# Patient Record
Sex: FEMALE | Race: BLACK OR AFRICAN AMERICAN | NOT HISPANIC OR LATINO
[De-identification: names, ages, dates, MRNs, and addresses within clinical notes are randomized per-mention and may not be internally consistent; named-entity substitution may affect disease eponyms.]

---

## 2017-01-19 ENCOUNTER — NON-APPOINTMENT (OUTPATIENT)
Age: 82
End: 2017-01-19

## 2017-01-19 ENCOUNTER — APPOINTMENT (OUTPATIENT)
Dept: CARDIOLOGY | Facility: CLINIC | Age: 82
End: 2017-01-19

## 2017-01-19 VITALS
OXYGEN SATURATION: 96 % | DIASTOLIC BLOOD PRESSURE: 68 MMHG | WEIGHT: 138 LBS | BODY MASS INDEX: 21.66 KG/M2 | HEIGHT: 67 IN | HEART RATE: 83 BPM | SYSTOLIC BLOOD PRESSURE: 117 MMHG

## 2017-04-19 ENCOUNTER — APPOINTMENT (OUTPATIENT)
Dept: UROGYNECOLOGY | Facility: CLINIC | Age: 82
End: 2017-04-19

## 2017-04-19 DIAGNOSIS — Z83.49 FAMILY HISTORY OF OTHER ENDOCRINE, NUTRITIONAL AND METABOLIC DISEASES: ICD-10-CM

## 2017-04-19 DIAGNOSIS — Z83.1 FAMILY HISTORY OF OTHER INFECTIOUS AND PARASITIC DISEASES: ICD-10-CM

## 2017-04-19 DIAGNOSIS — N39.41 URGE INCONTINENCE: ICD-10-CM

## 2017-04-19 LAB
BILIRUB UR QL STRIP: NORMAL
CLARITY UR: CLEAR
COLLECTION METHOD: NORMAL
GLUCOSE UR-MCNC: 500
HCG UR QL: 0.2 EU/DL
HGB UR QL STRIP.AUTO: NORMAL
KETONES UR-MCNC: NORMAL
LEUKOCYTE ESTERASE UR QL STRIP: NORMAL
NITRITE UR QL STRIP: NORMAL
PH UR STRIP: 5
PROT UR STRIP-MCNC: NORMAL
SP GR UR STRIP: 1.01

## 2017-05-09 ENCOUNTER — APPOINTMENT (OUTPATIENT)
Dept: UROGYNECOLOGY | Facility: CLINIC | Age: 82
End: 2017-05-09

## 2017-05-09 ENCOUNTER — OUTPATIENT (OUTPATIENT)
Dept: OUTPATIENT SERVICES | Facility: HOSPITAL | Age: 82
LOS: 1 days | End: 2017-05-09
Payer: COMMERCIAL

## 2017-05-09 DIAGNOSIS — Z01.818 ENCOUNTER FOR OTHER PREPROCEDURAL EXAMINATION: ICD-10-CM

## 2017-05-09 PROCEDURE — 51784 ANAL/URINARY MUSCLE STUDY: CPT

## 2017-05-09 PROCEDURE — 51797 INTRAABDOMINAL PRESSURE TEST: CPT

## 2017-05-09 PROCEDURE — 51729 CYSTOMETROGRAM W/VP&UP: CPT

## 2017-05-10 DIAGNOSIS — N39.0 URINARY TRACT INFECTION, SITE NOT SPECIFIED: ICD-10-CM

## 2017-05-10 DIAGNOSIS — R39.198 OTHER DIFFICULTIES WITH MICTURITION: ICD-10-CM

## 2017-05-10 DIAGNOSIS — N39.3 STRESS INCONTINENCE (FEMALE) (MALE): ICD-10-CM

## 2017-05-11 ENCOUNTER — NON-APPOINTMENT (OUTPATIENT)
Age: 82
End: 2017-05-11

## 2017-05-11 ENCOUNTER — APPOINTMENT (OUTPATIENT)
Dept: CARDIOLOGY | Facility: CLINIC | Age: 82
End: 2017-05-11

## 2017-05-11 VITALS
WEIGHT: 138 LBS | SYSTOLIC BLOOD PRESSURE: 147 MMHG | HEIGHT: 67 IN | OXYGEN SATURATION: 97 % | HEART RATE: 83 BPM | DIASTOLIC BLOOD PRESSURE: 66 MMHG | BODY MASS INDEX: 21.66 KG/M2

## 2017-05-16 ENCOUNTER — APPOINTMENT (OUTPATIENT)
Dept: UROGYNECOLOGY | Facility: CLINIC | Age: 82
End: 2017-05-16

## 2017-05-16 DIAGNOSIS — N39.3 STRESS INCONTINENCE (FEMALE) (MALE): ICD-10-CM

## 2017-06-15 ENCOUNTER — MEDICATION RENEWAL (OUTPATIENT)
Age: 82
End: 2017-06-15

## 2017-08-23 ENCOUNTER — RESULT REVIEW (OUTPATIENT)
Age: 82
End: 2017-08-23

## 2017-08-24 ENCOUNTER — APPOINTMENT (OUTPATIENT)
Dept: OBGYN | Facility: CLINIC | Age: 82
End: 2017-08-24
Payer: MEDICARE

## 2017-08-24 PROCEDURE — 99203 OFFICE O/P NEW LOW 30 MIN: CPT

## 2017-08-29 ENCOUNTER — APPOINTMENT (OUTPATIENT)
Dept: UROGYNECOLOGY | Facility: CLINIC | Age: 82
End: 2017-08-29
Payer: MEDICARE

## 2017-08-29 VITALS
SYSTOLIC BLOOD PRESSURE: 130 MMHG | WEIGHT: 143 LBS | DIASTOLIC BLOOD PRESSURE: 76 MMHG | HEIGHT: 65 IN | BODY MASS INDEX: 23.82 KG/M2

## 2017-08-29 PROCEDURE — 99213 OFFICE O/P EST LOW 20 MIN: CPT

## 2017-09-07 ENCOUNTER — APPOINTMENT (OUTPATIENT)
Dept: CARDIOLOGY | Facility: CLINIC | Age: 82
End: 2017-09-07
Payer: MEDICARE

## 2017-09-07 ENCOUNTER — NON-APPOINTMENT (OUTPATIENT)
Age: 82
End: 2017-09-07

## 2017-09-07 VITALS
SYSTOLIC BLOOD PRESSURE: 144 MMHG | BODY MASS INDEX: 22.99 KG/M2 | OXYGEN SATURATION: 96 % | WEIGHT: 138 LBS | HEIGHT: 65 IN | HEART RATE: 72 BPM | DIASTOLIC BLOOD PRESSURE: 72 MMHG

## 2017-09-07 PROCEDURE — 93000 ELECTROCARDIOGRAM COMPLETE: CPT

## 2017-09-07 PROCEDURE — 99214 OFFICE O/P EST MOD 30 MIN: CPT

## 2017-11-02 ENCOUNTER — APPOINTMENT (OUTPATIENT)
Dept: UROGYNECOLOGY | Facility: CLINIC | Age: 82
End: 2017-11-02
Payer: MEDICARE

## 2017-11-02 PROCEDURE — 51701 INSERT BLADDER CATHETER: CPT

## 2017-11-02 PROCEDURE — 99213 OFFICE O/P EST LOW 20 MIN: CPT | Mod: 25

## 2017-11-02 PROCEDURE — 81003 URINALYSIS AUTO W/O SCOPE: CPT | Mod: QW

## 2017-11-06 ENCOUNTER — RESULT REVIEW (OUTPATIENT)
Age: 82
End: 2017-11-06

## 2017-11-06 LAB — BACTERIA UR CULT: NORMAL

## 2017-11-27 ENCOUNTER — RESULT REVIEW (OUTPATIENT)
Age: 82
End: 2017-11-27

## 2017-11-27 LAB
APPEARANCE: CLEAR
BACTERIA: NEGATIVE
BILIRUBIN URINE: NEGATIVE
BLOOD URINE: NEGATIVE
COLOR: YELLOW
GLUCOSE QUALITATIVE U: 500 MG/DL
KETONES URINE: NEGATIVE
LEUKOCYTE ESTERASE URINE: NEGATIVE
MICROSCOPIC-UA: NORMAL
NITRITE URINE: NEGATIVE
PH URINE: 5.5
PROTEIN URINE: ABNORMAL MG/DL
RED BLOOD CELLS URINE: 3 /HPF
SPECIFIC GRAVITY URINE: 1.02
SQUAMOUS EPITHELIAL CELLS: 0 /HPF
UROBILINOGEN URINE: NEGATIVE MG/DL
WHITE BLOOD CELLS URINE: 1 /HPF

## 2017-12-07 ENCOUNTER — RESULT REVIEW (OUTPATIENT)
Age: 82
End: 2017-12-07

## 2017-12-07 ENCOUNTER — APPOINTMENT (OUTPATIENT)
Dept: UROGYNECOLOGY | Facility: CLINIC | Age: 82
End: 2017-12-07
Payer: MEDICARE

## 2017-12-07 LAB
BILIRUB UR QL STRIP: NORMAL
BILIRUB UR QL STRIP: NORMAL
CLARITY UR: CLEAR
CLARITY UR: CLEAR
COLLECTION METHOD: NORMAL
COLLECTION METHOD: NORMAL
GLUCOSE UR-MCNC: 500
GLUCOSE UR-MCNC: 500
HCG UR QL: 0.2 EU/DL
HCG UR QL: 0.2 EU/DL
HGB UR QL STRIP.AUTO: NORMAL
HGB UR QL STRIP.AUTO: NORMAL
KETONES UR-MCNC: NORMAL
KETONES UR-MCNC: NORMAL
LEUKOCYTE ESTERASE UR QL STRIP: NORMAL
LEUKOCYTE ESTERASE UR QL STRIP: NORMAL
NITRITE UR QL STRIP: NORMAL
NITRITE UR QL STRIP: NORMAL
PH UR STRIP: 5.5
PH UR STRIP: 5.5
PROT UR STRIP-MCNC: 30
PROT UR STRIP-MCNC: 30
SP GR UR STRIP: 1.01
SP GR UR STRIP: 1.01

## 2017-12-07 PROCEDURE — 81003 URINALYSIS AUTO W/O SCOPE: CPT | Mod: QW

## 2017-12-07 PROCEDURE — 99213 OFFICE O/P EST LOW 20 MIN: CPT | Mod: 25

## 2017-12-21 ENCOUNTER — APPOINTMENT (OUTPATIENT)
Dept: CT IMAGING | Facility: CLINIC | Age: 82
End: 2017-12-21
Payer: MEDICARE

## 2017-12-21 ENCOUNTER — OUTPATIENT (OUTPATIENT)
Dept: OUTPATIENT SERVICES | Facility: HOSPITAL | Age: 82
LOS: 1 days | End: 2017-12-21
Payer: MEDICARE

## 2017-12-21 DIAGNOSIS — D44.7 NEOPLASM OF UNCERTAIN BEHAVIOR OF AORTIC BODY AND OTHER PARAGANGLIA: ICD-10-CM

## 2017-12-21 PROCEDURE — 70480 CT ORBIT/EAR/FOSSA W/O DYE: CPT | Mod: 26

## 2017-12-21 PROCEDURE — 70480 CT ORBIT/EAR/FOSSA W/O DYE: CPT

## 2017-12-28 ENCOUNTER — RX RENEWAL (OUTPATIENT)
Age: 82
End: 2017-12-28

## 2018-01-08 ENCOUNTER — MESSAGE (OUTPATIENT)
Age: 83
End: 2018-01-08

## 2018-02-01 ENCOUNTER — APPOINTMENT (OUTPATIENT)
Dept: UROGYNECOLOGY | Facility: CLINIC | Age: 83
End: 2018-02-01

## 2018-03-15 ENCOUNTER — RX RENEWAL (OUTPATIENT)
Age: 83
End: 2018-03-15

## 2018-04-03 ENCOUNTER — NON-APPOINTMENT (OUTPATIENT)
Age: 83
End: 2018-04-03

## 2018-04-03 ENCOUNTER — APPOINTMENT (OUTPATIENT)
Dept: CARDIOLOGY | Facility: CLINIC | Age: 83
End: 2018-04-03
Payer: MEDICARE

## 2018-04-03 VITALS
OXYGEN SATURATION: 95 % | DIASTOLIC BLOOD PRESSURE: 71 MMHG | HEART RATE: 75 BPM | BODY MASS INDEX: 22.33 KG/M2 | HEIGHT: 65 IN | SYSTOLIC BLOOD PRESSURE: 135 MMHG | WEIGHT: 134 LBS

## 2018-04-03 PROCEDURE — 99214 OFFICE O/P EST MOD 30 MIN: CPT

## 2018-04-03 PROCEDURE — 93000 ELECTROCARDIOGRAM COMPLETE: CPT

## 2018-04-19 ENCOUNTER — OTHER (OUTPATIENT)
Age: 83
End: 2018-04-19

## 2018-05-25 ENCOUNTER — RX RENEWAL (OUTPATIENT)
Age: 83
End: 2018-05-25

## 2018-06-28 ENCOUNTER — APPOINTMENT (OUTPATIENT)
Dept: UROGYNECOLOGY | Facility: CLINIC | Age: 83
End: 2018-06-28
Payer: MEDICARE

## 2018-06-28 PROCEDURE — 99214 OFFICE O/P EST MOD 30 MIN: CPT

## 2018-06-28 RX ORDER — CLOTRIMAZOLE 10 MG/G
1 CREAM TOPICAL
Qty: 90 | Refills: 0 | Status: DISCONTINUED | COMMUNITY
Start: 2018-02-12

## 2018-06-28 RX ORDER — METFORMIN ER 500 MG 500 MG/1
500 TABLET ORAL
Qty: 180 | Refills: 0 | Status: DISCONTINUED | COMMUNITY
Start: 2018-04-23

## 2018-06-28 RX ORDER — REPAGLINIDE 0.5 MG/1
0.5 TABLET ORAL
Qty: 270 | Refills: 0 | Status: DISCONTINUED | COMMUNITY
Start: 2018-05-12

## 2018-07-05 ENCOUNTER — OUTPATIENT (OUTPATIENT)
Dept: OUTPATIENT SERVICES | Facility: HOSPITAL | Age: 83
LOS: 1 days | End: 2018-07-05
Payer: MEDICARE

## 2018-07-05 ENCOUNTER — APPOINTMENT (OUTPATIENT)
Dept: CT IMAGING | Facility: CLINIC | Age: 83
End: 2018-07-05
Payer: MEDICARE

## 2018-07-05 DIAGNOSIS — Z00.8 ENCOUNTER FOR OTHER GENERAL EXAMINATION: ICD-10-CM

## 2018-07-05 PROCEDURE — 70480 CT ORBIT/EAR/FOSSA W/O DYE: CPT

## 2018-07-05 PROCEDURE — 70480 CT ORBIT/EAR/FOSSA W/O DYE: CPT | Mod: 26

## 2018-07-10 ENCOUNTER — APPOINTMENT (OUTPATIENT)
Dept: CARDIOLOGY | Facility: CLINIC | Age: 83
End: 2018-07-10
Payer: MEDICARE

## 2018-07-10 ENCOUNTER — NON-APPOINTMENT (OUTPATIENT)
Age: 83
End: 2018-07-10

## 2018-07-10 VITALS
DIASTOLIC BLOOD PRESSURE: 70 MMHG | SYSTOLIC BLOOD PRESSURE: 120 MMHG | BODY MASS INDEX: 22.16 KG/M2 | OXYGEN SATURATION: 96 % | HEART RATE: 70 BPM | HEIGHT: 65 IN | WEIGHT: 133 LBS

## 2018-07-10 PROCEDURE — 99214 OFFICE O/P EST MOD 30 MIN: CPT

## 2018-07-10 PROCEDURE — 93000 ELECTROCARDIOGRAM COMPLETE: CPT

## 2018-07-11 ENCOUNTER — RESULT REVIEW (OUTPATIENT)
Age: 83
End: 2018-07-11

## 2018-07-11 LAB
ALBUMIN SERPL ELPH-MCNC: 4.3 G/DL
ALP BLD-CCNC: 59 U/L
ALT SERPL-CCNC: 12 U/L
ANION GAP SERPL CALC-SCNC: 14 MMOL/L
AST SERPL-CCNC: 20 U/L
BASOPHILS # BLD AUTO: 0.03 K/UL
BASOPHILS NFR BLD AUTO: 0.4 %
BILIRUB SERPL-MCNC: <0.2 MG/DL
BUN SERPL-MCNC: 18 MG/DL
CALCIUM SERPL-MCNC: 9.9 MG/DL
CHLORIDE SERPL-SCNC: 100 MMOL/L
CHOLEST SERPL-MCNC: 167 MG/DL
CHOLEST/HDLC SERPL: 2.2 RATIO
CO2 SERPL-SCNC: 27 MMOL/L
CREAT SERPL-MCNC: 1.1 MG/DL
EOSINOPHIL # BLD AUTO: 0.25 K/UL
EOSINOPHIL NFR BLD AUTO: 3.1 %
ESTIMATED AVERAGE GLUCOSE: 148 MG/DL
GLUCOSE SERPL-MCNC: 80 MG/DL
HBA1C MFR BLD HPLC: 6.8 %
HCT VFR BLD CALC: 34.3 %
HDLC SERPL-MCNC: 76 MG/DL
HGB BLD-MCNC: 10.9 G/DL
IMM GRANULOCYTES NFR BLD AUTO: 0.2 %
LDLC SERPL CALC-MCNC: 61 MG/DL
LYMPHOCYTES # BLD AUTO: 2.26 K/UL
LYMPHOCYTES NFR BLD AUTO: 28.1 %
MAN DIFF?: NORMAL
MCHC RBC-ENTMCNC: 29.5 PG
MCHC RBC-ENTMCNC: 31.8 GM/DL
MCV RBC AUTO: 93 FL
MONOCYTES # BLD AUTO: 0.54 K/UL
MONOCYTES NFR BLD AUTO: 6.7 %
NEUTROPHILS # BLD AUTO: 4.94 K/UL
NEUTROPHILS NFR BLD AUTO: 61.5 %
PLATELET # BLD AUTO: 281 K/UL
POTASSIUM SERPL-SCNC: 4.8 MMOL/L
PROT SERPL-MCNC: 7 G/DL
RBC # BLD: 3.69 M/UL
RBC # FLD: 14.1 %
SODIUM SERPL-SCNC: 141 MMOL/L
T4 SERPL-MCNC: 7.7 UG/DL
TRIGL SERPL-MCNC: 151 MG/DL
TSH SERPL-ACNC: 1.04 UIU/ML
WBC # FLD AUTO: 8.04 K/UL

## 2018-09-04 ENCOUNTER — RESULT REVIEW (OUTPATIENT)
Age: 83
End: 2018-09-04

## 2018-09-05 ENCOUNTER — APPOINTMENT (OUTPATIENT)
Dept: OBGYN | Facility: CLINIC | Age: 83
End: 2018-09-05
Payer: MEDICARE

## 2018-09-05 PROCEDURE — 99213 OFFICE O/P EST LOW 20 MIN: CPT

## 2018-10-02 ENCOUNTER — INPATIENT (INPATIENT)
Facility: HOSPITAL | Age: 83
LOS: 2 days | Discharge: HOME | DRG: 310 | End: 2018-10-05
Attending: INTERNAL MEDICINE | Admitting: INTERNAL MEDICINE
Payer: MEDICARE

## 2018-10-02 VITALS
SYSTOLIC BLOOD PRESSURE: 98 MMHG | WEIGHT: 138.89 LBS | RESPIRATION RATE: 17 BRPM | OXYGEN SATURATION: 96 % | HEART RATE: 66 BPM | HEIGHT: 67 IN | TEMPERATURE: 98 F | DIASTOLIC BLOOD PRESSURE: 63 MMHG

## 2018-10-02 DIAGNOSIS — R07.9 CHEST PAIN, UNSPECIFIED: ICD-10-CM

## 2018-10-02 LAB
ALBUMIN SERPL ELPH-MCNC: 4.3 G/DL — SIGNIFICANT CHANGE UP (ref 3.3–5)
ALP SERPL-CCNC: 52 U/L — SIGNIFICANT CHANGE UP (ref 40–120)
ALT FLD-CCNC: 11 U/L — SIGNIFICANT CHANGE UP (ref 10–45)
ANION GAP SERPL CALC-SCNC: 13 MMOL/L — SIGNIFICANT CHANGE UP (ref 5–17)
APTT BLD: 29.1 SEC — SIGNIFICANT CHANGE UP (ref 27.5–37.4)
AST SERPL-CCNC: 25 U/L — SIGNIFICANT CHANGE UP (ref 10–40)
BASOPHILS # BLD AUTO: 0.1 K/UL — SIGNIFICANT CHANGE UP (ref 0–0.2)
BASOPHILS NFR BLD AUTO: 0.9 % — SIGNIFICANT CHANGE UP (ref 0–2)
BILIRUB SERPL-MCNC: 0.2 MG/DL — SIGNIFICANT CHANGE UP (ref 0.2–1.2)
BUN SERPL-MCNC: 18 MG/DL — SIGNIFICANT CHANGE UP (ref 7–23)
CALCIUM SERPL-MCNC: 9.8 MG/DL — SIGNIFICANT CHANGE UP (ref 8.4–10.5)
CHLORIDE SERPL-SCNC: 104 MMOL/L — SIGNIFICANT CHANGE UP (ref 96–108)
CO2 SERPL-SCNC: 25 MMOL/L — SIGNIFICANT CHANGE UP (ref 22–31)
CREAT SERPL-MCNC: 1.14 MG/DL — SIGNIFICANT CHANGE UP (ref 0.5–1.3)
EOSINOPHIL # BLD AUTO: 0.1 K/UL — SIGNIFICANT CHANGE UP (ref 0–0.5)
EOSINOPHIL NFR BLD AUTO: 1.4 % — SIGNIFICANT CHANGE UP (ref 0–6)
GLUCOSE BLDC GLUCOMTR-MCNC: 93 MG/DL — SIGNIFICANT CHANGE UP (ref 70–99)
GLUCOSE SERPL-MCNC: 198 MG/DL — HIGH (ref 70–99)
HCT VFR BLD CALC: 35.7 % — SIGNIFICANT CHANGE UP (ref 34.5–45)
HGB BLD-MCNC: 12.1 G/DL — SIGNIFICANT CHANGE UP (ref 11.5–15.5)
INR BLD: 1.03 RATIO — SIGNIFICANT CHANGE UP (ref 0.88–1.16)
LYMPHOCYTES # BLD AUTO: 1.6 K/UL — SIGNIFICANT CHANGE UP (ref 1–3.3)
LYMPHOCYTES # BLD AUTO: 21.4 % — SIGNIFICANT CHANGE UP (ref 13–44)
MCHC RBC-ENTMCNC: 30.4 PG — SIGNIFICANT CHANGE UP (ref 27–34)
MCHC RBC-ENTMCNC: 33.8 GM/DL — SIGNIFICANT CHANGE UP (ref 32–36)
MCV RBC AUTO: 90.1 FL — SIGNIFICANT CHANGE UP (ref 80–100)
MONOCYTES # BLD AUTO: 0.6 K/UL — SIGNIFICANT CHANGE UP (ref 0–0.9)
MONOCYTES NFR BLD AUTO: 8 % — SIGNIFICANT CHANGE UP (ref 2–14)
NEUTROPHILS # BLD AUTO: 4.9 K/UL — SIGNIFICANT CHANGE UP (ref 1.8–7.4)
NEUTROPHILS NFR BLD AUTO: 68.3 % — SIGNIFICANT CHANGE UP (ref 43–77)
PLATELET # BLD AUTO: 296 K/UL — SIGNIFICANT CHANGE UP (ref 150–400)
POTASSIUM SERPL-MCNC: 4.6 MMOL/L — SIGNIFICANT CHANGE UP (ref 3.5–5.3)
POTASSIUM SERPL-SCNC: 4.6 MMOL/L — SIGNIFICANT CHANGE UP (ref 3.5–5.3)
PROT SERPL-MCNC: 7.3 G/DL — SIGNIFICANT CHANGE UP (ref 6–8.3)
PROTHROM AB SERPL-ACNC: 11.2 SEC — SIGNIFICANT CHANGE UP (ref 9.8–12.7)
RBC # BLD: 3.96 M/UL — SIGNIFICANT CHANGE UP (ref 3.8–5.2)
RBC # FLD: 12.4 % — SIGNIFICANT CHANGE UP (ref 10.3–14.5)
SODIUM SERPL-SCNC: 142 MMOL/L — SIGNIFICANT CHANGE UP (ref 135–145)
TROPONIN T, HIGH SENSITIVITY RESULT: 17 NG/L — SIGNIFICANT CHANGE UP (ref 0–51)
TROPONIN T, HIGH SENSITIVITY RESULT: 18 NG/L — SIGNIFICANT CHANGE UP (ref 0–51)
WBC # BLD: 7.2 K/UL — SIGNIFICANT CHANGE UP (ref 3.8–10.5)
WBC # FLD AUTO: 7.2 K/UL — SIGNIFICANT CHANGE UP (ref 3.8–10.5)

## 2018-10-02 PROCEDURE — 71046 X-RAY EXAM CHEST 2 VIEWS: CPT | Mod: 26

## 2018-10-02 PROCEDURE — 99223 1ST HOSP IP/OBS HIGH 75: CPT

## 2018-10-02 PROCEDURE — 99285 EMERGENCY DEPT VISIT HI MDM: CPT | Mod: 25

## 2018-10-02 PROCEDURE — 93010 ELECTROCARDIOGRAM REPORT: CPT

## 2018-10-02 RX ORDER — GLUCAGON INJECTION, SOLUTION 0.5 MG/.1ML
1 INJECTION, SOLUTION SUBCUTANEOUS ONCE
Qty: 0 | Refills: 0 | Status: DISCONTINUED | OUTPATIENT
Start: 2018-10-02 | End: 2018-10-05

## 2018-10-02 RX ORDER — ASPIRIN/CALCIUM CARB/MAGNESIUM 324 MG
324 TABLET ORAL ONCE
Qty: 0 | Refills: 0 | Status: COMPLETED | OUTPATIENT
Start: 2018-10-02 | End: 2018-10-02

## 2018-10-02 RX ORDER — DEXTROSE 50 % IN WATER 50 %
15 SYRINGE (ML) INTRAVENOUS ONCE
Qty: 0 | Refills: 0 | Status: DISCONTINUED | OUTPATIENT
Start: 2018-10-02 | End: 2018-10-05

## 2018-10-02 RX ORDER — METOPROLOL TARTRATE 50 MG
25 TABLET ORAL DAILY
Qty: 0 | Refills: 0 | Status: DISCONTINUED | OUTPATIENT
Start: 2018-10-02 | End: 2018-10-03

## 2018-10-02 RX ORDER — FAMOTIDINE 10 MG/ML
20 INJECTION INTRAVENOUS DAILY
Qty: 0 | Refills: 0 | Status: DISCONTINUED | OUTPATIENT
Start: 2018-10-02 | End: 2018-10-05

## 2018-10-02 RX ORDER — HEPARIN SODIUM 5000 [USP'U]/ML
5000 INJECTION INTRAVENOUS; SUBCUTANEOUS EVERY 8 HOURS
Qty: 0 | Refills: 0 | Status: DISCONTINUED | OUTPATIENT
Start: 2018-10-02 | End: 2018-10-05

## 2018-10-02 RX ORDER — REPAGLINIDE 1 MG/1
1 TABLET ORAL
Qty: 0 | Refills: 0 | COMMUNITY

## 2018-10-02 RX ORDER — INFLUENZA VIRUS VACCINE 15; 15; 15; 15 UG/.5ML; UG/.5ML; UG/.5ML; UG/.5ML
0.5 SUSPENSION INTRAMUSCULAR ONCE
Qty: 0 | Refills: 0 | Status: COMPLETED | OUTPATIENT
Start: 2018-10-02 | End: 2018-10-05

## 2018-10-02 RX ORDER — DONEPEZIL HYDROCHLORIDE 10 MG/1
1 TABLET, FILM COATED ORAL
Qty: 0 | Refills: 0 | COMMUNITY

## 2018-10-02 RX ORDER — METFORMIN HYDROCHLORIDE 850 MG/1
1000 TABLET ORAL
Qty: 0 | Refills: 0 | Status: DISCONTINUED | OUTPATIENT
Start: 2018-10-02 | End: 2018-10-03

## 2018-10-02 RX ORDER — SODIUM CHLORIDE 9 MG/ML
1000 INJECTION, SOLUTION INTRAVENOUS
Qty: 0 | Refills: 0 | Status: DISCONTINUED | OUTPATIENT
Start: 2018-10-02 | End: 2018-10-05

## 2018-10-02 RX ORDER — METFORMIN HYDROCHLORIDE 850 MG/1
1 TABLET ORAL
Qty: 0 | Refills: 0 | COMMUNITY

## 2018-10-02 RX ORDER — ROSUVASTATIN CALCIUM 5 MG/1
20 TABLET ORAL AT BEDTIME
Qty: 0 | Refills: 0 | Status: DISCONTINUED | OUTPATIENT
Start: 2018-10-02 | End: 2018-10-05

## 2018-10-02 RX ORDER — LOSARTAN POTASSIUM 100 MG/1
50 TABLET, FILM COATED ORAL DAILY
Qty: 0 | Refills: 0 | Status: DISCONTINUED | OUTPATIENT
Start: 2018-10-02 | End: 2018-10-05

## 2018-10-02 RX ORDER — ASPIRIN/CALCIUM CARB/MAGNESIUM 324 MG
81 TABLET ORAL DAILY
Qty: 0 | Refills: 0 | Status: DISCONTINUED | OUTPATIENT
Start: 2018-10-02 | End: 2018-10-05

## 2018-10-02 RX ORDER — DEXTROSE 50 % IN WATER 50 %
12.5 SYRINGE (ML) INTRAVENOUS ONCE
Qty: 0 | Refills: 0 | Status: DISCONTINUED | OUTPATIENT
Start: 2018-10-02 | End: 2018-10-05

## 2018-10-02 RX ORDER — DEXTROSE 50 % IN WATER 50 %
25 SYRINGE (ML) INTRAVENOUS ONCE
Qty: 0 | Refills: 0 | Status: DISCONTINUED | OUTPATIENT
Start: 2018-10-02 | End: 2018-10-05

## 2018-10-02 RX ORDER — DONEPEZIL HYDROCHLORIDE 10 MG/1
5 TABLET, FILM COATED ORAL AT BEDTIME
Qty: 0 | Refills: 0 | Status: DISCONTINUED | OUTPATIENT
Start: 2018-10-02 | End: 2018-10-05

## 2018-10-02 RX ADMIN — Medication 324 MILLIGRAM(S): at 13:54

## 2018-10-02 NOTE — H&P ADULT - NSHPPHYSICALEXAM_GEN_ALL_CORE
Physical exam with an elderly, chronically ill appearing F.  Afebrile.  HR  60  RR 14.  BP  132/76  98% on RA.  HEENT< PERRL< EOMI, neck supple, chest clear, cor s1 s2, refused breast exam.  Abdomen soft normal bowel sounds nontender, no rebound.  Ext with no edema (refused to remove socks).   Neurologic exam AXOx3.  Speech fluent.  Cognition intact.  Memory intact.  UE/LE 5/5.

## 2018-10-02 NOTE — H&P ADULT - PROBLEM SELECTOR PLAN 3
Presumed GERD, but concerning history of weight loss and on iron supplements>>would contact patient's GI in the AM on recent outpatient GI workup (i.e. EGD).

## 2018-10-02 NOTE — H&P ADULT - PROBLEM SELECTOR PLAN 2
See above.   Patient initially adamant against S/S but now agrees to FS and sliding scale>>will continue with metformin but would review with patient's endocrinologist in the AM patient's repaglinide.

## 2018-10-02 NOTE — H&P ADULT - HISTORY OF PRESENT ILLNESS
NIGHT HOSPITALIST:   Patient UNKNOWN to me previously, assigned to me at this point via the ER and by Dr. Appiah to admit this 82 y/o F--patient followed by her office physicians above--patient with a history of reported GERD and IBS, type 2 DM on metformin and repaglinide (followed by Dr. Eladio Velazquez), hyperlipidemia last admission to [place] in 2012, with the patient self referring to [facility] following an episode of epigastric pain with subjective dyspnea following an episode with a stressful event with a family meeting (patient refused to elaborate).  Patient with a similar episode one month ago.  Patient denies abdominal pain at present.  No dyspnea on exertion.  No chest pain/pressure.   No cough.  No fever, no chills, no rigors.   Patient with intermittently on oral iron (followed by GI Dr. Walsh above) with reported GERD, IBS but admits to a 4 kg weight loss for months that patient attributes to voluntary intake.  No dysuria, no hematuria.   Remaining review of systems not contributory.

## 2018-10-02 NOTE — ED PROVIDER NOTE - PMH
Diabetes mellitus type 2, controlled    HLD (hyperlipidemia)    HTN - Hypertension    IBS (irritable bowel syndrome)

## 2018-10-02 NOTE — H&P ADULT - ASSESSMENT
NIGHT HOSPITALIST:   Self referral of patient for symptoms of epigastric pain with subjective dyspnea this AM with a similar episode one month ago in this patient with type 2 DM with fluctuating random plasma glucose with reported GERD, IBS but with involuntary weight loss on oral iron supplementation (presumably for iron deficiency anemia)>>admitted to telemetry with nondiagnostic initial HS troponin.   Will still  assume cardiac equivalent and will obtain echo--would consider contacting Dr. Stephenson of patient's admission.    Patient is quite adamant and was upset about the possibility of routine FS with sliding scale coverage in the hospital>>patient does not check her own FS at home and unclear if symptoms are related to labile FS.  Patient apparently spoke with her RN and now agrees to the S/S regimen.  Will cautiously continue with metformin for now but would contact Dr. Eladio Velazquez on patient's admission and would review patient's repaglinide in the AM.  Will not provide sliding scale but will check FS to monitor for hypoglycemia.    Unclear if the patient's report of 4 kg weight loss is due to patient's glycemic control or whether this is from a GI source>>would contact patient's GI, Dr. Walsh, on a recent outpatient EGD workup.    Patient did NOT wish to discuss the circumstances with a family meeting that prompted her symptoms.  Emotional support provided to patient.

## 2018-10-02 NOTE — H&P ADULT - NSHPOUTPATIENTPROVIDERS_GEN_ALL_CORE
Yobani Stephenson MD  [phone]  Eladio Velazquez MD  (endocrinology)  [phone]  Jarod Walsh MD (GI) [phone]

## 2018-10-02 NOTE — H&P ADULT - NSHPSOURCEINFOTX_GEN_ALL_CORE
Reviewed medication list provided by patient. Reviewed medication list provided by patient.  Patient refused to have family contacted at present.

## 2018-10-02 NOTE — H&P ADULT - NSHPLABSRESULTS_GEN_ALL_CORE
WBC 7.2.  Hgb 12.1.  Platelets of 296K.  INR 1.0.  K+ 4.6.  Random glucose of 198>>repeat 93 without intervention.  Cr 1.1.  Alb 3.2.  chest radiograph reviewed with no infiltrate or effusion.  HS troponin nondiagnostic x 2.  EKG tracing reviewed with NSR at 77 with poor anterior R wave progression.

## 2018-10-02 NOTE — H&P ADULT - PROBLEM SELECTOR PLAN 1
See above.   Echo.   Would contact Dr. RUY Stephenson and Dr. Walsh (GI) of patient's admission.  Pepcid 20 mg daily for now.

## 2018-10-02 NOTE — ED PROVIDER NOTE - OBJECTIVE STATEMENT
83 F w HTN, DM, HLD, IBS reports experiencing dyspnea which began after a stressful event last night. She does not know how to describe the sensation in the chest, she reports that it is a feeling that makes her feel that she needs to lean forward and breath shallow which is intermittent since last night. She denies that the sensation is painful. She denies a cough, but is coughing during interview.  PCP/Cardiologist Dr Yobani Stephenson [phone]

## 2018-10-02 NOTE — ED ADULT NURSE NOTE - NSIMPLEMENTINTERV_GEN_ALL_ED
Implemented All Universal Safety Interventions:  [place] to call system. Call bell, personal items and telephone within reach. Instruct patient to call for assistance. Room bathroom lighting operational. Non-slip footwear when patient is off stretcher. Physically safe environment: no spills, clutter or unnecessary equipment. Stretcher in lowest position, wheels locked, appropriate side rails in place.

## 2018-10-02 NOTE — ED ADULT NURSE NOTE - OBJECTIVE STATEMENT
84 y/o with PMH HTN HDL DM presenting to ED for intermittent dull epigastric chest pain & SOB x 1 day. Pt states "I had a verbal altercation with my grandchildren last night and was upset. When I sat down I noticed that I was feeling short of breath and that there was this dull pain in my chest that feels like indigestion. I felt dizzy while it happened but then it went away." Upon exam, pt A&Ox3 gross neuro intact, lungs cta bilaterally, no difficulty speaking in complete sentences, s1s2 heart sounds heard, EKG done & given to MD, placed on cardiac monitor NSR 80s. Pt denies chest pain, sob, at this time, ha, n/v/d, abdominal pain, f/c, urinary symptoms, hematuria.

## 2018-10-02 NOTE — H&P ADULT - ATTENDING COMMENTS
NIGHT HOSPITALIST:  Patient aware of course and agrees with plan/care as above.  Care reviewed with covering NP.   Care assumed by Dr. Appiah.    Fracisco Gardner MD  [phone]

## 2018-10-02 NOTE — ED PROVIDER NOTE - MEDICAL DECISION MAKING DETAILS
Nemes - 84yo F w HTN, DM, HLD, IBS p/w chest pain and dyspnea after a stressful event last night. Repeated stx today, associated w dizziness. Asymptomatic now. Poor historian. VS, exam wnl, lungs/cardiac clear, no LE edema/tenderness. Low suspicion for dissection/PE, will get cardiac w/u, consider CDU vs admission

## 2018-10-03 DIAGNOSIS — R10.13 EPIGASTRIC PAIN: ICD-10-CM

## 2018-10-03 DIAGNOSIS — E11.65 TYPE 2 DIABETES MELLITUS WITH HYPERGLYCEMIA: ICD-10-CM

## 2018-10-03 DIAGNOSIS — K21.9 GASTRO-ESOPHAGEAL REFLUX DISEASE WITHOUT ESOPHAGITIS: ICD-10-CM

## 2018-10-03 LAB
ANION GAP SERPL CALC-SCNC: 11 MMOL/L — SIGNIFICANT CHANGE UP (ref 5–17)
APPEARANCE UR: ABNORMAL
BACTERIA # UR AUTO: ABNORMAL
BASOPHILS # BLD AUTO: 0.01 K/UL — SIGNIFICANT CHANGE UP (ref 0–0.2)
BASOPHILS NFR BLD AUTO: 0.2 % — SIGNIFICANT CHANGE UP (ref 0–2)
BILIRUB UR-MCNC: NEGATIVE — SIGNIFICANT CHANGE UP
BUN SERPL-MCNC: 18 MG/DL — SIGNIFICANT CHANGE UP (ref 7–23)
CALCIUM SERPL-MCNC: 9.7 MG/DL — SIGNIFICANT CHANGE UP (ref 8.4–10.5)
CHLORIDE SERPL-SCNC: 106 MMOL/L — SIGNIFICANT CHANGE UP (ref 96–108)
CO2 SERPL-SCNC: 27 MMOL/L — SIGNIFICANT CHANGE UP (ref 22–31)
COLOR SPEC: YELLOW — SIGNIFICANT CHANGE UP
CREAT ?TM UR-MCNC: 98 MG/DL — SIGNIFICANT CHANGE UP
CREAT SERPL-MCNC: 1.1 MG/DL — SIGNIFICANT CHANGE UP (ref 0.5–1.3)
DIFF PNL FLD: NEGATIVE — SIGNIFICANT CHANGE UP
EOSINOPHIL # BLD AUTO: 0.22 K/UL — SIGNIFICANT CHANGE UP (ref 0–0.5)
EOSINOPHIL NFR BLD AUTO: 3.4 % — SIGNIFICANT CHANGE UP (ref 0–6)
EPI CELLS # UR: 7 /HPF — HIGH
GLUCOSE BLDC GLUCOMTR-MCNC: 121 MG/DL — HIGH (ref 70–99)
GLUCOSE BLDC GLUCOMTR-MCNC: 123 MG/DL — HIGH (ref 70–99)
GLUCOSE BLDC GLUCOMTR-MCNC: 158 MG/DL — HIGH (ref 70–99)
GLUCOSE BLDC GLUCOMTR-MCNC: 171 MG/DL — HIGH (ref 70–99)
GLUCOSE SERPL-MCNC: 118 MG/DL — HIGH (ref 70–99)
GLUCOSE UR QL: NEGATIVE — SIGNIFICANT CHANGE UP
HBA1C BLD-MCNC: 7.3 % — HIGH (ref 4–5.6)
HCT VFR BLD CALC: 33.8 % — LOW (ref 34.5–45)
HGB BLD-MCNC: 11.2 G/DL — LOW (ref 11.5–15.5)
IMM GRANULOCYTES NFR BLD AUTO: 0 % — SIGNIFICANT CHANGE UP (ref 0–1.5)
KETONES UR-MCNC: NEGATIVE — SIGNIFICANT CHANGE UP
LEUKOCYTE ESTERASE UR-ACNC: NEGATIVE — SIGNIFICANT CHANGE UP
LYMPHOCYTES # BLD AUTO: 2.42 K/UL — SIGNIFICANT CHANGE UP (ref 1–3.3)
LYMPHOCYTES # BLD AUTO: 37.8 % — SIGNIFICANT CHANGE UP (ref 13–44)
MCHC RBC-ENTMCNC: 30.1 PG — SIGNIFICANT CHANGE UP (ref 27–34)
MCHC RBC-ENTMCNC: 33.1 GM/DL — SIGNIFICANT CHANGE UP (ref 32–36)
MCV RBC AUTO: 90.9 FL — SIGNIFICANT CHANGE UP (ref 80–100)
MICROALBUMIN UR-MCNC: 6 MG/DL — SIGNIFICANT CHANGE UP
MICROALBUMIN/CREAT UR-RTO: 61 MG/G — HIGH (ref 0–30)
MONOCYTES # BLD AUTO: 0.54 K/UL — SIGNIFICANT CHANGE UP (ref 0–0.9)
MONOCYTES NFR BLD AUTO: 8.4 % — SIGNIFICANT CHANGE UP (ref 2–14)
NEUTROPHILS # BLD AUTO: 3.22 K/UL — SIGNIFICANT CHANGE UP (ref 1.8–7.4)
NEUTROPHILS NFR BLD AUTO: 50.2 % — SIGNIFICANT CHANGE UP (ref 43–77)
NITRITE UR-MCNC: NEGATIVE — SIGNIFICANT CHANGE UP
PH UR: 6 — SIGNIFICANT CHANGE UP (ref 5–8)
PLATELET # BLD AUTO: 276 K/UL — SIGNIFICANT CHANGE UP (ref 150–400)
POTASSIUM SERPL-MCNC: 4.3 MMOL/L — SIGNIFICANT CHANGE UP (ref 3.5–5.3)
POTASSIUM SERPL-SCNC: 4.3 MMOL/L — SIGNIFICANT CHANGE UP (ref 3.5–5.3)
PROT UR-MCNC: ABNORMAL
RBC # BLD: 3.72 M/UL — LOW (ref 3.8–5.2)
RBC # FLD: 13.9 % — SIGNIFICANT CHANGE UP (ref 10.3–14.5)
RBC CASTS # UR COMP ASSIST: 4 /HPF — SIGNIFICANT CHANGE UP (ref 0–4)
SODIUM SERPL-SCNC: 144 MMOL/L — SIGNIFICANT CHANGE UP (ref 135–145)
SP GR SPEC: 1.02 — SIGNIFICANT CHANGE UP (ref 1.01–1.02)
UROBILINOGEN FLD QL: NEGATIVE — SIGNIFICANT CHANGE UP
WBC # BLD: 6.41 K/UL — SIGNIFICANT CHANGE UP (ref 3.8–10.5)
WBC # FLD AUTO: 6.41 K/UL — SIGNIFICANT CHANGE UP (ref 3.8–10.5)
WBC UR QL: 7 /HPF — HIGH (ref 0–5)

## 2018-10-03 PROCEDURE — 99222 1ST HOSP IP/OBS MODERATE 55: CPT

## 2018-10-03 PROCEDURE — 93306 TTE W/DOPPLER COMPLETE: CPT | Mod: 26

## 2018-10-03 PROCEDURE — 99223 1ST HOSP IP/OBS HIGH 75: CPT

## 2018-10-03 RX ORDER — METOPROLOL TARTRATE 50 MG
50 TABLET ORAL DAILY
Qty: 0 | Refills: 0 | Status: DISCONTINUED | OUTPATIENT
Start: 2018-10-03 | End: 2018-10-04

## 2018-10-03 RX ORDER — AMIODARONE HYDROCHLORIDE 400 MG/1
400 TABLET ORAL
Qty: 0 | Refills: 0 | Status: DISCONTINUED | OUTPATIENT
Start: 2018-10-03 | End: 2018-10-05

## 2018-10-03 RX ORDER — INSULIN LISPRO 100/ML
VIAL (ML) SUBCUTANEOUS AT BEDTIME
Qty: 0 | Refills: 0 | Status: DISCONTINUED | OUTPATIENT
Start: 2018-10-03 | End: 2018-10-05

## 2018-10-03 RX ORDER — INSULIN LISPRO 100/ML
VIAL (ML) SUBCUTANEOUS
Qty: 0 | Refills: 0 | Status: DISCONTINUED | OUTPATIENT
Start: 2018-10-03 | End: 2018-10-05

## 2018-10-03 RX ADMIN — Medication 1: at 12:36

## 2018-10-03 RX ADMIN — ROSUVASTATIN CALCIUM 20 MILLIGRAM(S): 5 TABLET ORAL at 22:35

## 2018-10-03 RX ADMIN — DONEPEZIL HYDROCHLORIDE 5 MILLIGRAM(S): 10 TABLET, FILM COATED ORAL at 22:29

## 2018-10-03 RX ADMIN — HEPARIN SODIUM 5000 UNIT(S): 5000 INJECTION INTRAVENOUS; SUBCUTANEOUS at 05:08

## 2018-10-03 RX ADMIN — Medication 1 TABLET(S): at 12:36

## 2018-10-03 RX ADMIN — AMIODARONE HYDROCHLORIDE 400 MILLIGRAM(S): 400 TABLET ORAL at 18:49

## 2018-10-03 RX ADMIN — Medication 25 MILLIGRAM(S): at 05:08

## 2018-10-03 RX ADMIN — Medication 50 MILLIGRAM(S): at 22:35

## 2018-10-03 RX ADMIN — FAMOTIDINE 20 MILLIGRAM(S): 10 INJECTION INTRAVENOUS at 12:36

## 2018-10-03 RX ADMIN — LOSARTAN POTASSIUM 50 MILLIGRAM(S): 100 TABLET, FILM COATED ORAL at 05:08

## 2018-10-03 RX ADMIN — HEPARIN SODIUM 5000 UNIT(S): 5000 INJECTION INTRAVENOUS; SUBCUTANEOUS at 12:36

## 2018-10-03 RX ADMIN — HEPARIN SODIUM 5000 UNIT(S): 5000 INJECTION INTRAVENOUS; SUBCUTANEOUS at 22:29

## 2018-10-03 RX ADMIN — Medication 81 MILLIGRAM(S): at 12:36

## 2018-10-03 NOTE — CONSULT NOTE ADULT - ASSESSMENT
#SVT:  83F hx HTN, HLD, DMII, GERD, IBS referred to [facility] ED from office for an episode of epigastric pain with subjective dyspnea following an episode with a stressful event with a family meeting (patient refused to elaborate).  Patient with a similar episode one month ago.  Patient denies abdominal pain at present.  No dyspnea on exertion.  No chest pain/pressure.   No cough.  No fever, no chills, no rigors.      #Assessment:  SVT (appears more likely ATach vs AV(N)RT) - difficult to find P-waves on telemetry    #Plan:  -Increase metoprolol as tolerated. #SVT:  83F hx HTN, HLD, DMII, GERD, IBS referred to [facility] ED from office for an episode of epigastric pain with subjective dyspnea following an episode with a stressful event with a family meeting (patient refused to elaborate).  Patient with a similar episode one month ago. Endorsing "indigestion" when tachycardic rhythm is sustained which may be concerning for anginal equivalent. Patient short of breath as well after episodes. Lastly, patient is also symptomatic from just APCs as well.    #Assessment:  High APC burden  Atrial tachycardia.    #Plan:  -Amiodarone 400 mg BID x 7 days, Amiodarone 200 mg BID x 7 days, Amiodarone 200 mg daily  -Check Thyroid and LFT panels

## 2018-10-03 NOTE — CONSULT NOTE ADULT - SUBJECTIVE AND OBJECTIVE BOX
Patient seen and evaluated at bedside    Chief Complaint:    HPI:  83F hx HTN, HLD, DMII, GERD, IBS referred to [facility] ED from office for an episode of epigastric pain with subjective dyspnea following an episode with a stressful event with a family meeting (patient refused to elaborate).  Patient with a similar episode one month ago.  Patient denies abdominal pain at present.  No dyspnea on exertion.  No chest pain/pressure.   No cough.  No fever, no chills, no rigors.      Consult called for runs of SVT into 150s    Patient endorsing that she has had palpitations for many years, usually a quick thump or skipped beats in her chest. Rarely, she does also get episodes where she can feel her heart beat very fast for an extended period of time (seconds). These episodes are associated with indigestion-like discomfort and are followed by shortness of breath at the offset. Also endorsed lightheadedness and dizziness, but denied LOC. Takes metoprolol succinate 25 mg daily. States that Dr. Stephenson came by and ordered them to increase her dose to 50 mg (which has been ordered)    Telemetry review: Patient primarily in sinus rhythm, with frequent PACs, notably has extended periods of a regular narrow complex tachycardia with sudden onset/offset. No EKGs in chart at time of events. Unclear if ATach vs AVNRT/AVRT      PMHx:   HLD (hyperlipidemia)  IBS (irritable bowel syndrome)  Diabetes mellitus type 2, controlled  HTN - Hypertension      PSHx:   H/O: hysterectomy      Allergies:  No Known Allergies      Home Meds:    Current Medications:   aspirin enteric coated 81 milliGRAM(s) Oral daily  dextrose 40% Gel 15 Gram(s) Oral once PRN  dextrose 5%. 1000 milliLiter(s) IV Continuous <Continuous>  dextrose 50% Injectable 12.5 Gram(s) IV Push once  dextrose 50% Injectable 25 Gram(s) IV Push once  dextrose 50% Injectable 25 Gram(s) IV Push once  donepezil 5 milliGRAM(s) Oral at bedtime  famotidine    Tablet 20 milliGRAM(s) Oral daily  glucagon  Injectable 1 milliGRAM(s) IntraMuscular once PRN  heparin  Injectable 5000 Unit(s) SubCutaneous every 8 hours  influenza   Vaccine 0.5 milliLiter(s) IntraMuscular once  insulin lispro (HumaLOG) corrective regimen sliding scale   SubCutaneous three times a day before meals  insulin lispro (HumaLOG) corrective regimen sliding scale   SubCutaneous at bedtime  losartan 50 milliGRAM(s) Oral daily  metoprolol succinate ER 50 milliGRAM(s) Oral daily  multivitamin 1 Tablet(s) Oral daily  rosuvastatin 20 milliGRAM(s) Oral at bedtime      FAMILY HISTORY:  No pertinent family history in first degree relatives      Social History:  Smoking History: Former 1/2ppd, quit  Alcohol Use: Denied  Drug Use: Denied    REVIEW OF SYSTEMS:  CONSTITUTIONAL: No weakness, fevers or chills  EYES/ENT: No visual changes;  No dysphagia  NECK: No pain or stiffness  RESPIRATORY: No cough, wheezing, hemoptysis; No shortness of breath  CARDIOVASCULAR: No chest pain; No lower extremity edema. Endorsed palpitations  GASTROINTESTINAL: No abdominal or epigastric pain. No nausea, vomiting, or hematemesis; No diarrhea or constipation. No melena or hematochezia.  BACK: No back pain  GENITOURINARY: No dysuria, frequency or hematuria  NEUROLOGICAL: No numbness or weakness  SKIN: No itching, burning, rashes, or lesions   All other review of systems is negative unless indicated above.    Physical Exam:  T(F): 98.8 (10-03), Max: 98.8 (10-03)  HR: 76 (10-03) (58 - 76)  BP: 101/59 (10-03) (101/59 - 156/73)  RR: 18 (10-03)  SpO2: 96% (10-03)  GENERAL: No acute distress, well-developed  HEAD:  Atraumatic, Normocephalic  ENT: EOMI, PERRLA, conjunctiva and sclera clear, Neck supple, No JVD, moist mucosa  CHEST/LUNG: Clear to auscultation bilaterally; No wheeze, equal breath sounds bilaterally   BACK: No spinal tenderness  HEART: Regular rate and rhythm; No murmurs, rubs, or gallops  ABDOMEN: Soft, Nontender, Nondistended; Bowel sounds present  EXTREMITIES:  No clubbing, cyanosis, or edema  PSYCH: Nl behavior, nl affect  NEUROLOGY: AAOx3, non-focal, cranial nerves intact  SKIN: Normal color, No rashes or lesions  LINES:    Cardiovascular Diagnostic Testing:    ECG: Personally reviewed:  Sinus rhythm with PACs    Echo: Personally reviewed:  Ordered    Labs: Personally reviewed                        11.2   6.41  )-----------( 276      ( 03 Oct 2018 07:43 )             33.8     10-03    144  |  106  |  18  ----------------------------<  118<H>  4.3   |  27  |  1.10    Ca    9.7      03 Oct 2018 06:31    TPro  7.3  /  Alb  4.3  /  TBili  0.2  /  DBili  x   /  AST  25  /  ALT  11  /  AlkPhos  52  10-02    PT/INR - ( 02 Oct 2018 14:12 )   PT: 11.2 sec;   INR: 1.03 ratio         PTT - ( 02 Oct 2018 14:12 )  PTT:29.1 sec    CARDIAC MARKERS ( 02 Oct 2018 15:30 )  17 ng/L / x     / x     / x     / x     / x      CARDIAC MARKERS ( 02 Oct 2018 14:12 )  18 ng/L / x     / x     / x     / x     / x          Hemoglobin A1C, Whole Blood: 7.3 % (10-03 @ 07:43)

## 2018-10-03 NOTE — CONSULT NOTE ADULT - ASSESSMENT
83 year-old with symptomatic PAT.  Appreciate EP consult - agree with amiodarone for 83 year-old with PAT despite beta-blocker.    Will monitor pulmonary function, thyroid function, and liver function tests as outpatient.    When arrhythmia seen to be better controlled, discharge planning per primary team.

## 2018-10-03 NOTE — CONSULT NOTE ADULT - SUBJECTIVE AND OBJECTIVE BOX
Patient seen and evaluated @ 9am on   Chief Complaint: Episodic dyspnea    HPI:  83 year-old woman with GERD and IBS, type 2 DM on metformin and repaglinide (followed by Dr. Eladio Velazquez), hyperlipidemia last admission to [place] in , with the patient self referring to [place] following an episode of epigastric pain with subjective dyspnea following an episode with a stressful event with a family meeting (patient refused to elaborate). Patient with a similar episode one month ago. Patient denies abdominal pain at present. No dyspnea on exertion. No chest pain/pressure. No cough. No fever, no chills, no rigors. Patient seen to have PAT on tele associated with symptoms.    PMH:   HLD (hyperlipidemia)  IBS (irritable bowel syndrome)  Diabetes mellitus type 2, controlled  HTN - Hypertension    PSH:   H/O: hysterectomy    Medications:   amiodarone    Tablet 400 milliGRAM(s) Oral two times a day  aspirin enteric coated 81 milliGRAM(s) Oral daily  dextrose 40% Gel 15 Gram(s) Oral once PRN  dextrose 5%. 1000 milliLiter(s) IV Continuous <Continuous>  dextrose 50% Injectable 12.5 Gram(s) IV Push once  dextrose 50% Injectable 25 Gram(s) IV Push once  dextrose 50% Injectable 25 Gram(s) IV Push once  donepezil 5 milliGRAM(s) Oral at bedtime  famotidine    Tablet 20 milliGRAM(s) Oral daily  glucagon  Injectable 1 milliGRAM(s) IntraMuscular once PRN  heparin  Injectable 5000 Unit(s) SubCutaneous every 8 hours  influenza   Vaccine 0.5 milliLiter(s) IntraMuscular once  insulin lispro (HumaLOG) corrective regimen sliding scale   SubCutaneous three times a day before meals  insulin lispro (HumaLOG) corrective regimen sliding scale   SubCutaneous at bedtime  losartan 50 milliGRAM(s) Oral daily  metoprolol succinate ER 50 milliGRAM(s) Oral daily  multivitamin 1 Tablet(s) Oral daily  rosuvastatin 20 milliGRAM(s) Oral at bedtime    Allergies:  No Known Allergies    FAMILY HISTORY:  No pertinent family history in first degree relatives    Social History: , lives alone  Smoking: former  Alcohol: no EtOH abuse    Review of Systems:  dyspnea with PAT on tele, otherwise  Constitutional: No fever, chills, fatigue, or changes in weight  HEENT: No blurry vision, eye pain, headache, runny nose, or sore throat  Respiratory: No shortness of breath, cough, or wheezing  Cardiovascular: No chest pain or palpitations  Gastrointestinal: No nausea, vomiting, diarrhea, or abdominal pain  Genitourinary: No dysuria or incontinence  Extremities: No lower extremity swelling  Neurologic: No focal findings  Lymphatic: No lymphadenopathy   Skin: No rash  Psychiatry: No anxiety or depression  10 point review of systems is otherwise negative except as mentioned above            Physical Exam:  T(C): 37.1 (10-03-18 @ 11:39), Max: 37.1 (10-03-18 @ 11:39)  HR: 76 (10-03-18 @ 11:39) (58 - 76)  BP: 101/59 (10-03-18 @ 11:39) (101/59 - 145/68)  RR: 18 (10-03-18 @ 11:39) (18 - 18)  SpO2: 96% (10-03-18 @ 11:39) (94% - 98%)  Wt(kg): --    10-02 @ 07:  -  10-03 @ 07:00  --------------------------------------------------------  IN: 480 mL / OUT: 300 mL / NET: 180 mL    10-03 @ 07:01  -  10-03 @ 19:02  --------------------------------------------------------  IN: 480 mL / OUT: 300 mL / NET: 180 mL      Daily     Daily Weight in k.9 (03 Oct 2018 08:55)    Appearance: Normal, well groomed, NAD  Eyes: PERRLA, EOMI, pink conjunctiva, no scleral icterus   HENT: Normal oral mucosa  Cardiovascular: RRR, S1, S2, no murmur, rub, or gallop; no edema; no JVD  Procedural Access Site: Clean, dry, intact, without hematoma  Respiratory: Clear to auscultation bilaterally  Gastrointestinal: Soft, non-tender, non-distended, BS+  Musculoskeletal: No clubbing or joint deformity   Neurologic: No focal weakness  Lymphatic: No lymphadenopathy  Psychiatry: AAOx3 with appropriate mood and affect  Skin: No rashes, ecchymoses, or cyanosis    Cardiovascular Diagnostic Testing:    ECG: sinus with PAC    Echo: < from: Transthoracic Echocardiogram (10.03.18 @ 14:11) >  ------------------------------------------------------------------------  Observations:  Mitral Valve: Thickened mitral valve. Mild mitral  regurgitation.  Aortic Valve/Aorta: Normal trileaflet aortic valve.  Aortic Root: 2.6 cm.  Left Atrium: Normal left atrium.  LA volume index = 22  cc/m2.  Left Ventricle: Endocardium not well visualized; grossly  normal left ventricular systolic function. Normal diastolic  function  Right Heart: Normal right atrium. The right ventricle is  not well visualized; grossly normal right ventricular  systolic function. Normal tricuspid valve. Minimal  tricuspid regurgitation. Normal pulmonic valve. No pulmonic  regurgitation.  Pericardium/Pleura: Normal pericardium with no pericardial  effusion.  Hemodynamic: Estimated right atrial pressure is 8 mm Hg.  ------------------------------------------------------------------------  Conclusions:  1. Endocardium not well visualized; grossly normal left  ventricular systolic function.  ------------------------------------------------------------------------  Confirmed on  10/3/2018 - 16:38:35 by Keven Moya M.D.  ------------------------------------------------------------------------    < end of copied text >    Interpretation of Telemetry: Sinus with PAT    Labs:                        11.2   6.41  )-----------( 276      ( 03 Oct 2018 07:43 )             33.8     10-03    144  |  106  |  18  ----------------------------<  118<H>  4.3   |  27  |  1.10    Ca    9.7      03 Oct 2018 06:31    TPro  7.3  /  Alb  4.3  /  TBili  0.2  /  DBili  x   /  AST  25  /  ALT  11  /  AlkPhos  52  10    PT/INR - ( 02 Oct 2018 14:12 )   PT: 11.2 sec;   INR: 1.03 ratio         PTT - ( 02 Oct 2018 14:12 )  PTT:29.1 sec          Hemoglobin A1C, Whole Blood: 7.3 % (10-03 @ 07:43)

## 2018-10-03 NOTE — PROGRESS NOTE ADULT - ASSESSMENT
84 yo female h/o htn, chol, dm, gerd, a/w atypical chest pain    noted to have PAT on tele  EP eval noted  echo pending  cont tele monitor  increase beta blocker as tolerated  check tsh  cards f/u    chol  cont statin    DM  on metformin  f/u a1c  iss    htn  cont current meds  bp acceptable    dvp ppx

## 2018-10-03 NOTE — PROGRESS NOTE ADULT - SUBJECTIVE AND OBJECTIVE BOX
CARMENCITA JENKINS  83y Female  MRN:[id number]    Patient is a 83y old  Female who presents with a chief complaint of Episode of epigastric discomfort with dyspnea (03 Oct 2018 13:56)    HPI:  NIGHT HOSPITALIST:   Patient UNKNOWN to me previously, assigned to me at this point via the ER and by Dr. Appiah to admit this 84 y/o F--patient followed by her office physicians above--patient with a history of reported GERD and IBS, type 2 DM on metformin and repaglinide (followed by Dr. Eladio Velazquez), hyperlipidemia last admission to [place] in 2012, with the patient self referring to [place] following an episode of epigastric pain with subjective dyspnea following an episode with a stressful event with a family meeting (patient refused to elaborate).  Patient with a similar episode one month ago.  Patient denies abdominal pain at present.  No dyspnea on exertion.  No chest pain/pressure.   No cough.  No fever, no chills, no rigors.   Patient with intermittently on oral iron (followed by GI Dr. Walsh above) with reported GERD, IBS but admits to a 4 kg weight loss for months that patient attributes to voluntary intake.  No dysuria, no hematuria.   Remaining review of systems not contributory. (02 Oct 2018 23:35)      Patient seen and evaluated at bedside. No acute events overnight except as noted.    Interval HPI: PAT on tele     PAST MEDICAL & SURGICAL HISTORY:  HLD (hyperlipidemia)  IBS (irritable bowel syndrome)  Diabetes mellitus type 2, controlled  HTN - Hypertension  H/O: hysterectomy: for cervical dysplasia      REVIEW OF SYSTEMS:  Constitutional: No fever, chills, fatigue or weight loss.  Skin: No rash.  Eyes: No recent vision problems or eye pain.  ENT: No congestion, ear pain, or sore throat.  Endocrine: No thyroid problems.  Cardiovascular: as per hpi  Respiratory: No cough, shortness of breath, congestion, or wheezing.  Gastrointestinal: No abdominal pain, nausea, vomiting, or diarrhea.  Genitourinary: No dysuria.  Musculoskeletal: No joint swelling.  Neurologic: No headache.    VITALS:  Vital Signs Last 24 Hrs  T(C): 37.1 (03 Oct 2018 11:39), Max: 37.1 (03 Oct 2018 11:39)  T(F): 98.8 (03 Oct 2018 11:39), Max: 98.8 (03 Oct 2018 11:39)  HR: 76 (03 Oct 2018 11:39) (58 - 76)  BP: 101/59 (03 Oct 2018 11:39) (101/59 - 156/73)  BP(mean): --  RR: 18 (03 Oct 2018 11:39) (18 - 18)  SpO2: 96% (03 Oct 2018 11:39) (94% - 98%)  CAPILLARY BLOOD GLUCOSE  123 (03 Oct 2018 07:46)      POCT Blood Glucose.: 171 mg/dL (03 Oct 2018 12:06)  POCT Blood Glucose.: 123 mg/dL (03 Oct 2018 07:46)  POCT Blood Glucose.: 93 mg/dL (02 Oct 2018 19:38)    I&O's Summary    02 Oct 2018 07:01  -  03 Oct 2018 07:00  --------------------------------------------------------  IN: 480 mL / OUT: 300 mL / NET: 180 mL    03 Oct 2018 07:01  -  03 Oct 2018 14:38  --------------------------------------------------------  IN: 480 mL / OUT: 300 mL / NET: 180 mL        PHYSICAL EXAM:  GENERAL: NAD, well-developed  HEAD:  Atraumatic, Normocephalic  EYES: EOMI, PERRLA, conjunctiva and sclera clear  NECK: Supple, No JVD  CHEST/LUNG: Clear to auscultation bilaterally; No wheeze  HEART: S1, S2; No murmurs, rubs, or gallops  ABDOMEN: Soft, Nontender, Nondistended; Bowel sounds present  EXTREMITIES:  2+ Peripheral Pulses, No clubbing, cyanosis, or edema  PSYCH: Normal affect  NEUROLOGY: AAOX3; non-focal  SKIN: No rashes or lesions    Consultant(s) Notes Reviewed:  [x ] YES  [ ] NO  Care Discussed with Consultants/Other Providers [ x] YES  [ ] NO    MEDS:  MEDICATIONS  (STANDING):  aspirin enteric coated 81 milliGRAM(s) Oral daily  dextrose 5%. 1000 milliLiter(s) (50 mL/Hr) IV Continuous <Continuous>  dextrose 50% Injectable 12.5 Gram(s) IV Push once  dextrose 50% Injectable 25 Gram(s) IV Push once  dextrose 50% Injectable 25 Gram(s) IV Push once  donepezil 5 milliGRAM(s) Oral at bedtime  famotidine    Tablet 20 milliGRAM(s) Oral daily  heparin  Injectable 5000 Unit(s) SubCutaneous every 8 hours  influenza   Vaccine 0.5 milliLiter(s) IntraMuscular once  insulin lispro (HumaLOG) corrective regimen sliding scale   SubCutaneous three times a day before meals  insulin lispro (HumaLOG) corrective regimen sliding scale   SubCutaneous at bedtime  losartan 50 milliGRAM(s) Oral daily  metoprolol succinate ER 50 milliGRAM(s) Oral daily  multivitamin 1 Tablet(s) Oral daily  rosuvastatin 20 milliGRAM(s) Oral at bedtime    MEDICATIONS  (PRN):  dextrose 40% Gel 15 Gram(s) Oral once PRN Blood Glucose LESS THAN 70 milliGRAM(s)/deciliter  glucagon  Injectable 1 milliGRAM(s) IntraMuscular once PRN Glucose LESS THAN 70 milligrams/deciliter    ALLERGIES:  No Known Allergies      LABS:                        11.2   6.41  )-----------( 276      ( 03 Oct 2018 07:43 )             33.8     10-03    144  |  106  |  18  ----------------------------<  118<H>  4.3   |  27  |  1.10    Ca    9.7      03 Oct 2018 06:31    TPro  7.3  /  Alb  4.3  /  TBili  0.2  /  DBili  x   /  AST  25  /  ALT  11  /  AlkPhos  52  10-02    PT/INR - ( 02 Oct 2018 14:12 )   PT: 11.2 sec;   INR: 1.03 ratio         PTT - ( 02 Oct 2018 14:12 )  PTT:29.1 sec      LIVER FUNCTIONS - ( 02 Oct 2018 14:12 )  Alb: 4.3 g/dL / Pro: 7.3 g/dL / ALK PHOS: 52 U/L / ALT: 11 U/L / AST: 25 U/L / GGT: x           Urinalysis Basic - ( 03 Oct 2018 02:00 )    Color: x / Appearance: x / SG: x / pH: x  Gluc: x / Ketone: x  / Bili: x / Urobili: x   Blood: x / Protein: x / Nitrite: x   Leuk Esterase: x / RBC: 4 /hpf / WBC 7 /hpf   Sq Epi: x / Non Sq Epi: 7 /hpf / Bacteria: Many      TSH:   A1c:Hemoglobin A1C, Whole Blood: 7.3 % (10-03 @ 07:43)    BNP:  Lipid panel:   cultures:     RADIOLOGY & ADDITIONAL TESTS:    Imaging Personally Reviewed:  [ ] YES  [ ] NO

## 2018-10-03 NOTE — PROVIDER CONTACT NOTE (OTHER) - ACTION/TREATMENT ORDERED:
Provider aware, no new orders at this time. Will continue to monitor.
provider made aware. no further action at this time. will continue to monitor. pt safety maintained.

## 2018-10-04 LAB
GLUCOSE BLDC GLUCOMTR-MCNC: 141 MG/DL — HIGH (ref 70–99)
GLUCOSE BLDC GLUCOMTR-MCNC: 174 MG/DL — HIGH (ref 70–99)
GLUCOSE BLDC GLUCOMTR-MCNC: 204 MG/DL — HIGH (ref 70–99)
GLUCOSE BLDC GLUCOMTR-MCNC: 217 MG/DL — HIGH (ref 70–99)
TSH SERPL-MCNC: 1.6 UIU/ML — SIGNIFICANT CHANGE UP (ref 0.27–4.2)

## 2018-10-04 PROCEDURE — 99232 SBSQ HOSP IP/OBS MODERATE 35: CPT

## 2018-10-04 PROCEDURE — 99233 SBSQ HOSP IP/OBS HIGH 50: CPT

## 2018-10-04 RX ORDER — METOPROLOL TARTRATE 50 MG
25 TABLET ORAL DAILY
Qty: 0 | Refills: 0 | Status: DISCONTINUED | OUTPATIENT
Start: 2018-10-04 | End: 2018-10-05

## 2018-10-04 RX ADMIN — HEPARIN SODIUM 5000 UNIT(S): 5000 INJECTION INTRAVENOUS; SUBCUTANEOUS at 13:24

## 2018-10-04 RX ADMIN — HEPARIN SODIUM 5000 UNIT(S): 5000 INJECTION INTRAVENOUS; SUBCUTANEOUS at 06:26

## 2018-10-04 RX ADMIN — AMIODARONE HYDROCHLORIDE 400 MILLIGRAM(S): 400 TABLET ORAL at 18:57

## 2018-10-04 RX ADMIN — LOSARTAN POTASSIUM 50 MILLIGRAM(S): 100 TABLET, FILM COATED ORAL at 06:26

## 2018-10-04 RX ADMIN — Medication 1 TABLET(S): at 12:17

## 2018-10-04 RX ADMIN — DONEPEZIL HYDROCHLORIDE 5 MILLIGRAM(S): 10 TABLET, FILM COATED ORAL at 21:31

## 2018-10-04 RX ADMIN — FAMOTIDINE 20 MILLIGRAM(S): 10 INJECTION INTRAVENOUS at 12:17

## 2018-10-04 RX ADMIN — ROSUVASTATIN CALCIUM 20 MILLIGRAM(S): 5 TABLET ORAL at 21:31

## 2018-10-04 RX ADMIN — Medication 1: at 08:19

## 2018-10-04 RX ADMIN — Medication 25 MILLIGRAM(S): at 12:17

## 2018-10-04 RX ADMIN — HEPARIN SODIUM 5000 UNIT(S): 5000 INJECTION INTRAVENOUS; SUBCUTANEOUS at 21:31

## 2018-10-04 RX ADMIN — AMIODARONE HYDROCHLORIDE 400 MILLIGRAM(S): 400 TABLET ORAL at 06:29

## 2018-10-04 RX ADMIN — Medication 81 MILLIGRAM(S): at 12:17

## 2018-10-04 RX ADMIN — Medication 2: at 12:17

## 2018-10-04 NOTE — PROGRESS NOTE ADULT - ASSESSMENT
#SVT:  83F hx HTN, HLD, DMII, GERD, IBS referred to [facility] ED from office for an episode of epigastric pain with subjective dyspnea following an episode with a stressful event with a family meeting (patient refused to elaborate).  Patient with a similar episode one month ago. Endorsing "indigestion" when tachycardic rhythm is sustained which may be concerning for anginal equivalent. Patient short of breath as well after episodes. Lastly, patient is also symptomatic from just APCs as well.    -Rates and PAC burden much improved.    #Assessment:  High APC burden  Atrial tachycardia.    #Plan:  -Amiodarone 400 mg BID x 7 days, Amiodarone 200 mg BID x 7 days, Amiodarone 200 mg daily  -Check Thyroid and LFT panels  -EP to sign off

## 2018-10-04 NOTE — PROGRESS NOTE ADULT - SUBJECTIVE AND OBJECTIVE BOX
HPI:  No further PAT since starting amiodarone.  Sinus bradycardia overnight noted.    Review Of Systems:           Respiratory: No shortness of breath, cough, or wheezing  Cardiovascular: No chest pain or palpitations  10 point review of systems is otherwise negative except as mentioned above        Medications:  amiodarone    Tablet 400 milliGRAM(s) Oral two times a day  aspirin enteric coated 81 milliGRAM(s) Oral daily  dextrose 40% Gel 15 Gram(s) Oral once PRN  dextrose 5%. 1000 milliLiter(s) IV Continuous <Continuous>  dextrose 50% Injectable 12.5 Gram(s) IV Push once  dextrose 50% Injectable 25 Gram(s) IV Push once  dextrose 50% Injectable 25 Gram(s) IV Push once  donepezil 5 milliGRAM(s) Oral at bedtime  famotidine    Tablet 20 milliGRAM(s) Oral daily  glucagon  Injectable 1 milliGRAM(s) IntraMuscular once PRN  heparin  Injectable 5000 Unit(s) SubCutaneous every 8 hours  influenza   Vaccine 0.5 milliLiter(s) IntraMuscular once  insulin lispro (HumaLOG) corrective regimen sliding scale   SubCutaneous three times a day before meals  insulin lispro (HumaLOG) corrective regimen sliding scale   SubCutaneous at bedtime  losartan 50 milliGRAM(s) Oral daily  metoprolol succinate ER 50 milliGRAM(s) Oral daily  multivitamin 1 Tablet(s) Oral daily  rosuvastatin 20 milliGRAM(s) Oral at bedtime    PAST MEDICAL & SURGICAL HISTORY:  HLD (hyperlipidemia)  IBS (irritable bowel syndrome)  Diabetes mellitus type 2, controlled  HTN - Hypertension  H/O: hysterectomy: for cervical dysplasia    Vitals:  T(C): 36.6 (10-04-18 @ 04:33), Max: 37.2 (10-03-18 @ 20:07)  HR: 55 (10-04-18 @ 06:25) (55 - 76)  BP: 142/61 (10-04-18 @ 06:25) (101/59 - 142/61)  BP(mean): --  RR: 18 (10-04-18 @ 04:33) (18 - 18)  SpO2: 97% (10-04-18 @ 04:33) (96% - 97%)  Wt(kg): --  Daily     Daily Weight in k (04 Oct 2018 08:38)  I&O's Summary    03 Oct 2018 07:01  -  04 Oct 2018 07:00  --------------------------------------------------------  IN: 720 mL / OUT: 300 mL / NET: 420 mL    04 Oct 2018 07:01  -  04 Oct 2018 09:45  --------------------------------------------------------  IN: 400 mL / OUT: 0 mL / NET: 400 mL        Physical Exam:  Appearance: No acute distress; well appearing  Eyes: PERRL, EOMI, pink conjunctiva  HENT: Normal oral mucosa  Cardiovascular: RRR, S1, S2, no murmurs, rubs, or gallops; no edema; no JVD  Respiratory: Clear to auscultation bilaterally  Gastrointestinal: soft, non-tender, non-distended with normal bowel sounds  Musculoskeletal: No clubbing; no joint deformity   Neurologic: Non-focal  Lymphatic: No lymphadenopathy  Psychiatry: AAOx3, mood & affect appropriate  Skin: No rashes, ecchymoses, or cyanosis                          11.2   6.41  )-----------( 276      ( 03 Oct 2018 07:43 )             33.8     10-03    144  |  106  |  18  ----------------------------<  118<H>  4.3   |  27  |  1.10    Ca    9.7      03 Oct 2018 06:31    TPro  7.3  /  Alb  4.3  /  TBili  0.2  /  DBili  x   /  AST  25  /  ALT  11  /  AlkPhos  52  10-02    PT/INR - ( 02 Oct 2018 14:12 )   PT: 11.2 sec;   INR: 1.03 ratio         PTT - ( 02 Oct 2018 14:12 )  PTT:29.1 sec          ECG: sinus with PAC    Echo: < from: Transthoracic Echocardiogram (10.03.18 @ 14:11) >  ------------------------------------------------------------------------  Observations:  Mitral Valve: Thickened mitral valve. Mild mitral  regurgitation.  Aortic Valve/Aorta: Normal trileaflet aortic valve.  Aortic Root: 2.6 cm.  Left Atrium: Normal left atrium.  LA volume index = 22  cc/m2.  Left Ventricle: Endocardium not well visualized; grossly  normal left ventricular systolic function. Normal diastolic  function  Right Heart: Normal right atrium. The right ventricle is  not well visualized; grossly normal right ventricular  systolic function. Normal tricuspid valve. Minimal  tricuspid regurgitation. Normal pulmonic valve. No pulmonic  regurgitation.  Pericardium/Pleura: Normal pericardium with no pericardial  effusion.  Hemodynamic: Estimated right atrial pressure is 8 mm Hg.  ------------------------------------------------------------------------  Conclusions:  1. Endocardium not well visualized; grossly normal left  ventricular systolic function.  ------------------------------------------------------------------------  Confirmed on  10/3/2018 - 16:38:35 by Keven Moya M.D.  ------------------------------------------------------------------------    < end of copied text >    Interpretation of Telemetry: Sinus without further PAT

## 2018-10-04 NOTE — PROGRESS NOTE ADULT - SUBJECTIVE AND OBJECTIVE BOX
CARMENCITA JENKINS  83y Female  MRN:[id number]    Patient is a 83y old  Female who presents with a chief complaint of Episode of epigastric discomfort with dyspnea (03 Oct 2018 13:56)    HPI:  NIGHT HOSPITALIST:   Patient UNKNOWN to me previously, assigned to me at this point via the ER and by Dr. Appiah to admit this 82 y/o F--patient followed by her office physicians above--patient with a history of reported GERD and IBS, type 2 DM on metformin and repaglinide (followed by Dr. Eladio Velazquez), hyperlipidemia last admission to [place] in 2012, with the patient self referring to [place] following an episode of epigastric pain with subjective dyspnea following an episode with a stressful event with a family meeting (patient refused to elaborate).  Patient with a similar episode one month ago.  Patient denies abdominal pain at present.  No dyspnea on exertion.  No chest pain/pressure.   No cough.  No fever, no chills, no rigors.   Patient with intermittently on oral iron (followed by GI Dr. Walsh above) with reported GERD, IBS but admits to a 4 kg weight loss for months that patient attributes to voluntary intake.  No dysuria, no hematuria.   Remaining review of systems not contributory. (02 Oct 2018 23:35)      Patient seen and evaluated at bedside. No acute events overnight except as noted.    Interval HPI: sinus on tele    PAST MEDICAL & SURGICAL HISTORY:  HLD (hyperlipidemia)  IBS (irritable bowel syndrome)  Diabetes mellitus type 2, controlled  HTN - Hypertension  H/O: hysterectomy: for cervical dysplasia         VITALS:  Vital Signs Last 24 Hrs  T(C): 36.9 (04 Oct 2018 11:42), Max: 37.2 (03 Oct 2018 20:07)  T(F): 98.5 (04 Oct 2018 11:42), Max: 99 (03 Oct 2018 20:07)  HR: 60 (04 Oct 2018 11:42) (55 - 65)  BP: 103/56 (04 Oct 2018 11:42) (103/56 - 142/61)  BP(mean): --  RR: 18 (04 Oct 2018 11:42) (18 - 18)  SpO2: 97% (04 Oct 2018 11:42) (96% - 97%)      PHYSICAL EXAM:  GENERAL: NAD, well-developed  HEAD:  Atraumatic, Normocephalic  EYES: EOMI, PERRLA, conjunctiva and sclera clear  NECK: Supple, No JVD  CHEST/LUNG: Clear to auscultation bilaterally; No wheeze  HEART: S1, S2; No murmurs, rubs, or gallops  ABDOMEN: Soft, Nontender, Nondistended; Bowel sounds present  EXTREMITIES:  2+ Peripheral Pulses, No clubbing, cyanosis, or edema  PSYCH: Normal affect  NEUROLOGY: AAOX3; non-focal  SKIN: No rashes or lesions    Consultant(s) Notes Reviewed:  [x ] YES  [ ] NO  Care Discussed with Consultants/Other Providers [ x] YES  [ ] NO    MEDS:  MEDICATIONS  (STANDING):  amiodarone    Tablet 400 milliGRAM(s) Oral two times a day  aspirin enteric coated 81 milliGRAM(s) Oral daily  dextrose 5%. 1000 milliLiter(s) (50 mL/Hr) IV Continuous <Continuous>  dextrose 50% Injectable 12.5 Gram(s) IV Push once  dextrose 50% Injectable 25 Gram(s) IV Push once  dextrose 50% Injectable 25 Gram(s) IV Push once  donepezil 5 milliGRAM(s) Oral at bedtime  famotidine    Tablet 20 milliGRAM(s) Oral daily  heparin  Injectable 5000 Unit(s) SubCutaneous every 8 hours  influenza   Vaccine 0.5 milliLiter(s) IntraMuscular once  insulin lispro (HumaLOG) corrective regimen sliding scale   SubCutaneous three times a day before meals  insulin lispro (HumaLOG) corrective regimen sliding scale   SubCutaneous at bedtime  losartan 50 milliGRAM(s) Oral daily  metoprolol succinate ER 25 milliGRAM(s) Oral daily  multivitamin 1 Tablet(s) Oral daily  rosuvastatin 20 milliGRAM(s) Oral at bedtime    MEDICATIONS  (PRN):  dextrose 40% Gel 15 Gram(s) Oral once PRN Blood Glucose LESS THAN 70 milliGRAM(s)/deciliter  glucagon  Injectable 1 milliGRAM(s) IntraMuscular once PRN Glucose LESS THAN 70 milligrams/deciliter      ALLERGIES:  No Known Allergies      LABS:                                       11.2   6.41  )-----------( 276      ( 03 Oct 2018 07:43 )             33.8   10-03    144  |  106  |  18  ----------------------------<  118<H>  4.3   |  27  |  1.10    Ca    9.7      03 Oct 2018 06:31    < from: Transthoracic Echocardiogram (10.03.18 @ 14:11) >  ----------------------------------------  Conclusions:  1. Endocardium not well visualized; grossly normal left  ventricular systolic function.  ----------------------------------    < end of copied text >

## 2018-10-04 NOTE — PROGRESS NOTE ADULT - SUBJECTIVE AND OBJECTIVE BOX
Patient seen and examined at bedside.    Overnight Events:   Telemetry with minimal PACs, no sustained AT. Rates as low as 45 during sleep    Review Of Systems: No chest pain, shortness of breath, or palpitations            Current Meds:  amiodarone    Tablet 400 milliGRAM(s) Oral two times a day  aspirin enteric coated 81 milliGRAM(s) Oral daily  dextrose 40% Gel 15 Gram(s) Oral once PRN  dextrose 5%. 1000 milliLiter(s) IV Continuous <Continuous>  dextrose 50% Injectable 12.5 Gram(s) IV Push once  dextrose 50% Injectable 25 Gram(s) IV Push once  dextrose 50% Injectable 25 Gram(s) IV Push once  donepezil 5 milliGRAM(s) Oral at bedtime  famotidine    Tablet 20 milliGRAM(s) Oral daily  glucagon  Injectable 1 milliGRAM(s) IntraMuscular once PRN  heparin  Injectable 5000 Unit(s) SubCutaneous every 8 hours  influenza   Vaccine 0.5 milliLiter(s) IntraMuscular once  insulin lispro (HumaLOG) corrective regimen sliding scale   SubCutaneous three times a day before meals  insulin lispro (HumaLOG) corrective regimen sliding scale   SubCutaneous at bedtime  losartan 50 milliGRAM(s) Oral daily  metoprolol succinate ER 25 milliGRAM(s) Oral daily  multivitamin 1 Tablet(s) Oral daily  rosuvastatin 20 milliGRAM(s) Oral at bedtime      Vitals:  T(F): 98.5 (10-04), Max: 99 (10-03)  HR: 60 (10-04) (55 - 65)  BP: 103/56 (10-04) (103/56 - 142/61)  RR: 18 (10-04)  SpO2: 97% (10-04)  I&O's Summary    03 Oct 2018 07:01  -  04 Oct 2018 07:00  --------------------------------------------------------  IN: 720 mL / OUT: 300 mL / NET: 420 mL    04 Oct 2018 07:01  -  04 Oct 2018 18:06  --------------------------------------------------------  IN: 880 mL / OUT: 0 mL / NET: 880 mL        Physical Exam:  Appearance: No acute distress; well appearing  Eyes: PERRL, EOMI, pink conjunctiva  HENT: Normal oral mucosa  Cardiovascular: RRR, S1, S2, no murmurs, rubs, or gallops; no edema; no JVD  Respiratory: Clear to auscultation bilaterally  Gastrointestinal: soft, non-tender, non-distended with normal bowel sounds  Musculoskeletal: No clubbing; no joint deformity   Neurologic: Non-focal  Lymphatic: No lymphadenopathy  Psychiatry: AAOx3, mood & affect appropriate  Skin: No rashes, ecchymoses, or cyanosis                          11.2   6.41  )-----------( 276      ( 03 Oct 2018 07:43 )             33.8     10-03    144  |  106  |  18  ----------------------------<  118<H>  4.3   |  27  |  1.10    Ca    9.7      03 Oct 2018 06:31        CARDIAC MARKERS ( 02 Oct 2018 15:30 )  17 ng/L / x     / x     / x     / x     / x      CARDIAC MARKERS ( 02 Oct 2018 14:12 )  18 ng/L / x     / x     / x     / x     / x

## 2018-10-04 NOTE — PROGRESS NOTE ADULT - ASSESSMENT
83 year-old with symptomatic PAT, now controlled with initiation of amiodarone load.  Appreciate EP consult - agree with amiodarone for 83 year-old with PAT despite beta-blocker.    Will monitor pulmonary function, thyroid function, and liver function tests as outpatient.    Would reduce beta-blocker to home dose while also on amiodarone.  When arrhythmia seen to be better controlled, discharge planning per primary team.

## 2018-10-04 NOTE — PROGRESS NOTE ADULT - ASSESSMENT
82 yo female h/o htn, chol, dm, gerd, a/w atypical chest pain    noted to have PAT on tele  EP eval noted  echo noted  amio started as per EP  cont tele monitor  decrease beta blocker back to home dose 25 daily  cards f/u    chol  cont statin    DM  on metformin  iss    htn  cont current meds  bp acceptable    dvp ppx    dc planning

## 2018-10-04 NOTE — ED ADULT TRIAGE NOTE - NS ED TRIAGE AVPU SCALE

## 2018-10-05 ENCOUNTER — TRANSCRIPTION ENCOUNTER (OUTPATIENT)
Age: 83
End: 2018-10-05

## 2018-10-05 VITALS
TEMPERATURE: 99 F | HEART RATE: 68 BPM | DIASTOLIC BLOOD PRESSURE: 61 MMHG | RESPIRATION RATE: 18 BRPM | OXYGEN SATURATION: 96 % | SYSTOLIC BLOOD PRESSURE: 108 MMHG

## 2018-10-05 LAB
ANION GAP SERPL CALC-SCNC: 10 MMOL/L — SIGNIFICANT CHANGE UP (ref 5–17)
BUN SERPL-MCNC: 28 MG/DL — HIGH (ref 7–23)
CALCIUM SERPL-MCNC: 9.9 MG/DL — SIGNIFICANT CHANGE UP (ref 8.4–10.5)
CHLORIDE SERPL-SCNC: 103 MMOL/L — SIGNIFICANT CHANGE UP (ref 96–108)
CO2 SERPL-SCNC: 26 MMOL/L — SIGNIFICANT CHANGE UP (ref 22–31)
CREAT SERPL-MCNC: 1.21 MG/DL — SIGNIFICANT CHANGE UP (ref 0.5–1.3)
GLUCOSE BLDC GLUCOMTR-MCNC: 168 MG/DL — HIGH (ref 70–99)
GLUCOSE BLDC GLUCOMTR-MCNC: 196 MG/DL — HIGH (ref 70–99)
GLUCOSE SERPL-MCNC: 182 MG/DL — HIGH (ref 70–99)
HCT VFR BLD CALC: 32.9 % — LOW (ref 34.5–45)
HGB BLD-MCNC: 11.2 G/DL — LOW (ref 11.5–15.5)
MCHC RBC-ENTMCNC: 30 PG — SIGNIFICANT CHANGE UP (ref 27–34)
MCHC RBC-ENTMCNC: 34 GM/DL — SIGNIFICANT CHANGE UP (ref 32–36)
MCV RBC AUTO: 88.2 FL — SIGNIFICANT CHANGE UP (ref 80–100)
PLATELET # BLD AUTO: 255 K/UL — SIGNIFICANT CHANGE UP (ref 150–400)
POTASSIUM SERPL-MCNC: 4.4 MMOL/L — SIGNIFICANT CHANGE UP (ref 3.5–5.3)
POTASSIUM SERPL-SCNC: 4.4 MMOL/L — SIGNIFICANT CHANGE UP (ref 3.5–5.3)
RBC # BLD: 3.73 M/UL — LOW (ref 3.8–5.2)
RBC # FLD: 13.7 % — SIGNIFICANT CHANGE UP (ref 10.3–14.5)
SODIUM SERPL-SCNC: 139 MMOL/L — SIGNIFICANT CHANGE UP (ref 135–145)
WBC # BLD: 6.24 K/UL — SIGNIFICANT CHANGE UP (ref 3.8–10.5)
WBC # FLD AUTO: 6.24 K/UL — SIGNIFICANT CHANGE UP (ref 3.8–10.5)

## 2018-10-05 PROCEDURE — 99233 SBSQ HOSP IP/OBS HIGH 50: CPT

## 2018-10-05 RX ORDER — DONEPEZIL HYDROCHLORIDE 10 MG/1
1 TABLET, FILM COATED ORAL
Qty: 0 | Refills: 0 | DISCHARGE
Start: 2018-10-05

## 2018-10-05 RX ORDER — METOPROLOL TARTRATE 50 MG
1 TABLET ORAL
Qty: 0 | Refills: 0 | DISCHARGE
Start: 2018-10-05

## 2018-10-05 RX ORDER — ASPIRIN/CALCIUM CARB/MAGNESIUM 324 MG
1 TABLET ORAL
Qty: 0 | Refills: 0 | COMMUNITY

## 2018-10-05 RX ORDER — ROSUVASTATIN CALCIUM 5 MG/1
1 TABLET ORAL
Qty: 0 | Refills: 0 | COMMUNITY

## 2018-10-05 RX ORDER — DONEPEZIL HYDROCHLORIDE 10 MG/1
1 TABLET, FILM COATED ORAL
Qty: 0 | Refills: 0 | COMMUNITY

## 2018-10-05 RX ORDER — ROSUVASTATIN CALCIUM 5 MG/1
1 TABLET ORAL
Qty: 0 | Refills: 0 | DISCHARGE
Start: 2018-10-05

## 2018-10-05 RX ORDER — AMIODARONE HYDROCHLORIDE 400 MG/1
2 TABLET ORAL
Qty: 20 | Refills: 0
Start: 2018-10-05 | End: 2018-10-09

## 2018-10-05 RX ORDER — METOPROLOL TARTRATE 50 MG
1 TABLET ORAL
Qty: 0 | Refills: 0 | COMMUNITY

## 2018-10-05 RX ORDER — FAMOTIDINE 10 MG/ML
1 INJECTION INTRAVENOUS
Qty: 30 | Refills: 0
Start: 2018-10-05 | End: 2018-11-03

## 2018-10-05 RX ORDER — ASPIRIN/CALCIUM CARB/MAGNESIUM 324 MG
1 TABLET ORAL
Qty: 0 | Refills: 0 | DISCHARGE
Start: 2018-10-05

## 2018-10-05 RX ADMIN — AMIODARONE HYDROCHLORIDE 400 MILLIGRAM(S): 400 TABLET ORAL at 06:08

## 2018-10-05 RX ADMIN — INFLUENZA VIRUS VACCINE 0.5 MILLILITER(S): 15; 15; 15; 15 SUSPENSION INTRAMUSCULAR at 15:43

## 2018-10-05 RX ADMIN — HEPARIN SODIUM 5000 UNIT(S): 5000 INJECTION INTRAVENOUS; SUBCUTANEOUS at 06:09

## 2018-10-05 RX ADMIN — HEPARIN SODIUM 5000 UNIT(S): 5000 INJECTION INTRAVENOUS; SUBCUTANEOUS at 13:32

## 2018-10-05 RX ADMIN — LOSARTAN POTASSIUM 50 MILLIGRAM(S): 100 TABLET, FILM COATED ORAL at 06:08

## 2018-10-05 RX ADMIN — Medication 1 TABLET(S): at 11:40

## 2018-10-05 RX ADMIN — Medication 25 MILLIGRAM(S): at 06:10

## 2018-10-05 RX ADMIN — FAMOTIDINE 20 MILLIGRAM(S): 10 INJECTION INTRAVENOUS at 11:40

## 2018-10-05 RX ADMIN — Medication 1: at 08:17

## 2018-10-05 RX ADMIN — Medication 1: at 12:17

## 2018-10-05 RX ADMIN — Medication 81 MILLIGRAM(S): at 11:40

## 2018-10-05 NOTE — DISCHARGE NOTE ADULT - PLAN OF CARE
resolved; determined to be atrial tachycardia Rx Amiodarone 400mg oral 2x/day thru 10/10/18 then 200mg daily  followup cardiologist Yobani Stephenson one week  f/u EP Dr. Baker  avoid caffeine, OTC decongestants resolved; hx GERD Rx Pepcid 20mg oral daily  avoid spicy foods  followup GI followup endocrinologist re. elevated blood sugar inpt.  continue Metformin & Replaniglide home doses stable

## 2018-10-05 NOTE — DISCHARGE NOTE ADULT - PATIENT PORTAL LINK FT
You can access the [facility] Patient Portal, offered by [facility], by registering with the following website: [email or web address]

## 2018-10-05 NOTE — PROGRESS NOTE ADULT - REASON FOR ADMISSION
Episode of epigastric discomfort with dyspnea

## 2018-10-05 NOTE — DISCHARGE NOTE ADULT - SECONDARY DIAGNOSIS.
Epigastric pain Type 2 diabetes mellitus with hyperglycemia, without long-term current use of insulin HTN (hypertension) IBS (irritable bowel syndrome)

## 2018-10-05 NOTE — DISCHARGE NOTE ADULT - CARE PLAN
Principal Discharge DX:	Atypical chest pain  Goal:	resolved; determined to be atrial tachycardia  Assessment and plan of treatment:	Rx Amiodarone 400mg oral 2x/day thru 10/10/18 then 200mg daily  followup cardiologist Yobani Stephenson one week  f/u EP Dr. Baker  avoid caffeine, OTC decongestants  Secondary Diagnosis:	Epigastric pain  Goal:	resolved; hx GERD  Assessment and plan of treatment:	Rx Pepcid 20mg oral daily  avoid spicy foods  followup GI  Secondary Diagnosis:	Type 2 diabetes mellitus with hyperglycemia, without long-term current use of insulin  Assessment and plan of treatment:	followup endocrinologist re. elevated blood sugar inpt.  continue Metformin & Replaniglide home doses  Secondary Diagnosis:	HTN (hypertension)  Goal:	stable  Secondary Diagnosis:	IBS (irritable bowel syndrome)  Goal:	stable

## 2018-10-05 NOTE — PROGRESS NOTE ADULT - SUBJECTIVE AND OBJECTIVE BOX
CARMENCITA JENKINS  83y Female  MRN:[id number]    Patient is a 83y old  Female who presents with a chief complaint of Episode of epigastric discomfort with dyspnea (03 Oct 2018 13:56)    HPI:  NIGHT HOSPITALIST:   Patient UNKNOWN to me previously, assigned to me at this point via the ER and by Dr. Appiah to admit this 82 y/o F--patient followed by her office physicians above--patient with a history of reported GERD and IBS, type 2 DM on metformin and repaglinide (followed by Dr. Eladio Velazquez), hyperlipidemia last admission to [facility] in 2012, with the patient self referring to [facility] following an episode of epigastric pain with subjective dyspnea following an episode with a stressful event with a family meeting (patient refused to elaborate).  Patient with a similar episode one month ago.  Patient denies abdominal pain at present.  No dyspnea on exertion.  No chest pain/pressure.   No cough.  No fever, no chills, no rigors.   Patient with intermittently on oral iron (followed by GI Dr. Walsh above) with reported GERD, IBS but admits to a 4 kg weight loss for months that patient attributes to voluntary intake.  No dysuria, no hematuria.   Remaining review of systems not contributory. (02 Oct 2018 23:35)      Patient seen and evaluated at bedside. No acute events overnight except as noted.    Interval HPI: sinus on tele    PAST MEDICAL & SURGICAL HISTORY:  HLD (hyperlipidemia)  IBS (irritable bowel syndrome)  Diabetes mellitus type 2, controlled  HTN - Hypertension  H/O: hysterectomy: for cervical dysplasia         VITALS:  Vital Signs Last 24 Hrs  T(C): 37.1 (05 Oct 2018 11:37), Max: 37.1 (05 Oct 2018 11:37)  T(F): 98.8 (05 Oct 2018 11:37), Max: 98.8 (05 Oct 2018 11:37)  HR: 68 (05 Oct 2018 11:37) (56 - 89)  BP: 105/61 (05 Oct 2018 11:37) (105/61 - 136/72)  BP(mean): --  RR: 18 (05 Oct 2018 11:37) (17 - 18)  SpO2: 96% (05 Oct 2018 11:37) (95% - 96%)      PHYSICAL EXAM:  GENERAL: NAD, well-developed  HEAD:  Atraumatic, Normocephalic  EYES: EOMI, PERRLA, conjunctiva and sclera clear  NECK: Supple, No JVD  CHEST/LUNG: Clear to auscultation bilaterally; No wheeze  HEART: S1, S2; No murmurs, rubs, or gallops  ABDOMEN: Soft, Nontender, Nondistended; Bowel sounds present  EXTREMITIES:  2+ Peripheral Pulses, No clubbing, cyanosis, or edema  PSYCH: Normal affect  NEUROLOGY: AAOX3; non-focal  SKIN: No rashes or lesions    Consultant(s) Notes Reviewed:  [x ] YES  [ ] NO  Care Discussed with Consultants/Other Providers [ x] YES  [ ] NO    MEDS:  MEDICATIONS  (STANDING):  amiodarone    Tablet 400 milliGRAM(s) Oral two times a day  aspirin enteric coated 81 milliGRAM(s) Oral daily  dextrose 5%. 1000 milliLiter(s) (50 mL/Hr) IV Continuous <Continuous>  dextrose 50% Injectable 12.5 Gram(s) IV Push once  dextrose 50% Injectable 25 Gram(s) IV Push once  dextrose 50% Injectable 25 Gram(s) IV Push once  donepezil 5 milliGRAM(s) Oral at bedtime  famotidine    Tablet 20 milliGRAM(s) Oral daily  heparin  Injectable 5000 Unit(s) SubCutaneous every 8 hours  influenza   Vaccine 0.5 milliLiter(s) IntraMuscular once  insulin lispro (HumaLOG) corrective regimen sliding scale   SubCutaneous three times a day before meals  insulin lispro (HumaLOG) corrective regimen sliding scale   SubCutaneous at bedtime  losartan 50 milliGRAM(s) Oral daily  metoprolol succinate ER 25 milliGRAM(s) Oral daily  multivitamin 1 Tablet(s) Oral daily  rosuvastatin 20 milliGRAM(s) Oral at bedtime    MEDICATIONS  (PRN):  dextrose 40% Gel 15 Gram(s) Oral once PRN Blood Glucose LESS THAN 70 milliGRAM(s)/deciliter  glucagon  Injectable 1 milliGRAM(s) IntraMuscular once PRN Glucose LESS THAN 70 milligrams/deciliter        ALLERGIES:  No Known Allergies      LABS:                                       11.2   6.24  )-----------( 255      ( 05 Oct 2018 08:13 )             32.9   10-05    139  |  103  |  28<H>  ----------------------------<  182<H>  4.4   |  26  |  1.21    Ca    9.9      05 Oct 2018 06:22        < from: Transthoracic Echocardiogram (10.03.18 @ 14:11) >  ----------------------------------------  Conclusions:  1. Endocardium not well visualized; grossly normal left  ventricular systolic function.  ----------------------------------    < end of copied text >

## 2018-10-05 NOTE — DISCHARGE NOTE ADULT - PROVIDER TOKENS
TOKEN:'[id number]:'[id number]',FREE:[LAST:[beach],FIRST:[daryl],PHONE:[(   )    -],FAX:[(   )    -],ADDRESS:[]]

## 2018-10-05 NOTE — PROGRESS NOTE ADULT - ASSESSMENT
84 yo female h/o htn, chol, dm, gerd, a/w atypical chest pain    noted to have PAT on tele  EP eval noted  echo noted  amio started as per EP  cont tele monitor  decreased beta blocker back to home dose 25 daily  cards f/u    chol  cont statin    DM  on metformin  iss    htn  cont current meds  bp acceptable    dvp ppx    dc planning  outpt cards f/u

## 2018-10-05 NOTE — PROGRESS NOTE ADULT - SUBJECTIVE AND OBJECTIVE BOX
Patient feeling well without complaints.  She is somewhat concerned about amiodarone after reading about it on line.  I tried to reassure her.  No arrhythmias on telemetry last night. Patinet ambulating without difficulties.          Vital Signs Last 24 Hrs  T(C): 37 (05 Oct 2018 04:18), Max: 37 (04 Oct 2018 20:03)  T(F): 98.6 (05 Oct 2018 04:18), Max: 98.6 (04 Oct 2018 20:03)  HR: 62 (05 Oct 2018 06:07) (56 - 89)  BP: 136/72 (05 Oct 2018 06:07) (103/56 - 136/72)  BP(mean): --  RR: 18 (05 Oct 2018 04:18) (17 - 18)  SpO2: 95% (05 Oct 2018 04:18) (95% - 97%)  Daily     Daily Weight in k (04 Oct 2018 08:38)  I&O's Summary    04 Oct 2018 07:01  -  05 Oct 2018 07:00  --------------------------------------------------------  IN: 880 mL / OUT: 0 mL / NET: 880 mL        Neck: no bruits, JVD, adenopathy  Chest: clear  Cor: [id number], RR, normal S1S2 with no mrght  Abd: soft, nontender, BS+ and no HSM  Ext: w/o CCE  Pulses:2+->dp bilaterally    MEDICATIONS  (STANDING):  amiodarone    Tablet 400 milliGRAM(s) Oral two times a day  aspirin enteric coated 81 milliGRAM(s) Oral daily  dextrose 5%. 1000 milliLiter(s) (50 mL/Hr) IV Continuous <Continuous>  dextrose 50% Injectable 12.5 Gram(s) IV Push once  dextrose 50% Injectable 25 Gram(s) IV Push once  dextrose 50% Injectable 25 Gram(s) IV Push once  donepezil 5 milliGRAM(s) Oral at bedtime  famotidine    Tablet 20 milliGRAM(s) Oral daily  heparin  Injectable 5000 Unit(s) SubCutaneous every 8 hours  influenza   Vaccine 0.5 milliLiter(s) IntraMuscular once  insulin lispro (HumaLOG) corrective regimen sliding scale   SubCutaneous three times a day before meals  insulin lispro (HumaLOG) corrective regimen sliding scale   SubCutaneous at bedtime  losartan 50 milliGRAM(s) Oral daily  metoprolol succinate ER 25 milliGRAM(s) Oral daily  multivitamin 1 Tablet(s) Oral daily  rosuvastatin 20 milliGRAM(s) Oral at bedtime    MEDICATIONS  (PRN):  dextrose 40% Gel 15 Gram(s) Oral once PRN Blood Glucose LESS THAN 70 milliGRAM(s)/deciliter  glucagon  Injectable 1 milliGRAM(s) IntraMuscular once PRN Glucose LESS THAN 70 milligrams/deciliter      10-05    139  |  103  |  28<H>  ----------------------------<  182<H>  4.4   |  26  |  1.21    Ca    9.9      05 Oct 2018 06:22                            11.2   6.41  )-----------( 276      ( 03 Oct 2018 07:43 )             33.8         HEALTH ISSUES - PROBLEM Dx:  SVT: continue amio load.  No objection to discharge home today.  F/U Dr. Yobani Stephenson in 2 weeks.

## 2018-10-05 NOTE — DISCHARGE NOTE ADULT - MEDICATION SUMMARY - MEDICATIONS TO STOP TAKING
I will STOP taking the medications listed below when I get home from the hospital:    omeprazole 20 mg oral delayed release capsule  -- 1 cap(s) by mouth once a day, As Needed

## 2018-10-05 NOTE — DISCHARGE NOTE ADULT - CARE PROVIDERS DIRECT ADDRESSES
[email or web address]_Unknown

## 2018-10-05 NOTE — DISCHARGE NOTE ADULT - CARE PROVIDER_API CALL
Yobani Stephenson), Cardiology; Internal Medicine  [address] [address]  Phone: [phone]  Fax: [phone]    Anthony Baker), Cardiac Electrophysiology; Cardiology; Internal Medicine  [address] [place] [address]  Phone: [phone]    daryl beach  Phone: (   )    -  Fax: (   )    -

## 2018-10-05 NOTE — DISCHARGE NOTE ADULT - MEDICATION SUMMARY - MEDICATIONS TO TAKE
I will START or STAY ON the medications listed below when I get home from the hospital:    aspirin 81 mg oral delayed release tablet  -- 1 tab(s) by mouth once a day  -- Indication: For Chest pain    irbesartan 150 mg oral tablet  -- 1 tab(s) by mouth once a day  -- Indication: For htn    amiodarone 200 mg oral tablet  -- 2 tab(s) by mouth 2 times a day thru 10/10  -- Indication: For atrial tachycardia    amiodarone 200 mg oral tablet  -- 1 tab(s) by mouth once a day   begin 10/11/18  -- Avoid grapefruit and grapefruit juice while taking this medication.  Avoid prolonged or excessive exposure to direct and/or artificial sunlight while taking this medication.  Do not take this drug if you are pregnant.  It is very important that you take or use this exactly as directed.  Do not skip doses or discontinue unless directed by your doctor.  May cause drowsiness or dizziness.    -- Indication: For atrial tachycardia    metFORMIN 1000 mg oral tablet  -- 1 tab(s) by mouth 2 times a day  -- Indication: For Type 2 diabetes mellitus with hyperglycemia, without long-term current use of insulin    repaglinide 0.5 mg oral tablet  -- 1 tab(s) by mouth 3 times a day (before meals)  -- Indication: For Type 2 diabetes mellitus with hyperglycemia, without long-term current use of insulin    rosuvastatin 20 mg oral tablet  -- 1 tab(s) by mouth once a day (at bedtime)  -- Indication: For hld    metoprolol succinate 25 mg oral tablet, extended release  -- 1 tab(s) by mouth once a day  -- Indication: For htn    donepezil 5 mg oral tablet  -- 1 tab(s) by mouth once a day (at bedtime)  -- Indication: For memory    famotidine 20 mg oral tablet  -- 1 tab(s) by mouth once a day  -- Indication: For Gerd    ferrous sulfate  -- Indication: For anemia    omeprazole 20 mg oral delayed release capsule  -- 1 cap(s) by mouth once a day, As Needed  -- Indication: For Gerd    Yuvafem 10 mcg vaginal tablet  -- 1 tab(s) vaginally 2 times a week  -- Indication: For Gyn     Multiple Vitamins oral tablet  -- 1 tab(s) by mouth once a day  -- Indication: For vitamin    Vitamin B12  -- Indication: For vitamin

## 2018-10-08 ENCOUNTER — INBOUND DOCUMENT (OUTPATIENT)
Age: 83
End: 2018-10-08

## 2018-10-11 RX ORDER — AMIODARONE HYDROCHLORIDE 400 MG/1
1 TABLET ORAL
Qty: 30 | Refills: 0
Start: 2018-10-11 | End: 2018-11-09

## 2018-10-23 ENCOUNTER — APPOINTMENT (OUTPATIENT)
Dept: CARDIOLOGY | Facility: CLINIC | Age: 83
End: 2018-10-23
Payer: MEDICARE

## 2018-10-23 ENCOUNTER — NON-APPOINTMENT (OUTPATIENT)
Age: 83
End: 2018-10-23

## 2018-10-23 VITALS
HEART RATE: 75 BPM | SYSTOLIC BLOOD PRESSURE: 130 MMHG | OXYGEN SATURATION: 96 % | TEMPERATURE: 98.1 F | HEIGHT: 65 IN | DIASTOLIC BLOOD PRESSURE: 70 MMHG | WEIGHT: 129 LBS | BODY MASS INDEX: 21.49 KG/M2

## 2018-10-23 PROCEDURE — 93000 ELECTROCARDIOGRAM COMPLETE: CPT

## 2018-10-23 PROCEDURE — 99214 OFFICE O/P EST MOD 30 MIN: CPT

## 2018-11-08 ENCOUNTER — APPOINTMENT (OUTPATIENT)
Dept: CT IMAGING | Facility: CLINIC | Age: 83
End: 2018-11-08

## 2018-11-08 ENCOUNTER — OUTPATIENT (OUTPATIENT)
Dept: OUTPATIENT SERVICES | Facility: HOSPITAL | Age: 83
LOS: 1 days | End: 2018-11-08
Payer: MEDICARE

## 2018-11-08 DIAGNOSIS — Z00.8 ENCOUNTER FOR OTHER GENERAL EXAMINATION: ICD-10-CM

## 2018-11-08 PROCEDURE — 70480 CT ORBIT/EAR/FOSSA W/O DYE: CPT

## 2018-11-11 PROCEDURE — 85730 THROMBOPLASTIN TIME PARTIAL: CPT

## 2018-11-11 PROCEDURE — 80053 COMPREHEN METABOLIC PANEL: CPT

## 2018-11-11 PROCEDURE — 82962 GLUCOSE BLOOD TEST: CPT

## 2018-11-11 PROCEDURE — 83036 HEMOGLOBIN GLYCOSYLATED A1C: CPT

## 2018-11-11 PROCEDURE — 90686 IIV4 VACC NO PRSV 0.5 ML IM: CPT

## 2018-11-11 PROCEDURE — 85610 PROTHROMBIN TIME: CPT

## 2018-11-11 PROCEDURE — 84484 ASSAY OF TROPONIN QUANT: CPT

## 2018-11-11 PROCEDURE — 85027 COMPLETE CBC AUTOMATED: CPT

## 2018-11-11 PROCEDURE — 84443 ASSAY THYROID STIM HORMONE: CPT

## 2018-11-11 PROCEDURE — 71046 X-RAY EXAM CHEST 2 VIEWS: CPT

## 2018-11-11 PROCEDURE — 93005 ELECTROCARDIOGRAM TRACING: CPT

## 2018-11-11 PROCEDURE — 81001 URINALYSIS AUTO W/SCOPE: CPT

## 2018-11-11 PROCEDURE — 80048 BASIC METABOLIC PNL TOTAL CA: CPT

## 2018-11-11 PROCEDURE — 93306 TTE W/DOPPLER COMPLETE: CPT

## 2018-11-11 PROCEDURE — 82043 UR ALBUMIN QUANTITATIVE: CPT

## 2018-11-11 PROCEDURE — 99285 EMERGENCY DEPT VISIT HI MDM: CPT | Mod: 25

## 2018-12-04 ENCOUNTER — MEDICATION RENEWAL (OUTPATIENT)
Age: 83
End: 2018-12-04

## 2018-12-27 ENCOUNTER — RX RENEWAL (OUTPATIENT)
Age: 83
End: 2018-12-27

## 2019-01-24 ENCOUNTER — NON-APPOINTMENT (OUTPATIENT)
Age: 84
End: 2019-01-24

## 2019-01-24 ENCOUNTER — APPOINTMENT (OUTPATIENT)
Dept: CARDIOLOGY | Facility: CLINIC | Age: 84
End: 2019-01-24
Payer: MEDICARE

## 2019-01-24 VITALS
WEIGHT: 122 LBS | BODY MASS INDEX: 20.33 KG/M2 | DIASTOLIC BLOOD PRESSURE: 76 MMHG | HEIGHT: 65 IN | SYSTOLIC BLOOD PRESSURE: 134 MMHG

## 2019-01-24 VITALS — HEART RATE: 76 BPM

## 2019-01-24 DIAGNOSIS — N39.3 STRESS INCONTINENCE (FEMALE) (MALE): ICD-10-CM

## 2019-01-24 DIAGNOSIS — J06.9 ACUTE UPPER RESPIRATORY INFECTION, UNSPECIFIED: ICD-10-CM

## 2019-01-24 DIAGNOSIS — R00.2 PALPITATIONS: ICD-10-CM

## 2019-01-24 PROCEDURE — 99214 OFFICE O/P EST MOD 30 MIN: CPT

## 2019-01-24 PROCEDURE — 93000 ELECTROCARDIOGRAM COMPLETE: CPT

## 2019-01-24 RX ORDER — BENZONATATE 200 MG/1
200 CAPSULE ORAL
Qty: 20 | Refills: 0 | Status: DISCONTINUED | COMMUNITY
Start: 2017-05-25 | End: 2019-01-24

## 2019-01-24 NOTE — REASON FOR VISIT
[FreeTextEntry1] : The patient is coming in for followup of her hypertension, hypercholesterolemia, mitral regurgitation, palpitations, and diabetes. She denies any chest pains, shortness of breath, or palpitations.\par The patient was hospitalized at [facility] and was discovered to have PAT. She was placed on amiodarone 200 mg daily now. She Was lowered down to 100 mg of amiodarone daily. She complains of occasional lightheadedness. Her neurologist had carotid Doppler which was negative but did show a right thyroid nodule. She will followup with her endocrinologist about this. She also has an unsteady gait. Neurologist to sending her for physical therapy for this.

## 2019-01-24 NOTE — PHYSICAL EXAM
[General Appearance - Well Developed] : well developed [Normal Appearance] : normal appearance [Well Groomed] : well groomed [General Appearance - Well Nourished] : well nourished [No Deformities] : no deformities [General Appearance - In No Acute Distress] : no acute distress [Normal Conjunctiva] : the conjunctiva exhibited no abnormalities [Eyelids - No Xanthelasma] : the eyelids demonstrated no xanthelasmas [Normal Oral Mucosa] : normal oral mucosa [No Oral Pallor] : no oral pallor [No Oral Cyanosis] : no oral cyanosis [Normal Jugular Venous A Waves Present] : normal jugular venous A waves present [Normal Jugular Venous V Waves Present] : normal jugular venous V waves present [No Jugular Venous Bauer A Waves] : no jugular venous bauer A waves [Respiration, Rhythm And Depth] : normal respiratory rhythm and effort [Exaggerated Use Of Accessory Muscles For Inspiration] : no accessory muscle use [Auscultation Breath Sounds / Voice Sounds] : lungs were clear to auscultation bilaterally [Heart Rate And Rhythm] : heart rate and rhythm were normal [Heart Sounds] : normal S1 and S2 [Murmurs] : no murmurs present [Abdomen Soft] : soft [Abdomen Tenderness] : non-tender [Abdomen Mass (___ Cm)] : no abdominal mass palpated [Abnormal Walk] : normal gait [Gait - Sufficient For Exercise Testing] : the gait was sufficient for exercise testing [Nail Clubbing] : no clubbing of the fingernails [Cyanosis, Localized] : no localized cyanosis [Petechial Hemorrhages (___cm)] : no petechial hemorrhages [Skin Color & Pigmentation] : normal skin color and pigmentation [] : no rash [No Venous Stasis] : no venous stasis [Skin Lesions] : no skin lesions [No Skin Ulcers] : no skin ulcer [No Xanthoma] : no  xanthoma was observed [Oriented To Time, Place, And Person] : oriented to person, place, and time [Affect] : the affect was normal [Mood] : the mood was normal [No Anxiety] : not feeling anxious

## 2019-01-24 NOTE — DISCUSSION/SUMMARY
[FreeTextEntry1] : The patient was examined. Her blood pressure was 134/76, and her pulse was 76.Her lungs were clear to auscultation. Cardiac exam was  negative for murmurs rubs or gallops. The  remainder of her  physical exam was  unremarkable. Her EKG showed normal sinus rhythm and poor R. wave progression.The patient should stay on her current medication.She was told to  return in 4 months, or earlier if needed. We'll send her for routine blood tests today. We'll send her for an echocardiogram for chamber size and LV function and rule out valvular heart disease.

## 2019-01-24 NOTE — REVIEW OF SYSTEMS
[Dyspnea on exertion] : dyspnea during exertion [see HPI] : see HPI [Cough] : cough [Joint Pain] : joint pain [Negative] : Heme/Lymph [Shortness Of Breath] : no shortness of breath [Chest Pain] : no chest pain [Palpitations] : no palpitations

## 2019-01-25 ENCOUNTER — RX RENEWAL (OUTPATIENT)
Age: 84
End: 2019-01-25

## 2019-01-28 LAB
ALBUMIN SERPL ELPH-MCNC: 4.2 G/DL
ALP BLD-CCNC: 56 U/L
ALT SERPL-CCNC: 64 U/L
ANION GAP SERPL CALC-SCNC: 13 MMOL/L
AST SERPL-CCNC: 145 U/L
BASOPHILS # BLD AUTO: 0.01 K/UL
BASOPHILS NFR BLD AUTO: 0.1 %
BILIRUB SERPL-MCNC: 0.3 MG/DL
BUN SERPL-MCNC: 31 MG/DL
CALCIUM SERPL-MCNC: 9.8 MG/DL
CHLORIDE SERPL-SCNC: 102 MMOL/L
CHOLEST SERPL-MCNC: 130 MG/DL
CHOLEST/HDLC SERPL: 2.3 RATIO
CO2 SERPL-SCNC: 25 MMOL/L
CREAT SERPL-MCNC: 1.87 MG/DL
EOSINOPHIL # BLD AUTO: 0.17 K/UL
EOSINOPHIL NFR BLD AUTO: 1.8 %
ESTIMATED AVERAGE GLUCOSE: 151 MG/DL
GLUCOSE SERPL-MCNC: 151 MG/DL
HBA1C MFR BLD HPLC: 6.9 %
HCT VFR BLD CALC: 32.4 %
HDLC SERPL-MCNC: 56 MG/DL
HGB BLD-MCNC: 10.4 G/DL
IMM GRANULOCYTES NFR BLD AUTO: 0.1 %
LDLC SERPL CALC-MCNC: 53 MG/DL
LYMPHOCYTES # BLD AUTO: 1.67 K/UL
LYMPHOCYTES NFR BLD AUTO: 17.7 %
MAN DIFF?: NORMAL
MCHC RBC-ENTMCNC: 29.5 PG
MCHC RBC-ENTMCNC: 32.1 GM/DL
MCV RBC AUTO: 92 FL
MONOCYTES # BLD AUTO: 0.68 K/UL
MONOCYTES NFR BLD AUTO: 7.2 %
NEUTROPHILS # BLD AUTO: 6.91 K/UL
NEUTROPHILS NFR BLD AUTO: 73.1 %
PLATELET # BLD AUTO: 299 K/UL
POTASSIUM SERPL-SCNC: 4.7 MMOL/L
PROT SERPL-MCNC: 7.4 G/DL
RBC # BLD: 3.52 M/UL
RBC # FLD: 14.2 %
SODIUM SERPL-SCNC: 140 MMOL/L
T4 SERPL-MCNC: 12.5 UG/DL
TRIGL SERPL-MCNC: 106 MG/DL
TSH SERPL-ACNC: 1.31 UIU/ML
WBC # FLD AUTO: 9.45 K/UL

## 2019-02-07 ENCOUNTER — APPOINTMENT (OUTPATIENT)
Dept: OBGYN | Facility: CLINIC | Age: 84
End: 2019-02-07
Payer: MEDICARE

## 2019-02-07 PROCEDURE — 99213 OFFICE O/P EST LOW 20 MIN: CPT

## 2019-02-11 ENCOUNTER — MEDICATION RENEWAL (OUTPATIENT)
Age: 84
End: 2019-02-11

## 2019-02-20 ENCOUNTER — APPOINTMENT (OUTPATIENT)
Dept: NEPHROLOGY | Facility: CLINIC | Age: 84
End: 2019-02-20
Payer: MEDICARE

## 2019-02-20 VITALS
BODY MASS INDEX: 20.2 KG/M2 | OXYGEN SATURATION: 98 % | SYSTOLIC BLOOD PRESSURE: 109 MMHG | WEIGHT: 121.25 LBS | HEIGHT: 65 IN | DIASTOLIC BLOOD PRESSURE: 56 MMHG | HEART RATE: 68 BPM

## 2019-02-20 VITALS — SYSTOLIC BLOOD PRESSURE: 90 MMHG | DIASTOLIC BLOOD PRESSURE: 60 MMHG

## 2019-02-20 PROCEDURE — 99204 OFFICE O/P NEW MOD 45 MIN: CPT

## 2019-02-20 RX ORDER — FLUTICASONE PROPIONATE 50 UG/1
50 SPRAY, METERED NASAL
Qty: 16 | Refills: 0 | Status: DISCONTINUED | COMMUNITY
Start: 2017-05-25 | End: 2019-02-20

## 2019-02-20 RX ORDER — HYOSCYAMINE SULFATE 0.12 MG/1
0.12 TABLET ORAL
Qty: 120 | Refills: 0 | Status: DISCONTINUED | COMMUNITY
Start: 2017-07-28 | End: 2019-02-20

## 2019-02-20 RX ORDER — DONEPEZIL HYDROCHLORIDE 5 MG/1
5 TABLET ORAL
Qty: 90 | Refills: 0 | Status: DISCONTINUED | COMMUNITY
Start: 2016-09-28 | End: 2019-02-20

## 2019-02-20 RX ORDER — HYOSCYAMINE SULFATE 0.12 MG/1
0.12 TABLET SUBLINGUAL
Qty: 120 | Refills: 0 | Status: DISCONTINUED | COMMUNITY
Start: 2017-08-25 | End: 2019-02-20

## 2019-02-20 RX ORDER — OXYBUTYNIN CHLORIDE 5 MG/1
5 TABLET, EXTENDED RELEASE ORAL DAILY
Qty: 30 | Refills: 3 | Status: DISCONTINUED | COMMUNITY
Start: 2017-11-06 | End: 2019-02-20

## 2019-02-20 RX ORDER — REPAGLINIDE 0.5 MG/1
0.5 TABLET ORAL
Refills: 0 | Status: DISCONTINUED | COMMUNITY
Start: 2019-02-20 | End: 2019-02-20

## 2019-02-21 LAB
BASOPHILS # BLD AUTO: 0.01 K/UL
BASOPHILS NFR BLD AUTO: 0.2 %
EOSINOPHIL # BLD AUTO: 0.23 K/UL
EOSINOPHIL NFR BLD AUTO: 4.3 %
HCT VFR BLD CALC: 31.3 %
HGB BLD-MCNC: 10.2 G/DL
IMM GRANULOCYTES NFR BLD AUTO: 0.4 %
LYMPHOCYTES # BLD AUTO: 1.79 K/UL
LYMPHOCYTES NFR BLD AUTO: 33.3 %
MAN DIFF?: NORMAL
MCHC RBC-ENTMCNC: 30.5 PG
MCHC RBC-ENTMCNC: 32.6 GM/DL
MCV RBC AUTO: 93.7 FL
MONOCYTES # BLD AUTO: 0.54 K/UL
MONOCYTES NFR BLD AUTO: 10 %
NEUTROPHILS # BLD AUTO: 2.79 K/UL
NEUTROPHILS NFR BLD AUTO: 51.8 %
PLATELET # BLD AUTO: 266 K/UL
RBC # BLD: 3.34 M/UL
RBC # FLD: 13.5 %
WBC # FLD AUTO: 5.38 K/UL

## 2019-02-21 NOTE — PHYSICAL EXAM
[General Appearance - Alert] : alert [General Appearance - In No Acute Distress] : in no acute distress [Sclera] : the sclera and conjunctiva were normal [Oropharynx] : the oropharynx was normal [Neck Appearance] : the appearance of the neck was normal [Jugular Venous Distention Increased] : there was no jugular-venous distention [Auscultation Breath Sounds / Voice Sounds] : lungs were clear to auscultation bilaterally [Heart Rate And Rhythm] : heart rate was normal and rhythm regular [Heart Sounds] : normal S1 and S2 [Edema] : there was no peripheral edema [Abdomen Soft] : soft [Abnormal Walk] : normal gait [Musculoskeletal - Swelling] : no joint swelling seen [] : no rash [No Focal Deficits] : no focal deficits [Oriented To Time, Place, And Person] : oriented to person, place, and time

## 2019-02-22 LAB
25(OH)D3 SERPL-MCNC: 41.2 NG/ML
APPEARANCE: CLEAR
BACTERIA: NEGATIVE
BILIRUBIN URINE: NEGATIVE
BLOOD URINE: NEGATIVE
COLOR: YELLOW
GLUCOSE QUALITATIVE U: NEGATIVE
HYALINE CASTS: 7 /LPF
KETONES URINE: NEGATIVE
LEUKOCYTE ESTERASE URINE: ABNORMAL
MICROSCOPIC-UA: NORMAL
NITRITE URINE: NEGATIVE
PH URINE: 5.5
PROTEIN URINE: NORMAL
RED BLOOD CELLS URINE: 2 /HPF
SPECIFIC GRAVITY URINE: 1.02
SQUAMOUS EPITHELIAL CELLS: 2 /HPF
UROBILINOGEN URINE: NORMAL
WHITE BLOOD CELLS URINE: 12 /HPF

## 2019-02-25 LAB
ALBUMIN SERPL ELPH-MCNC: 4.1 G/DL
ANION GAP SERPL CALC-SCNC: 16 MMOL/L
BUN SERPL-MCNC: 18 MG/DL
CALCIUM SERPL-MCNC: 9.2 MG/DL
CALCIUM SERPL-MCNC: 9.2 MG/DL
CHLORIDE SERPL-SCNC: 100 MMOL/L
CO2 SERPL-SCNC: 26 MMOL/L
CREAT SERPL-MCNC: 1.43 MG/DL
CREAT SPEC-SCNC: 125 MG/DL
CREAT/PROT UR: 0.2 RATIO
GLUCOSE SERPL-MCNC: 89 MG/DL
PARATHYROID HORMONE INTACT: 68 PG/ML
PHOSPHATE SERPL-MCNC: 2.4 MG/DL
POTASSIUM SERPL-SCNC: 5.3 MMOL/L
PROT UR-MCNC: 20 MG/DL
SODIUM SERPL-SCNC: 142 MMOL/L

## 2019-02-25 NOTE — HISTORY OF PRESENT ILLNESS
[FreeTextEntry1] : she is 83 year old female with multiple medical problems most notably cardiac disease, hypertension, hypercholesterolemia, mitral regurgitation, palpitations, and diabetes.\par recent diagnosis of PAT. on 100 mg of amiodarone daily. She complains of occasional lightheadedness, especially upon standing. her glucose has been low and despite lowering her Metformin. she also skips the repaglinide often. she had an URI recently and since then her appetite is poor and her BP today is low and orthostatic. her cr had been in the 1.0 range however it is been up to 1.8. she had CT of abdomen last year with few cysts and a lesion ? stone versus mass,.  no swelling. no SOB.

## 2019-02-25 NOTE — ASSESSMENT
[FreeTextEntry1] : worsening renal function SHAZIA versus subacute in the setting of PAT, amiodarone use, ARB and hypotension.. without proteinuria( suppressed?). \par - asked her to stop Avapro and will repeat Cr today... i suspect it will be high still given ongoing hemodynamic challenges.\par - hypoglycemia; weight loss; I asked her to stop Prandin as well until she feels/eats better or if her Glucose is above 160's .\par -weight loss; Renal lesion on previous CT... calcified stone versus complex cyst ;  i gave her Dr. Ortiz Walter's contact info  from  for an evaluation \par RTC in 3 months \par \par labs reviewed with her. phos low... stop ca oscal as it bind phos... also related to decrease appetiote and intake. \par send note to Dr. Stephenson

## 2019-02-25 NOTE — REVIEW OF SYSTEMS
[Feeling Tired] : feeling tired [Recent Weight Loss (___ Lbs)] : recent [unfilled] ~Ulb weight loss [As Noted in HPI] : as noted in HPI [Dizziness] : dizziness [Negative] : Endocrine [Fever] : no fever [Chills] : no chills [Red Eyes] : eyes not red [Loss Of Hearing] : no hearing loss [Lower Ext Edema] : no lower extremity edema [Wheezing] : no wheezing [SOB on Exertion] : no shortness of breath during exertion [Orthopnea] : no orthopnea [Arthralgias] : no arthralgias [Joint Pain] : no joint pain [Itching] : no itching [Confused] : no confusion

## 2019-03-05 ENCOUNTER — APPOINTMENT (OUTPATIENT)
Dept: UROLOGY | Facility: CLINIC | Age: 84
End: 2019-03-05
Payer: MEDICARE

## 2019-03-05 VITALS
RESPIRATION RATE: 16 BRPM | WEIGHT: 120 LBS | TEMPERATURE: 98.4 F | HEART RATE: 68 BPM | HEIGHT: 67 IN | BODY MASS INDEX: 18.83 KG/M2 | SYSTOLIC BLOOD PRESSURE: 125 MMHG | DIASTOLIC BLOOD PRESSURE: 69 MMHG

## 2019-03-05 PROCEDURE — 99203 OFFICE O/P NEW LOW 30 MIN: CPT

## 2019-03-06 NOTE — ASSESSMENT
[FreeTextEntry1] : Imaging reports reviewed.\par Likely minimally complex renal cyst with calcification.\par Recommend renal US (to be done at [facility]).\par Will call with results.

## 2019-03-06 NOTE — HISTORY OF PRESENT ILLNESS
[FreeTextEntry1] : Referred by Dr. Tomlin for complex renal cyst.\par Had CT abdomen 4/11/2018 for non specific abdominal pain and weight loss.\par Recently noted to have intermittent elevated Cr. \par Currently followed by UroGyn for lower urinary tract overactive symptoms. Currently on topical vaginal estrogen.\par \par No gross hematuria or dysuria.\par No flank pain.\par \par ROS reviewed on intake form.

## 2019-03-06 NOTE — PHYSICAL EXAM
[General Appearance - Well Developed] : well developed [General Appearance - Well Nourished] : well nourished [Normal Appearance] : normal appearance [Well Groomed] : well groomed [General Appearance - In No Acute Distress] : no acute distress [Edema] : no peripheral edema [] : no respiratory distress [Respiration, Rhythm And Depth] : normal respiratory rhythm and effort [Exaggerated Use Of Accessory Muscles For Inspiration] : no accessory muscle use [Abdomen Soft] : soft [Abdomen Tenderness] : non-tender [Costovertebral Angle Tenderness] : no ~M costovertebral angle tenderness [Urinary Bladder Findings] : the bladder was normal on palpation

## 2019-03-18 ENCOUNTER — FORM ENCOUNTER (OUTPATIENT)
Age: 84
End: 2019-03-18

## 2019-03-19 ENCOUNTER — OUTPATIENT (OUTPATIENT)
Dept: OUTPATIENT SERVICES | Facility: HOSPITAL | Age: 84
LOS: 1 days | End: 2019-03-19
Payer: MEDICARE

## 2019-03-19 ENCOUNTER — APPOINTMENT (OUTPATIENT)
Dept: ULTRASOUND IMAGING | Facility: CLINIC | Age: 84
End: 2019-03-19
Payer: MEDICARE

## 2019-03-19 DIAGNOSIS — N28.1 CYST OF KIDNEY, ACQUIRED: ICD-10-CM

## 2019-03-19 PROCEDURE — 76775 US EXAM ABDO BACK WALL LIM: CPT | Mod: 26

## 2019-03-19 PROCEDURE — 76775 US EXAM ABDO BACK WALL LIM: CPT

## 2019-04-16 ENCOUNTER — APPOINTMENT (OUTPATIENT)
Dept: CARDIOLOGY | Facility: CLINIC | Age: 84
End: 2019-04-16
Payer: MEDICARE

## 2019-04-16 ENCOUNTER — NON-APPOINTMENT (OUTPATIENT)
Age: 84
End: 2019-04-16

## 2019-04-16 VITALS
DIASTOLIC BLOOD PRESSURE: 54 MMHG | OXYGEN SATURATION: 99 % | SYSTOLIC BLOOD PRESSURE: 130 MMHG | HEIGHT: 67 IN | HEART RATE: 52 BPM | WEIGHT: 118 LBS | BODY MASS INDEX: 18.52 KG/M2

## 2019-04-16 PROCEDURE — 99214 OFFICE O/P EST MOD 30 MIN: CPT

## 2019-04-16 PROCEDURE — 93000 ELECTROCARDIOGRAM COMPLETE: CPT

## 2019-04-16 NOTE — DISCUSSION/SUMMARY
[FreeTextEntry1] : The patient was examined. Her blood pressure was 130/52, and her pulse was 76.Her lungs were clear to auscultation. Cardiac exam was  negative for murmurs rubs or gallops. The  remainder of her  physical exam was  unremarkable. Her EKG showed sinus rhythm and poor R. wave progression.The patient should stay on her current medication.She was told to  return in 2 months, or earlier if needed.

## 2019-04-16 NOTE — REVIEW OF SYSTEMS
[Dyspnea on exertion] : dyspnea during exertion [see HPI] : see HPI [Cough] : cough [Joint Pain] : joint pain [Negative] : Heme/Lymph [Recent Weight Loss (___ Lbs)] : recent [unfilled] ~Ulb weight loss [Shortness Of Breath] : no shortness of breath [Chest Pain] : no chest pain [Palpitations] : no palpitations

## 2019-04-16 NOTE — REASON FOR VISIT
[FreeTextEntry1] : The patient is coming in for followup of her hypertension, hypercholesterolemia, mitral regurgitation, palpitations, and diabetes. She denies any chest pains, shortness of breath, or palpitations.\par The patient was hospitalized at [facility] and was discovered to have PAT. She was placed on amiodarone 200 mg daily now. She Was lowered down to 100 mg of amiodarone daily. She complains of occasional lightheadedness. Her neurologist had carotid Doppler which was negative but did show a right thyroid nodule. She will followup with her endocrinologist about this. She also has an unsteady gait. Neurologist to sending her for physical therapy for this.\par April 16, 2019: The patient is very concerned because she has loss of appetite and has had weight loss. She went to a gastroenterologist who wanted to put her an antidepressant but this idea does not appeal to her in any way. She did see a little blood in her stool which the gastroenterologist does not know about. She has tried some supplements like boost and Ensure.\par

## 2019-04-16 NOTE — PHYSICAL EXAM
[Normal Appearance] : normal appearance [General Appearance - Well Developed] : well developed [General Appearance - Well Nourished] : well nourished [Well Groomed] : well groomed [No Deformities] : no deformities [General Appearance - In No Acute Distress] : no acute distress [Normal Conjunctiva] : the conjunctiva exhibited no abnormalities [Normal Oral Mucosa] : normal oral mucosa [Eyelids - No Xanthelasma] : the eyelids demonstrated no xanthelasmas [No Oral Pallor] : no oral pallor [Normal Jugular Venous A Waves Present] : normal jugular venous A waves present [No Oral Cyanosis] : no oral cyanosis [Normal Jugular Venous V Waves Present] : normal jugular venous V waves present [No Jugular Venous Bauer A Waves] : no jugular venous bauer A waves [Exaggerated Use Of Accessory Muscles For Inspiration] : no accessory muscle use [Respiration, Rhythm And Depth] : normal respiratory rhythm and effort [Auscultation Breath Sounds / Voice Sounds] : lungs were clear to auscultation bilaterally [Heart Rate And Rhythm] : heart rate and rhythm were normal [Murmurs] : no murmurs present [Heart Sounds] : normal S1 and S2 [Abdomen Soft] : soft [Abdomen Tenderness] : non-tender [Abdomen Mass (___ Cm)] : no abdominal mass palpated [Abnormal Walk] : normal gait [Nail Clubbing] : no clubbing of the fingernails [Gait - Sufficient For Exercise Testing] : the gait was sufficient for exercise testing [Petechial Hemorrhages (___cm)] : no petechial hemorrhages [Cyanosis, Localized] : no localized cyanosis [Skin Color & Pigmentation] : normal skin color and pigmentation [] : no rash [No Venous Stasis] : no venous stasis [Skin Lesions] : no skin lesions [No Skin Ulcers] : no skin ulcer [No Xanthoma] : no  xanthoma was observed [Oriented To Time, Place, And Person] : oriented to person, place, and time [Affect] : the affect was normal [Mood] : the mood was normal [No Anxiety] : not feeling anxious

## 2019-04-17 ENCOUNTER — MEDICATION RENEWAL (OUTPATIENT)
Age: 84
End: 2019-04-17

## 2019-04-24 ENCOUNTER — CLINICAL ADVICE (OUTPATIENT)
Age: 84
End: 2019-04-24

## 2019-04-30 ENCOUNTER — APPOINTMENT (OUTPATIENT)
Dept: UROLOGY | Facility: CLINIC | Age: 84
End: 2019-04-30

## 2019-05-07 ENCOUNTER — APPOINTMENT (OUTPATIENT)
Dept: UROLOGY | Facility: CLINIC | Age: 84
End: 2019-05-07

## 2019-05-23 ENCOUNTER — APPOINTMENT (OUTPATIENT)
Dept: CARDIOLOGY | Facility: CLINIC | Age: 84
End: 2019-05-23
Payer: MEDICARE

## 2019-05-23 ENCOUNTER — NON-APPOINTMENT (OUTPATIENT)
Age: 84
End: 2019-05-23

## 2019-05-23 VITALS
HEART RATE: 69 BPM | BODY MASS INDEX: 19.15 KG/M2 | DIASTOLIC BLOOD PRESSURE: 76 MMHG | OXYGEN SATURATION: 98 % | WEIGHT: 122 LBS | HEIGHT: 67 IN | SYSTOLIC BLOOD PRESSURE: 134 MMHG

## 2019-05-23 DIAGNOSIS — H90.3 SENSORINEURAL HEARING LOSS, BILATERAL: ICD-10-CM

## 2019-05-23 PROCEDURE — 93306 TTE W/DOPPLER COMPLETE: CPT

## 2019-05-23 PROCEDURE — 93000 ELECTROCARDIOGRAM COMPLETE: CPT

## 2019-05-23 PROCEDURE — 99214 OFFICE O/P EST MOD 30 MIN: CPT

## 2019-05-23 NOTE — REVIEW OF SYSTEMS
[Recent Weight Loss (___ Lbs)] : recent [unfilled] ~Ulb weight loss [Dyspnea on exertion] : dyspnea during exertion [see HPI] : see HPI [Joint Pain] : joint pain [Negative] : Heme/Lymph [Shortness Of Breath] : no shortness of breath [Chest Pain] : no chest pain [Palpitations] : no palpitations [Cough] : no cough

## 2019-05-23 NOTE — DISCUSSION/SUMMARY
[FreeTextEntry1] : The patient was examined. Her blood pressure was 134/77, and her pulse was 69..Her lungs were clear to auscultation. Cardiac exam was  negative for murmurs rubs or gallops. The  remainder of her  physical exam was  unremarkable. Her EKG showed sinus rhythm and poor R. wave progression.The patient should stay on her current medication.She was told to  return in 4 months, or earlier if needed.

## 2019-05-23 NOTE — REASON FOR VISIT
[FreeTextEntry1] : The patient is coming in for followup of her hypertension, hypercholesterolemia, mitral regurgitation, palpitations, and diabetes. She denies any chest pains, shortness of breath, or palpitations.\par The patient was hospitalized at [facility] and was discovered to have PAT. She was placed on amiodarone 200 mg daily now. She Was lowered down to 100 mg of amiodarone daily. She complains of occasional lightheadedness. Her neurologist had carotid Doppler which was negative but did show a right thyroid nodule. She will followup with her endocrinologist about this. She also has an unsteady gait. Neurologist to sending her for physical therapy for this.\par April 16, 2019: The patient is very concerned because she has loss of appetite and has had weight loss. She went to a gastroenterologist who wanted to put her an antidepressant but this idea does not appeal to her in any way. She did see a little blood in her stool which the gastroenterologist does not know about. She has tried some supplements like boost and Ensure.\par May 23, 2019: The patient is eating better. She is feeling better. She is not taking her Avapro. She is on something to stimulate her appetite which is natural Y. She denies any chest pains, shortness of breath, or palpitations. She had an echocardiogram today which just showed the mitral valve prolapse and no new findings. Ejection fraction was 60%.\par \par

## 2019-06-05 ENCOUNTER — MEDICATION RENEWAL (OUTPATIENT)
Age: 84
End: 2019-06-05

## 2019-06-18 ENCOUNTER — APPOINTMENT (OUTPATIENT)
Dept: CT IMAGING | Facility: CLINIC | Age: 84
End: 2019-06-18
Payer: MEDICARE

## 2019-06-18 ENCOUNTER — APPOINTMENT (OUTPATIENT)
Dept: PULMONOLOGY | Facility: CLINIC | Age: 84
End: 2019-06-18

## 2019-06-18 ENCOUNTER — OUTPATIENT (OUTPATIENT)
Dept: OUTPATIENT SERVICES | Facility: HOSPITAL | Age: 84
LOS: 1 days | End: 2019-06-18
Payer: MEDICARE

## 2019-06-18 DIAGNOSIS — Z00.8 ENCOUNTER FOR OTHER GENERAL EXAMINATION: ICD-10-CM

## 2019-06-18 PROCEDURE — 70480 CT ORBIT/EAR/FOSSA W/O DYE: CPT

## 2019-06-18 PROCEDURE — 70480 CT ORBIT/EAR/FOSSA W/O DYE: CPT | Mod: 26

## 2019-09-19 ENCOUNTER — APPOINTMENT (OUTPATIENT)
Dept: UROLOGY | Facility: CLINIC | Age: 84
End: 2019-09-19
Payer: MEDICARE

## 2019-09-19 ENCOUNTER — NON-APPOINTMENT (OUTPATIENT)
Age: 84
End: 2019-09-19

## 2019-09-19 ENCOUNTER — APPOINTMENT (OUTPATIENT)
Dept: CARDIOLOGY | Facility: CLINIC | Age: 84
End: 2019-09-19
Payer: MEDICARE

## 2019-09-19 VITALS
DIASTOLIC BLOOD PRESSURE: 61 MMHG | OXYGEN SATURATION: 97 % | BODY MASS INDEX: 19.15 KG/M2 | HEIGHT: 67 IN | WEIGHT: 122 LBS | SYSTOLIC BLOOD PRESSURE: 128 MMHG | HEART RATE: 83 BPM

## 2019-09-19 VITALS
TEMPERATURE: 98.2 F | HEART RATE: 71 BPM | DIASTOLIC BLOOD PRESSURE: 66 MMHG | WEIGHT: 122 LBS | SYSTOLIC BLOOD PRESSURE: 133 MMHG | BODY MASS INDEX: 19.15 KG/M2 | HEIGHT: 67 IN

## 2019-09-19 DIAGNOSIS — R26.81 UNSTEADINESS ON FEET: ICD-10-CM

## 2019-09-19 DIAGNOSIS — G81.91 HEMIPLEGIA, UNSPECIFIED AFFECTING RIGHT DOMINANT SIDE: ICD-10-CM

## 2019-09-19 DIAGNOSIS — N20.1 CALCULUS OF URETER: ICD-10-CM

## 2019-09-19 PROCEDURE — 99213 OFFICE O/P EST LOW 20 MIN: CPT

## 2019-09-19 PROCEDURE — 99215 OFFICE O/P EST HI 40 MIN: CPT

## 2019-09-19 PROCEDURE — 93000 ELECTROCARDIOGRAM COMPLETE: CPT

## 2019-09-19 RX ORDER — ESTRADIOL 0.1 MG/G
0.1 CREAM VAGINAL
Qty: 1 | Refills: 3 | Status: DISCONTINUED | COMMUNITY
Start: 2017-05-09 | End: 2019-09-19

## 2019-09-19 RX ORDER — MEGESTROL ACETATE 125 MG/ML
625 SUSPENSION ORAL DAILY
Qty: 1 | Refills: 5 | Status: DISCONTINUED | COMMUNITY
Start: 2019-04-16 | End: 2019-09-19

## 2019-09-19 NOTE — REASON FOR VISIT
[FreeTextEntry1] : The patient is coming in for followup of her hypertension, hypercholesterolemia, mitral regurgitation, palpitations, and diabetes. She denies any chest pains, shortness of breath, or palpitations.\par The patient was hospitalized at [facility] and was discovered to have PAT. She was placed on amiodarone 200 mg daily now. She Was lowered down to 100 mg of amiodarone daily. She complains of occasional lightheadedness. Her neurologist had carotid Doppler which was negative but did show a right thyroid nodule. She will followup with her endocrinologist about this. She also has an unsteady gait. Neurologist to sending her for physical therapy for this.\par April 16, 2019: The patient is very concerned because she has loss of appetite and has had weight loss. She went to a gastroenterologist who wanted to put her an antidepressant but this idea does not appeal to her in any way. She did see a little blood in her stool which the gastroenterologist does not know about. She has tried some supplements like boost and Ensure.\par May 23, 2019: The patient is eating better. She is feeling better. She is not taking her Avapro. She is on something to stimulate her appetite which is natural  She denies any chest pains, shortness of breath, or palpitations. She had an echocardiogram today which just showed the mitral valve prolapse and no new findings. Ejection fraction was 60%.\par September 19, 2019: The patient had severe abdominal pains and vomiting on September 8, 2019.  She was hospitalized at [facility] for 4 days.  They took her off of her diabetes medicines.  They stopped her metformin and repaglinide.  Since coming out of the hospital she is beginning to feel better.  She is still weak on her right side and has trouble walking.  She uses a cane.  She wishes to go for physical therapy.  She has previously broken a couple of bones in her right foot.  She denies any chest pains, shortness of breath, or palpitations.\par \par

## 2019-09-19 NOTE — DISCUSSION/SUMMARY
[FreeTextEntry1] : The patient was examined. Her blood pressure was 128/61, and her pulse was 83..Her lungs were clear to auscultation. Cardiac exam was  negative for murmurs rubs or gallops. The  remainder of her  physical exam was  unremarkable. Her EKG showed sinus rhythm and nonspecific ST and T wave changes, with poor R. wave progression.The patient should stay on her current medication.She was told to  return in 4 months, or earlier if needed.

## 2019-09-30 NOTE — ASSESSMENT
[FreeTextEntry1] : Discussed stone prevention.  Given small size of stone and distal location and lack of symptoms, likely passed stone.  Recommend follow up in three months for renal US to assess renal cyst and confirm no residual hydronephrosis.

## 2019-09-30 NOTE — HISTORY OF PRESENT ILLNESS
[FreeTextEntry1] : Last seen 6 months ago for complex renal cyst.\par Recently discharged from outside hospital for SHAZIA secondary to hydronephrosis secondary to Right UVJ stone.  Managed conservatively and discharged home.  Since then no flank pain, no abdominal pain.  Feels that she has passed the stone.  No gross hematuria, dysuria.  \par \par Outside records reviewed.  CT images reviewed.\par Stone measured 2 mm in size.\par \par No other new complaints.

## 2019-09-30 NOTE — PHYSICAL EXAM
[General Appearance - Well Developed] : well developed [General Appearance - Well Nourished] : well nourished [Normal Appearance] : normal appearance [Well Groomed] : well groomed [General Appearance - In No Acute Distress] : no acute distress [Abdomen Soft] : soft [Abdomen Tenderness] : non-tender [Costovertebral Angle Tenderness] : no ~M costovertebral angle tenderness [Urinary Bladder Findings] : the bladder was normal on palpation [] : no rash [Normal Station and Gait] : the gait and station were normal for the patient's age

## 2019-11-11 ENCOUNTER — MEDICATION RENEWAL (OUTPATIENT)
Age: 84
End: 2019-11-11

## 2019-11-13 ENCOUNTER — MEDICATION RENEWAL (OUTPATIENT)
Age: 84
End: 2019-11-13

## 2019-11-13 RX ORDER — IRBESARTAN 75 MG/1
75 TABLET ORAL
Qty: 90 | Refills: 3 | Status: DISCONTINUED | COMMUNITY
Start: 2019-09-19 | End: 2019-11-13

## 2019-11-15 ENCOUNTER — MEDICATION RENEWAL (OUTPATIENT)
Age: 84
End: 2019-11-15

## 2019-11-20 ENCOUNTER — MOBILE ON CALL (OUTPATIENT)
Age: 84
End: 2019-11-20

## 2019-12-03 ENCOUNTER — APPOINTMENT (OUTPATIENT)
Dept: UROLOGY | Facility: CLINIC | Age: 84
End: 2019-12-03

## 2019-12-11 ENCOUNTER — FORM ENCOUNTER (OUTPATIENT)
Age: 84
End: 2019-12-11

## 2019-12-12 ENCOUNTER — APPOINTMENT (OUTPATIENT)
Dept: OBGYN | Facility: CLINIC | Age: 84
End: 2019-12-12
Payer: MEDICARE

## 2019-12-12 ENCOUNTER — RESULT REVIEW (OUTPATIENT)
Age: 84
End: 2019-12-12

## 2019-12-12 PROCEDURE — G0101: CPT

## 2019-12-17 ENCOUNTER — FORM ENCOUNTER (OUTPATIENT)
Age: 84
End: 2019-12-17

## 2019-12-18 ENCOUNTER — MEDICATION RENEWAL (OUTPATIENT)
Age: 84
End: 2019-12-18

## 2019-12-20 ENCOUNTER — FORM ENCOUNTER (OUTPATIENT)
Age: 84
End: 2019-12-20

## 2019-12-21 ENCOUNTER — APPOINTMENT (OUTPATIENT)
Dept: ULTRASOUND IMAGING | Facility: IMAGING CENTER | Age: 84
End: 2019-12-21
Payer: MEDICARE

## 2019-12-21 ENCOUNTER — OUTPATIENT (OUTPATIENT)
Dept: OUTPATIENT SERVICES | Facility: HOSPITAL | Age: 84
LOS: 1 days | End: 2019-12-21
Payer: MEDICARE

## 2019-12-21 ENCOUNTER — FORM ENCOUNTER (OUTPATIENT)
Age: 84
End: 2019-12-21

## 2019-12-21 DIAGNOSIS — N20.1 CALCULUS OF URETER: ICD-10-CM

## 2019-12-21 PROCEDURE — 76770 US EXAM ABDO BACK WALL COMP: CPT | Mod: 26

## 2019-12-21 PROCEDURE — 76770 US EXAM ABDO BACK WALL COMP: CPT

## 2019-12-23 ENCOUNTER — APPOINTMENT (OUTPATIENT)
Dept: CT IMAGING | Facility: CLINIC | Age: 84
End: 2019-12-23
Payer: MEDICARE

## 2019-12-23 ENCOUNTER — OUTPATIENT (OUTPATIENT)
Dept: OUTPATIENT SERVICES | Facility: HOSPITAL | Age: 84
LOS: 1 days | End: 2019-12-23
Payer: MEDICARE

## 2019-12-23 DIAGNOSIS — Z00.8 ENCOUNTER FOR OTHER GENERAL EXAMINATION: ICD-10-CM

## 2019-12-23 PROCEDURE — 70480 CT ORBIT/EAR/FOSSA W/O DYE: CPT | Mod: 26

## 2019-12-23 PROCEDURE — 70480 CT ORBIT/EAR/FOSSA W/O DYE: CPT

## 2020-01-09 ENCOUNTER — APPOINTMENT (OUTPATIENT)
Dept: UROLOGY | Facility: CLINIC | Age: 85
End: 2020-01-09

## 2020-01-14 ENCOUNTER — APPOINTMENT (OUTPATIENT)
Dept: CARDIOLOGY | Facility: CLINIC | Age: 85
End: 2020-01-14
Payer: MEDICARE

## 2020-01-14 ENCOUNTER — NON-APPOINTMENT (OUTPATIENT)
Age: 85
End: 2020-01-14

## 2020-01-14 VITALS
OXYGEN SATURATION: 97 % | SYSTOLIC BLOOD PRESSURE: 178 MMHG | BODY MASS INDEX: 21.03 KG/M2 | HEART RATE: 63 BPM | DIASTOLIC BLOOD PRESSURE: 66 MMHG | HEIGHT: 67 IN | WEIGHT: 134 LBS

## 2020-01-14 VITALS — DIASTOLIC BLOOD PRESSURE: 68 MMHG | SYSTOLIC BLOOD PRESSURE: 148 MMHG

## 2020-01-14 DIAGNOSIS — R39.198 OTHER DIFFICULTIES WITH MICTURITION: ICD-10-CM

## 2020-01-14 DIAGNOSIS — M51.36 OTHER INTERVERTEBRAL DISC DEGENERATION, LUMBAR REGION: ICD-10-CM

## 2020-01-14 PROCEDURE — 93000 ELECTROCARDIOGRAM COMPLETE: CPT

## 2020-01-14 PROCEDURE — 99214 OFFICE O/P EST MOD 30 MIN: CPT

## 2020-01-14 NOTE — PHYSICAL EXAM
[Normal Appearance] : normal appearance [General Appearance - Well Developed] : well developed [Well Groomed] : well groomed [No Deformities] : no deformities [General Appearance - Well Nourished] : well nourished [Eyelids - No Xanthelasma] : the eyelids demonstrated no xanthelasmas [Normal Conjunctiva] : the conjunctiva exhibited no abnormalities [General Appearance - In No Acute Distress] : no acute distress [No Oral Cyanosis] : no oral cyanosis [Normal Oral Mucosa] : normal oral mucosa [No Oral Pallor] : no oral pallor [Normal Jugular Venous A Waves Present] : normal jugular venous A waves present [No Jugular Venous Bauer A Waves] : no jugular venous bauer A waves [Normal Jugular Venous V Waves Present] : normal jugular venous V waves present [Exaggerated Use Of Accessory Muscles For Inspiration] : no accessory muscle use [Respiration, Rhythm And Depth] : normal respiratory rhythm and effort [Auscultation Breath Sounds / Voice Sounds] : lungs were clear to auscultation bilaterally [Heart Sounds] : normal S1 and S2 [Heart Rate And Rhythm] : heart rate and rhythm were normal [Abdomen Soft] : soft [Murmurs] : no murmurs present [Abdomen Tenderness] : non-tender [Abnormal Walk] : normal gait [Gait - Sufficient For Exercise Testing] : the gait was sufficient for exercise testing [Abdomen Mass (___ Cm)] : no abdominal mass palpated [Cyanosis, Localized] : no localized cyanosis [Petechial Hemorrhages (___cm)] : no petechial hemorrhages [Nail Clubbing] : no clubbing of the fingernails [Skin Color & Pigmentation] : normal skin color and pigmentation [] : no ischemic changes [Skin Lesions] : no skin lesions [No Skin Ulcers] : no skin ulcer [No Venous Stasis] : no venous stasis [Affect] : the affect was normal [No Xanthoma] : no  xanthoma was observed [Oriented To Time, Place, And Person] : oriented to person, place, and time [Mood] : the mood was normal [No Anxiety] : not feeling anxious

## 2020-01-14 NOTE — REASON FOR VISIT
[FreeTextEntry1] : The patient is coming in for followup of her hypertension, hypercholesterolemia, mitral regurgitation, palpitations, and diabetes. She denies any chest pains, shortness of breath, or palpitations.\par The patient was hospitalized at [facility] and was discovered to have PAT. She was placed on amiodarone 200 mg daily now. She Was lowered down to 100 mg of amiodarone daily. She complains of occasional lightheadedness. Her neurologist had carotid Doppler which was negative but did show a right thyroid nodule. She will followup with her endocrinologist about this. She also has an unsteady gait. Neurologist to sending her for physical therapy for this.\par April 16, 2019: The patient is very concerned because she has loss of appetite and has had weight loss. She went to a gastroenterologist who wanted to put her an antidepressant but this idea does not appeal to her in any way. She did see a little blood in her stool which the gastroenterologist does not know about. She has tried some supplements like boost and Ensure.\par May 23, 2019: The patient is eating better. She is feeling better. She is not taking her Avapro. She is on something to stimulate her appetite which is natural  She denies any chest pains, shortness of breath, or palpitations. She had an echocardiogram today which just showed the mitral valve prolapse and no new findings. Ejection fraction was 60%.\par September 19, 2019: The patient had severe abdominal pains and vomiting on September 8, 2019.  She was hospitalized at [facility] for 4 days.  They took her off of her diabetes medicines.  They stopped her metformin and repaglinide.  Since coming out of the hospital she is beginning to feel better.  She is still weak on her right side and has trouble walking.  She uses a cane.  She wishes to go for physical therapy.  She has previously broken a couple of bones in her right foot.  She denies any chest pains, shortness of breath, or palpitations.\par \par January 14, 2020: The patient denies any history of chest pains or shortness of breath.  She only gets occasional palpitations when she gets upset.  She had a growth and she is getting a CAT scan to see if it has come back.  If it does there is that she needs radiation therapy.  She has not had a pneumonia shot in a very long time.  She does not wish to get one today because she is worried it might interfere with the work-up they are doing for of the growth in her right ear.

## 2020-01-14 NOTE — DISCUSSION/SUMMARY
[FreeTextEntry1] : The patient was examined. Her blood pressure was 148/68, and her pulse was 63..Her lungs were clear to auscultation. Cardiac exam was  negative for murmurs rubs or gallops. The  remainder of her  physical exam was  unremarkable. Her EKG showed sinus rhythm and nonspecific ST and T wave changes, with poor R. wave progression.The patient should stay on her current medication.She was told to  return in 4 months, or earlier if needed.

## 2020-01-14 NOTE — REVIEW OF SYSTEMS
[Recent Weight Loss (___ Lbs)] : recent [unfilled] ~Ulb weight loss [Dyspnea on exertion] : dyspnea during exertion [see HPI] : see HPI [Joint Pain] : joint pain [Negative] : Psychiatric [Palpitations] : palpitations [Shortness Of Breath] : no shortness of breath [Chest Pain] : no chest pain [Cough] : no cough

## 2020-01-15 ENCOUNTER — FORM ENCOUNTER (OUTPATIENT)
Age: 85
End: 2020-01-15

## 2020-01-20 ENCOUNTER — FORM ENCOUNTER (OUTPATIENT)
Age: 85
End: 2020-01-20

## 2020-01-21 ENCOUNTER — APPOINTMENT (OUTPATIENT)
Dept: OBGYN | Facility: CLINIC | Age: 85
End: 2020-01-21
Payer: MEDICARE

## 2020-01-21 PROCEDURE — 57420 EXAM OF VAGINA W/SCOPE: CPT

## 2020-02-20 ENCOUNTER — FORM ENCOUNTER (OUTPATIENT)
Age: 85
End: 2020-02-20

## 2020-03-05 ENCOUNTER — NON-APPOINTMENT (OUTPATIENT)
Age: 85
End: 2020-03-05

## 2020-03-05 ENCOUNTER — APPOINTMENT (OUTPATIENT)
Dept: CARDIOLOGY | Facility: CLINIC | Age: 85
End: 2020-03-05
Payer: MEDICARE

## 2020-03-05 VITALS
SYSTOLIC BLOOD PRESSURE: 128 MMHG | BODY MASS INDEX: 21.14 KG/M2 | OXYGEN SATURATION: 99 % | HEART RATE: 60 BPM | WEIGHT: 135 LBS | DIASTOLIC BLOOD PRESSURE: 66 MMHG

## 2020-03-05 DIAGNOSIS — R63.4 ABNORMAL WEIGHT LOSS: ICD-10-CM

## 2020-03-05 PROCEDURE — 99214 OFFICE O/P EST MOD 30 MIN: CPT

## 2020-03-05 PROCEDURE — 93000 ELECTROCARDIOGRAM COMPLETE: CPT | Mod: NC

## 2020-03-05 NOTE — DISCUSSION/SUMMARY
[FreeTextEntry1] : The patient was examined. Her blood pressure was 128/66, and her pulse was 60..Her lungs were clear to auscultation. Cardiac exam was  negative for murmurs rubs or gallops. The  remainder of her  physical exam was  unremarkable. Her EKG showed sinus rhythm and normal QRS complexes.  THe patient should stay on her current medication.She was told to  return in 4 months, or earlier if needed.  The patient is medically and cardiologically cleared for surgery.

## 2020-03-05 NOTE — REVIEW OF SYSTEMS
[Recent Weight Loss (___ Lbs)] : recent [unfilled] ~Ulb weight loss [Dyspnea on exertion] : dyspnea during exertion [Palpitations] : palpitations [see HPI] : see HPI [Joint Pain] : joint pain [Negative] : Heme/Lymph [Shortness Of Breath] : no shortness of breath [Chest Pain] : no chest pain [Cough] : no cough

## 2020-03-06 LAB
ALBUMIN SERPL ELPH-MCNC: 4.2 G/DL
ALP BLD-CCNC: 67 U/L
ALT SERPL-CCNC: 16 U/L
ANION GAP SERPL CALC-SCNC: 14 MMOL/L
AST SERPL-CCNC: 29 U/L
BASOPHILS # BLD AUTO: 0.04 K/UL
BASOPHILS NFR BLD AUTO: 0.5 %
BILIRUB SERPL-MCNC: 0.3 MG/DL
BUN SERPL-MCNC: 28 MG/DL
CALCIUM SERPL-MCNC: 9.4 MG/DL
CHLORIDE SERPL-SCNC: 101 MMOL/L
CHOLEST SERPL-MCNC: 151 MG/DL
CHOLEST/HDLC SERPL: 2.1 RATIO
CO2 SERPL-SCNC: 26 MMOL/L
CREAT SERPL-MCNC: 2.18 MG/DL
EOSINOPHIL # BLD AUTO: 0.19 K/UL
EOSINOPHIL NFR BLD AUTO: 2.5 %
ESTIMATED AVERAGE GLUCOSE: 192 MG/DL
GLUCOSE SERPL-MCNC: 155 MG/DL
HBA1C MFR BLD HPLC: 8.3 %
HCT VFR BLD CALC: 34.4 %
HDLC SERPL-MCNC: 72 MG/DL
HGB BLD-MCNC: 11 G/DL
IMM GRANULOCYTES NFR BLD AUTO: 0.3 %
LDLC SERPL CALC-MCNC: 63 MG/DL
LYMPHOCYTES # BLD AUTO: 1.63 K/UL
LYMPHOCYTES NFR BLD AUTO: 21.6 %
MAN DIFF?: NORMAL
MCHC RBC-ENTMCNC: 30.8 PG
MCHC RBC-ENTMCNC: 32 GM/DL
MCV RBC AUTO: 96.4 FL
MONOCYTES # BLD AUTO: 0.61 K/UL
MONOCYTES NFR BLD AUTO: 8.1 %
NEUTROPHILS # BLD AUTO: 5.07 K/UL
NEUTROPHILS NFR BLD AUTO: 67 %
PLATELET # BLD AUTO: 272 K/UL
POTASSIUM SERPL-SCNC: 5.2 MMOL/L
PROT SERPL-MCNC: 6.7 G/DL
RBC # BLD: 3.57 M/UL
RBC # FLD: 14.8 %
SODIUM SERPL-SCNC: 141 MMOL/L
T4 SERPL-MCNC: 11.7 UG/DL
TRIGL SERPL-MCNC: 77 MG/DL
TSH SERPL-ACNC: 1.56 UIU/ML
WBC # FLD AUTO: 7.56 K/UL

## 2020-05-07 ENCOUNTER — APPOINTMENT (OUTPATIENT)
Dept: CARDIOLOGY | Facility: CLINIC | Age: 85
End: 2020-05-07

## 2020-05-11 ENCOUNTER — RX RENEWAL (OUTPATIENT)
Age: 85
End: 2020-05-11

## 2020-06-15 ENCOUNTER — APPOINTMENT (OUTPATIENT)
Dept: CARDIOLOGY | Facility: CLINIC | Age: 85
End: 2020-06-15
Payer: MEDICARE

## 2020-06-15 ENCOUNTER — NON-APPOINTMENT (OUTPATIENT)
Age: 85
End: 2020-06-15

## 2020-06-15 VITALS
BODY MASS INDEX: 22.4 KG/M2 | WEIGHT: 143 LBS | HEART RATE: 74 BPM | DIASTOLIC BLOOD PRESSURE: 73 MMHG | TEMPERATURE: 97.2 F | SYSTOLIC BLOOD PRESSURE: 153 MMHG | OXYGEN SATURATION: 96 %

## 2020-06-15 PROCEDURE — 99214 OFFICE O/P EST MOD 30 MIN: CPT

## 2020-06-15 PROCEDURE — 93000 ELECTROCARDIOGRAM COMPLETE: CPT | Mod: NC

## 2020-06-15 NOTE — REVIEW OF SYSTEMS
[Recent Weight Loss (___ Lbs)] : recent [unfilled] ~Ulb weight loss [Dyspnea on exertion] : dyspnea during exertion [see HPI] : see HPI [Joint Pain] : joint pain [Negative] : Endocrine [Chest Pain] : no chest pain [Shortness Of Breath] : no shortness of breath [Palpitations] : no palpitations [Cough] : no cough

## 2020-06-15 NOTE — REASON FOR VISIT
[FreeTextEntry1] : The patient comes in for 2 separate reasons.  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  In addition, she needs medical clearance for possible repeat surgery on her ear.  She denies any chest pains, shortness of breath, or palpitations.

## 2020-06-15 NOTE — DISCUSSION/SUMMARY
[FreeTextEntry1] : The patient was examined. Her blood pressure was 153/73, and her pulse was 74..Her lungs were clear to auscultation. Cardiac exam was  negative for murmurs rubs or gallops. The  remainder of her  physical exam was  unremarkable. Her EKG showed sinus rhythm and poor R wave progression in leads V1 and V2.  No acute changes were seen..  THe patient should stay on her current medication.She was told to  return in 4 months, or earlier if needed.  The patient is medically and cardiologically cleared for surgery.

## 2020-06-15 NOTE — PHYSICAL EXAM
[General Appearance - Well Developed] : well developed [Well Groomed] : well groomed [Normal Appearance] : normal appearance [No Deformities] : no deformities [General Appearance - Well Nourished] : well nourished [General Appearance - In No Acute Distress] : no acute distress [Eyelids - No Xanthelasma] : the eyelids demonstrated no xanthelasmas [Normal Conjunctiva] : the conjunctiva exhibited no abnormalities [Normal Oral Mucosa] : normal oral mucosa [No Oral Cyanosis] : no oral cyanosis [No Oral Pallor] : no oral pallor [No Jugular Venous Bauer A Waves] : no jugular venous bauer A waves [Normal Jugular Venous A Waves Present] : normal jugular venous A waves present [Normal Jugular Venous V Waves Present] : normal jugular venous V waves present [Respiration, Rhythm And Depth] : normal respiratory rhythm and effort [Heart Rate And Rhythm] : heart rate and rhythm were normal [Exaggerated Use Of Accessory Muscles For Inspiration] : no accessory muscle use [Auscultation Breath Sounds / Voice Sounds] : lungs were clear to auscultation bilaterally [Abdomen Tenderness] : non-tender [Murmurs] : no murmurs present [Heart Sounds] : normal S1 and S2 [Abdomen Soft] : soft [Abdomen Mass (___ Cm)] : no abdominal mass palpated [Nail Clubbing] : no clubbing of the fingernails [Abnormal Walk] : normal gait [Gait - Sufficient For Exercise Testing] : the gait was sufficient for exercise testing [Cyanosis, Localized] : no localized cyanosis [Petechial Hemorrhages (___cm)] : no petechial hemorrhages [] : no rash [Skin Color & Pigmentation] : normal skin color and pigmentation [No Venous Stasis] : no venous stasis [No Skin Ulcers] : no skin ulcer [No Xanthoma] : no  xanthoma was observed [Skin Lesions] : no skin lesions [Oriented To Time, Place, And Person] : oriented to person, place, and time [Mood] : the mood was normal [Affect] : the affect was normal [No Anxiety] : not feeling anxious

## 2020-06-17 ENCOUNTER — APPOINTMENT (OUTPATIENT)
Dept: CT IMAGING | Facility: CLINIC | Age: 85
End: 2020-06-17
Payer: MEDICARE

## 2020-06-17 ENCOUNTER — OUTPATIENT (OUTPATIENT)
Dept: OUTPATIENT SERVICES | Facility: HOSPITAL | Age: 85
LOS: 1 days | End: 2020-06-17
Payer: MEDICARE

## 2020-06-17 DIAGNOSIS — D44.7 NEOPLASM OF UNCERTAIN BEHAVIOR OF AORTIC BODY AND OTHER PARAGANGLIA: ICD-10-CM

## 2020-06-17 LAB
ALBUMIN SERPL ELPH-MCNC: 4.2 G/DL
ALP BLD-CCNC: 70 U/L
ALT SERPL-CCNC: 15 U/L
ANION GAP SERPL CALC-SCNC: 12 MMOL/L
AST SERPL-CCNC: 23 U/L
BASOPHILS # BLD AUTO: 0.04 K/UL
BASOPHILS NFR BLD AUTO: 0.6 %
BILIRUB SERPL-MCNC: 0.2 MG/DL
BUN SERPL-MCNC: 19 MG/DL
CALCIUM SERPL-MCNC: 9.3 MG/DL
CHLORIDE SERPL-SCNC: 103 MMOL/L
CHOLEST SERPL-MCNC: 155 MG/DL
CHOLEST/HDLC SERPL: 2.1 RATIO
CO2 SERPL-SCNC: 26 MMOL/L
CREAT SERPL-MCNC: 1.45 MG/DL
EOSINOPHIL # BLD AUTO: 0.51 K/UL
EOSINOPHIL NFR BLD AUTO: 7.3 %
ESTIMATED AVERAGE GLUCOSE: 171 MG/DL
GLUCOSE SERPL-MCNC: 211 MG/DL
HBA1C MFR BLD HPLC: 7.6 %
HCT VFR BLD CALC: 33.4 %
HDLC SERPL-MCNC: 74 MG/DL
HGB BLD-MCNC: 10.5 G/DL
IMM GRANULOCYTES NFR BLD AUTO: 0.3 %
LDLC SERPL CALC-MCNC: 63 MG/DL
LYMPHOCYTES # BLD AUTO: 1.6 K/UL
LYMPHOCYTES NFR BLD AUTO: 22.9 %
M TB IFN-G BLD-IMP: NEGATIVE
MAN DIFF?: NORMAL
MCHC RBC-ENTMCNC: 30.5 PG
MCHC RBC-ENTMCNC: 31.4 GM/DL
MCV RBC AUTO: 97.1 FL
MEV IGG FLD QL IA: >300 AU/ML
MEV IGG+IGM SER-IMP: POSITIVE
MONOCYTES # BLD AUTO: 0.63 K/UL
MONOCYTES NFR BLD AUTO: 9 %
NEUTROPHILS # BLD AUTO: 4.19 K/UL
NEUTROPHILS NFR BLD AUTO: 59.9 %
PLATELET # BLD AUTO: 270 K/UL
POTASSIUM SERPL-SCNC: 5.4 MMOL/L
PROT SERPL-MCNC: 6.7 G/DL
QUANTIFERON TB PLUS MITOGEN MINUS NIL: 2.63 IU/ML
QUANTIFERON TB PLUS NIL: 0.01 IU/ML
QUANTIFERON TB PLUS TB1 MINUS NIL: 0.05 IU/ML
QUANTIFERON TB PLUS TB2 MINUS NIL: 0.06 IU/ML
RBC # BLD: 3.44 M/UL
RBC # FLD: 13.2 %
RUBV IGG FLD-ACNC: 20.4 INDEX
RUBV IGG SER-IMP: POSITIVE
SODIUM SERPL-SCNC: 142 MMOL/L
T4 SERPL-MCNC: 9.2 UG/DL
TRIGL SERPL-MCNC: 85 MG/DL
TSH SERPL-ACNC: 1.44 UIU/ML
WBC # FLD AUTO: 6.99 K/UL

## 2020-06-17 PROCEDURE — 70480 CT ORBIT/EAR/FOSSA W/O DYE: CPT | Mod: 26

## 2020-06-17 PROCEDURE — 70480 CT ORBIT/EAR/FOSSA W/O DYE: CPT

## 2020-06-21 ENCOUNTER — RX RENEWAL (OUTPATIENT)
Age: 85
End: 2020-06-21

## 2020-11-08 ENCOUNTER — RX RENEWAL (OUTPATIENT)
Age: 85
End: 2020-11-08

## 2020-11-10 ENCOUNTER — RX RENEWAL (OUTPATIENT)
Age: 85
End: 2020-11-10

## 2020-11-16 ENCOUNTER — FORM ENCOUNTER (OUTPATIENT)
Age: 85
End: 2020-11-16

## 2020-11-17 ENCOUNTER — APPOINTMENT (OUTPATIENT)
Dept: OBGYN | Facility: CLINIC | Age: 85
End: 2020-11-17
Payer: MEDICARE

## 2020-11-17 PROCEDURE — 99213 OFFICE O/P EST LOW 20 MIN: CPT

## 2020-11-21 ENCOUNTER — RX RENEWAL (OUTPATIENT)
Age: 85
End: 2020-11-21

## 2020-11-23 ENCOUNTER — FORM ENCOUNTER (OUTPATIENT)
Age: 85
End: 2020-11-23

## 2020-11-27 ENCOUNTER — APPOINTMENT (OUTPATIENT)
Dept: CT IMAGING | Facility: CLINIC | Age: 85
End: 2020-11-27
Payer: MEDICARE

## 2020-11-27 ENCOUNTER — OUTPATIENT (OUTPATIENT)
Dept: OUTPATIENT SERVICES | Facility: HOSPITAL | Age: 85
LOS: 1 days | End: 2020-11-27
Payer: MEDICARE

## 2020-11-27 DIAGNOSIS — H65.01 ACUTE SEROUS OTITIS MEDIA, RIGHT EAR: ICD-10-CM

## 2020-11-27 PROCEDURE — 70480 CT ORBIT/EAR/FOSSA W/O DYE: CPT

## 2020-11-27 PROCEDURE — 70480 CT ORBIT/EAR/FOSSA W/O DYE: CPT | Mod: 26

## 2020-12-03 NOTE — PROGRESS NOTE ADULT - PROBLEM SELECTOR PLAN 2
See above.   Patient initially adamant against S/S but now agrees to FS and sliding scale>>will continue with metformin but would review with patient's endocrinologist in the AM patient's repaglinide.
- - -

## 2020-12-13 ENCOUNTER — FORM ENCOUNTER (OUTPATIENT)
Age: 85
End: 2020-12-13

## 2020-12-17 NOTE — PATIENT PROFILE ADULT. - FALLEN IN THE PAST
Discharge Instructions: Eating a Low-Potassium Diet  Your healthcare provider has prescribed a low-potassium diet for you. This kind of diet is advised for people who have certain kidney problems. Potassium is needed for muscle function. But too much potassium is a health risk. Potassium is found in many foods. Read below to find out how to change your diet.   Foods to limit  Some foods are high in potassium. Limit your daily intake of the foods in the list below.   · Fruits: apricots (canned and fresh), bananas, cantaloupe, honeydew melon, kiwi, nectarines, pomegranates, oranges, orange juice, pears, dried fruits (apricots, dates, figs, prunes), and prune juice  · Vegetables: asparagus, avocado, artichoke, bamboo shoots, beets, brussels sprouts, cabbage, celery, chard, okra, potatoes (white and sweet), pumpkin, rutabaga, spinach (cooked), squash, tomato, tomato sauce, tomato juice, and vegetable juice cocktail  · Legumes: black-eyed peas, chickpeas, lentils, lima beans, navy beans, red kidney beans, soybeans, and split peas  · Nuts and seeds: almonds, Brazil nuts, cashews, peanuts, peanut butter, pecans, pumpkin seeds, sunflower seeds, and walnuts  · Breads and cereals: bran and whole-grain products  · Dairy foods: milk, cheese, ice cream, yogurt  · Animal protein: all forms of animal protein  · Other: chocolate, cocoa, coconut milk, and molasses  Tips  · Ask your healthcare provider how much potassium you are allowed each day. This will help you figure out serving sizes for your needs.  · Check labels for potassium. It may be listed as potassium chloride.  · Don't use salt substitutes. These often have potassium in them.  · Cook frozen fruits and vegetables in water. Rinse and drain them well before eating.  · Drain liquid from all canned fruits and vegetables. Rinse them before eating.  · Reduce the potassium in potatoes. Peel them, slice thinly, and soak in water for at least 4 hours.  · Reduce the potassium  in green leafy vegetables. Soak them in water for at least 4 hours.  · Eat white rice and refined white flour products. These include white bread, pasta, and grits.    Follow-up  Make a follow-up appointment as advised by your healthcare provider.  When to call your healthcare provider  Call your healthcare provider right away if you have any of the following:  · Tiredness (fatigue)  · Shortness of breath  · Chest pain  · Slow, irregular heartbeat  · Fainting  · Dizziness  · Lightheadedness  · Confusion  · Feeling like your heart is pounding (palpitations)  [email or web address] last reviewed this educational content on 5/1/2020 © 2000-2020 [email or web address]. [address] [place] [address], [place] [address]. All rights reserved. This information is not intended as a substitute for professional medical care. Always follow your healthcare professional's instructions.        Patient Education     Discharge Instructions: Eating a Low-Potassium Diet  Your healthcare provider has prescribed a low-potassium diet for you. This kind of diet is advised for people who have certain kidney problems. Potassium is needed for muscle function. But too much potassium is a health risk. Potassium is found in many foods. Read below to find out how to change your diet.   Foods to limit  Some foods are high in potassium. Limit your daily intake of the foods in the list below.   · Fruits: apricots (canned and fresh), bananas, cantaloupe, honeydew melon, kiwi, nectarines, pomegranates, oranges, orange juice, pears, dried fruits (apricots, dates, figs, prunes), and prune juice  · Vegetables: asparagus, avocado, artichoke, bamboo shoots, beets, brussels sprouts, cabbage, celery, chard, okra, potatoes (white and sweet), pumpkin, rutabaga, spinach (cooked), squash, tomato, tomato sauce, tomato juice, and vegetable juice cocktail  · Legumes: black-eyed peas, chickpeas, lentils, lima beans, navy beans, red kidney beans, soybeans, and split peas  · Nuts and  seeds: almonds, Brazil nuts, cashews, peanuts, peanut butter, pecans, pumpkin seeds, sunflower seeds, and walnuts  · Breads and cereals: bran and whole-grain products  · Dairy foods: milk, cheese, ice cream, yogurt  · Animal protein: all forms of animal protein  · Other: chocolate, cocoa, coconut milk, and molasses  Tips  · Ask your healthcare provider how much potassium you are allowed each day. This will help you figure out serving sizes for your needs.  · Check labels for potassium. It may be listed as potassium chloride.  · Don't use salt substitutes. These often have potassium in them.  · Cook frozen fruits and vegetables in water. Rinse and drain them well before eating.  · Drain liquid from all canned fruits and vegetables. Rinse them before eating.  · Reduce the potassium in potatoes. Peel them, slice thinly, and soak in water for at least 4 hours.  · Reduce the potassium in green leafy vegetables. Soak them in water for at least 4 hours.  · Eat white rice and refined white flour products. These include white bread, pasta, and grits.    Follow-up  Make a follow-up appointment as advised by your healthcare provider.  When to call your healthcare provider  Call your healthcare provider right away if you have any of the following:  · Tiredness (fatigue)  · Shortness of breath  · Chest pain  · Slow, irregular heartbeat  · Fainting  · Dizziness  · Lightheadedness  · Confusion  · Feeling like your heart is pounding (palpitations)  [employer] last reviewed this educational content on 5/1/2020 © 2000-2020 [employer]. [address] [place] [address], [place] [address]. All rights reserved. This information is not intended as a substitute for professional medical care. Always follow your healthcare professional's instructions.            no

## 2021-02-14 ENCOUNTER — TRANSCRIPTION ENCOUNTER (OUTPATIENT)
Age: 86
End: 2021-02-14

## 2021-03-10 ENCOUNTER — NON-APPOINTMENT (OUTPATIENT)
Age: 86
End: 2021-03-10

## 2021-03-10 ENCOUNTER — APPOINTMENT (OUTPATIENT)
Dept: CARDIOLOGY | Facility: CLINIC | Age: 86
End: 2021-03-10
Payer: MEDICARE

## 2021-03-10 VITALS — SYSTOLIC BLOOD PRESSURE: 156 MMHG | DIASTOLIC BLOOD PRESSURE: 82 MMHG

## 2021-03-10 VITALS
BODY MASS INDEX: 21.94 KG/M2 | HEART RATE: 66 BPM | HEIGHT: 67 IN | SYSTOLIC BLOOD PRESSURE: 170 MMHG | DIASTOLIC BLOOD PRESSURE: 74 MMHG | WEIGHT: 139.8 LBS | OXYGEN SATURATION: 98 % | TEMPERATURE: 97 F

## 2021-03-10 DIAGNOSIS — R63.0 ANOREXIA: ICD-10-CM

## 2021-03-10 PROCEDURE — 93000 ELECTROCARDIOGRAM COMPLETE: CPT

## 2021-03-10 PROCEDURE — 99214 OFFICE O/P EST MOD 30 MIN: CPT

## 2021-03-10 NOTE — REASON FOR VISIT
[FreeTextEntry1] : The patient comes in for 2 separate reasons.  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  In addition, she needs medical clearance for possible repeat surgery on her ear.  She denies any chest pains, shortness of breath, or palpitations.\par March 10, 2021: Patient had surgery on her right toes 3 months ago when she is still walking around in a boot for her right foot.  She has been having some pains in her legs and she is worried that she has a deep vein thrombosis.  She denies any swelling.

## 2021-03-10 NOTE — DISCUSSION/SUMMARY
[FreeTextEntry1] : The patient was examined. Her blood pressure was 156/82, and her pulse was 66..Her lungs were clear to auscultation. Cardiac exam was  negative for murmurs rubs or gallops. The  remainder of her  physical exam was  unremarkable. Her EKG showed sinus rhythm and normal QRS complexes.  No acute changes were seen..  THe patient should stay on her current medication.She was told to  return in 4 months, or earlier if needed.  We will send the patient for venous Dopplers because of the surgery and the leg pain to rule out deep vein thrombosis.  Please note, I spent a total of 37 minutes on the date of the encounter evaluating and treating this patient.

## 2021-03-16 ENCOUNTER — OUTPATIENT (OUTPATIENT)
Dept: OUTPATIENT SERVICES | Facility: HOSPITAL | Age: 86
LOS: 1 days | End: 2021-03-16
Payer: MEDICARE

## 2021-03-16 ENCOUNTER — APPOINTMENT (OUTPATIENT)
Dept: ULTRASOUND IMAGING | Facility: CLINIC | Age: 86
End: 2021-03-16
Payer: MEDICARE

## 2021-03-16 DIAGNOSIS — M79.604 PAIN IN RIGHT LEG: ICD-10-CM

## 2021-03-16 PROCEDURE — 93970 EXTREMITY STUDY: CPT

## 2021-03-16 PROCEDURE — 93970 EXTREMITY STUDY: CPT | Mod: 26

## 2021-04-01 ENCOUNTER — APPOINTMENT (OUTPATIENT)
Dept: GASTROENTEROLOGY | Facility: CLINIC | Age: 86
End: 2021-04-01
Payer: MEDICARE

## 2021-04-01 ENCOUNTER — NON-APPOINTMENT (OUTPATIENT)
Age: 86
End: 2021-04-01

## 2021-04-01 VITALS
BODY MASS INDEX: 20.36 KG/M2 | TEMPERATURE: 97.3 F | HEIGHT: 68 IN | WEIGHT: 134.38 LBS | DIASTOLIC BLOOD PRESSURE: 70 MMHG | HEART RATE: 66 BPM | OXYGEN SATURATION: 96 % | SYSTOLIC BLOOD PRESSURE: 118 MMHG

## 2021-04-01 DIAGNOSIS — Z87.19 PERSONAL HISTORY OF OTHER DISEASES OF THE DIGESTIVE SYSTEM: ICD-10-CM

## 2021-04-01 PROCEDURE — 99212 OFFICE O/P EST SF 10 MIN: CPT | Mod: 25

## 2021-04-01 PROCEDURE — 82270 OCCULT BLOOD FECES: CPT

## 2021-04-01 NOTE — REVIEW OF SYSTEMS
[Negative] : Respiratory [FreeTextEntry7] : Change in bowel habit, constipation and fecal impaction. [FreeTextEntry8] : pelvic floor issues

## 2021-04-01 NOTE — PHYSICAL EXAM
[General Appearance - Alert] : alert [Neck Appearance] : the appearance of the neck was normal [] : no respiratory distress [Respiration, Rhythm And Depth] : normal respiratory rhythm and effort [Auscultation Breath Sounds / Voice Sounds] : lungs were clear to auscultation bilaterally [Apical Impulse] : the apical impulse was normal [Heart Rate And Rhythm] : heart rate was normal and rhythm regular [Heart Sounds] : normal S1 and S2 [Bowel Sounds] : normal bowel sounds [Abdomen Soft] : soft [Abdomen Tenderness] : non-tender [No Rectal Mass] : no rectal mass [Occult Blood Positive] : stool was negative for occult blood

## 2021-04-01 NOTE — HISTORY OF PRESENT ILLNESS
[FreeTextEntry1] : This is an 85-year-old female has been followed for a number of years.  He has a history of diverticulosis and has had serial colonoscopies.  Last was 5 years ago and no further screening evaluations are deemed necessary or indicated.  She notices intermittent episodes of constipation which she may need to manually take some stool out of her rectal area.  Stool may be thinner or in little pieces but there is no blood in the stool or on the toilet tissue.  The diabetic but states her numbers are well controlled.  She has no current reflux symptoms although she has been on a proton pump inhibitor in the past.  Voluntarily decreasing her oral intake due to lack of exercise and has lost several pounds.  She sees her  primary care doctor regularly.  She admits to not drinking a lot of water.  Medication list was reviewed.

## 2021-04-01 NOTE — ASSESSMENT
[FreeTextEntry1] : This is an 85-year-old female with several gastroenterological issues.  She has a history of GERD but currently does not require any proton pump inhibitor.  Has no dysphagia but has mild weight loss due to her decreasing her p.o. intake.  Has a history of pelvic floor dysfunction with urinary issues most recently notices a sensation of incomplete evacuation with the stool being caught in her rectal area.  Occasionally she needs to disimpact the self with toilet tissue or her finger.  There is no blood in the stool or on the toilet tissue.  She notices a vague periumbilical discomfort which is usually relieved with bowel movements.  I feel that the patient is having bowel hypomotility coupled with decreased water intake and activity her medications as well as pelvic floor issues.  Was told to start MiraLAX since her stool was harder to use it every day to every other day.  May benefit from a medication such as Linzess in the future.  Hesitant at the present time since she does have a history of diverticulosis with spasm and it could theoretically worsen cramping.  Had multiple colonoscopies in the past for evaluation of a pancreas her most recent less than 3 years ago and it was normal.  Not feel we need to repeat any invasive testing or imaging at the present time unless his symptoms progress or her weight loss becomes more marked.

## 2021-05-18 ENCOUNTER — NON-APPOINTMENT (OUTPATIENT)
Age: 86
End: 2021-05-18

## 2021-05-18 ENCOUNTER — APPOINTMENT (OUTPATIENT)
Dept: CARDIOLOGY | Facility: CLINIC | Age: 86
End: 2021-05-18
Payer: COMMERCIAL

## 2021-05-18 VITALS
SYSTOLIC BLOOD PRESSURE: 104 MMHG | RESPIRATION RATE: 17 BRPM | BODY MASS INDEX: 20.22 KG/M2 | WEIGHT: 133 LBS | TEMPERATURE: 98.1 F | HEART RATE: 61 BPM | OXYGEN SATURATION: 98 % | DIASTOLIC BLOOD PRESSURE: 62 MMHG

## 2021-05-18 PROCEDURE — 99214 OFFICE O/P EST MOD 30 MIN: CPT

## 2021-05-18 PROCEDURE — 93000 ELECTROCARDIOGRAM COMPLETE: CPT

## 2021-05-18 NOTE — REVIEW OF SYSTEMS
[Negative] : Heme/Lymph [Fever] : no fever [SOB] : no shortness of breath [Dyspnea on exertion] : not dyspnea during exertion [Chest Discomfort] : no chest discomfort [Lower Ext Edema] : no extremity edema [Leg Claudication] : no intermittent leg claudication [Palpitations] : no palpitations [Orthopnea] : no orthopnea [PND] : no PND [Syncope] : no syncope [Coughing Up Blood] : no hemoptysis

## 2021-05-18 NOTE — PHYSICAL EXAM
[General Appearance - Well Developed] : well developed [Normal Appearance] : normal appearance [Well Groomed] : well groomed [General Appearance - Well Nourished] : well nourished [No Deformities] : no deformities [General Appearance - In No Acute Distress] : no acute distress [Normal Conjunctiva] : the conjunctiva exhibited no abnormalities [Eyelids - No Xanthelasma] : the eyelids demonstrated no xanthelasmas [Normal Oral Mucosa] : normal oral mucosa [No Oral Pallor] : no oral pallor [No Oral Cyanosis] : no oral cyanosis [Normal Jugular Venous A Waves Present] : normal jugular venous A waves present [Normal Jugular Venous V Waves Present] : normal jugular venous V waves present [No Jugular Venous Bauer A Waves] : no jugular venous bauer A waves [Respiration, Rhythm And Depth] : normal respiratory rhythm and effort [Exaggerated Use Of Accessory Muscles For Inspiration] : no accessory muscle use [Auscultation Breath Sounds / Voice Sounds] : lungs were clear to auscultation bilaterally [Heart Rate And Rhythm] : heart rate and rhythm were normal [Heart Sounds] : normal S1 and S2 [Murmurs] : no murmurs present [Abdomen Soft] : soft [Abdomen Tenderness] : non-tender [Abdomen Mass (___ Cm)] : no abdominal mass palpated [Abnormal Walk] : normal gait [Gait - Sufficient For Exercise Testing] : the gait was sufficient for exercise testing [Nail Clubbing] : no clubbing of the fingernails [Cyanosis, Localized] : no localized cyanosis [Petechial Hemorrhages (___cm)] : no petechial hemorrhages [Skin Color & Pigmentation] : normal skin color and pigmentation [No Venous Stasis] : no venous stasis [] : no rash [Skin Lesions] : no skin lesions [No Skin Ulcers] : no skin ulcer [No Xanthoma] : no  xanthoma was observed [Oriented To Time, Place, And Person] : oriented to person, place, and time [Affect] : the affect was normal [Mood] : the mood was normal [No Anxiety] : not feeling anxious

## 2021-05-18 NOTE — REASON FOR VISIT
[FreeTextEntry1] : The patient comes in for 2 separate reasons.  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  In addition, she needs medical clearance for possible repeat surgery on her ear.  She denies any chest pains, shortness of breath, or palpitations.\par March 10, 2021: Patient had surgery on her right toes 3 months ago when she is still walking around in a boot for her right foot.  She has been having some pains in her legs and she is worried that she has a deep vein thrombosis.  She denies any swelling.\par May 18, 2021: The patient has no new complaints.  She denies any chest pains, shortness of breath, or palpitations.  She needs forms filled out for her employment.  She also needs a PPD planted.

## 2021-05-18 NOTE — DISCUSSION/SUMMARY
[FreeTextEntry1] : The patient was examined. Her blood pressure was 104/62, and her pulse was 61..Her lungs were clear to auscultation. Cardiac exam was  negative for murmurs rubs or gallops. The  remainder of her  physical exam was  unremarkable. Her EKG showed sinus rhythm and poor R wave progression..  No acute changes were seen..  THe patient should stay on her current medication.She was told to  return in 4 months, or earlier if needed. Please note, I spent a total of 36 minutes on the date of the encounter evaluating and treating this patient.  A PPD was planted.  Forms were filled out.

## 2021-05-19 ENCOUNTER — RX RENEWAL (OUTPATIENT)
Age: 86
End: 2021-05-19

## 2021-05-29 ENCOUNTER — RX RENEWAL (OUTPATIENT)
Age: 86
End: 2021-05-29

## 2021-07-10 ENCOUNTER — NON-APPOINTMENT (OUTPATIENT)
Age: 86
End: 2021-07-10

## 2021-08-18 ENCOUNTER — NON-APPOINTMENT (OUTPATIENT)
Age: 86
End: 2021-08-18

## 2021-08-18 ENCOUNTER — APPOINTMENT (OUTPATIENT)
Dept: CARDIOLOGY | Facility: CLINIC | Age: 86
End: 2021-08-18
Payer: MEDICARE

## 2021-08-18 VITALS
BODY MASS INDEX: 20.92 KG/M2 | DIASTOLIC BLOOD PRESSURE: 72 MMHG | WEIGHT: 138 LBS | OXYGEN SATURATION: 97 % | HEART RATE: 62 BPM | SYSTOLIC BLOOD PRESSURE: 151 MMHG | HEIGHT: 68 IN

## 2021-08-18 PROCEDURE — 99214 OFFICE O/P EST MOD 30 MIN: CPT

## 2021-08-18 PROCEDURE — 93000 ELECTROCARDIOGRAM COMPLETE: CPT

## 2021-08-18 RX ORDER — CANDESARTAN CILEXETIL 16 MG/1
16 TABLET ORAL DAILY
Qty: 30 | Refills: 3 | Status: DISCONTINUED | COMMUNITY
Start: 2019-11-21 | End: 2021-08-18

## 2021-08-18 NOTE — DISCUSSION/SUMMARY
[FreeTextEntry1] : The patient was examined. Her blood pressure was 151/72, and her pulse was 62..Her lungs were clear to auscultation. Cardiac exam was  negative for murmurs rubs or gallops. The  remainder of her  physical exam was  unremarkable. Her EKG showed sinus rhythm and poor R wave progression..  No acute changes were seen..  The patient should stay on her current medication.She was told to  return in 4 months, or earlier if needed.  Total time spent on the day of the encounter was  35 minutes which includes  face-to-face and non face-to-face times personally spent by the physician preparing to see the patient, obtaining  separately obtained history, performing a medically appropriate exam and evaluation, counseling, educating, talking to the family or caregivers, ordering medicines, ordering tests or procedures, referring and communicating with other healthcare foods professionals, and documenting clinical information in the electronic health record.

## 2021-08-18 NOTE — PHYSICAL EXAM
[General Appearance - Well Developed] : well developed [Normal Appearance] : normal appearance [Well Groomed] : well groomed [General Appearance - Well Nourished] : well nourished [No Deformities] : no deformities [General Appearance - In No Acute Distress] : no acute distress [Normal Conjunctiva] : the conjunctiva exhibited no abnormalities [Eyelids - No Xanthelasma] : the eyelids demonstrated no xanthelasmas [Normal Oral Mucosa] : normal oral mucosa [No Oral Pallor] : no oral pallor [No Oral Cyanosis] : no oral cyanosis [Normal Jugular Venous A Waves Present] : normal jugular venous A waves present [Normal Jugular Venous V Waves Present] : normal jugular venous V waves present [No Jugular Venous Bauer A Waves] : no jugular venous bauer A waves [Respiration, Rhythm And Depth] : normal respiratory rhythm and effort [Exaggerated Use Of Accessory Muscles For Inspiration] : no accessory muscle use [Auscultation Breath Sounds / Voice Sounds] : lungs were clear to auscultation bilaterally [Heart Rate And Rhythm] : heart rate and rhythm were normal [Heart Sounds] : normal S1 and S2 [Murmurs] : no murmurs present [Abdomen Soft] : soft [Abdomen Tenderness] : non-tender [Abdomen Mass (___ Cm)] : no abdominal mass palpated [Abnormal Walk] : normal gait [Gait - Sufficient For Exercise Testing] : the gait was sufficient for exercise testing [Nail Clubbing] : no clubbing of the fingernails [Cyanosis, Localized] : no localized cyanosis [Petechial Hemorrhages (___cm)] : no petechial hemorrhages [Skin Color & Pigmentation] : normal skin color and pigmentation [] : no rash [No Venous Stasis] : no venous stasis [Skin Lesions] : no skin lesions [No Skin Ulcers] : no skin ulcer [No Xanthoma] : no  xanthoma was observed [Oriented To Time, Place, And Person] : oriented to person, place, and time [Mood] : the mood was normal [Affect] : the affect was normal [No Anxiety] : not feeling anxious

## 2021-08-18 NOTE — REASON FOR VISIT
[FreeTextEntry1] : The patient comes in for 2 separate reasons.  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  In addition, she needs medical clearance for possible repeat surgery on her ear.  She denies any chest pains, shortness of breath, or palpitations.\par March 10, 2021: Patient had surgery on her right toes 3 months ago when she is still walking around in a boot for her right foot.  She has been having some pains in her legs and she is worried that she has a deep vein thrombosis.  She denies any swelling.\par May 18, 2021: The patient has no new complaints.  She denies any chest pains, shortness of breath, or palpitations.  She needs forms filled out for her employment.  She also needs a PPD planted.\par August 18, 2021: The patient has no new complaints.  She denies any chest pains, palpitations, or shortness of breath.\par

## 2021-10-05 ENCOUNTER — OUTPATIENT (OUTPATIENT)
Dept: OUTPATIENT SERVICES | Facility: HOSPITAL | Age: 86
LOS: 1 days | End: 2021-10-05
Payer: MEDICARE

## 2021-10-05 ENCOUNTER — APPOINTMENT (OUTPATIENT)
Dept: CT IMAGING | Facility: CLINIC | Age: 86
End: 2021-10-05
Payer: MEDICARE

## 2021-10-05 DIAGNOSIS — F44.7 CONVERSION DISORDER WITH MIXED SYMPTOM PRESENTATION: ICD-10-CM

## 2021-10-05 PROCEDURE — 70480 CT ORBIT/EAR/FOSSA W/O DYE: CPT | Mod: MH

## 2021-10-05 PROCEDURE — 70480 CT ORBIT/EAR/FOSSA W/O DYE: CPT | Mod: 26,MH

## 2021-10-11 ENCOUNTER — RX RENEWAL (OUTPATIENT)
Age: 86
End: 2021-10-11

## 2021-12-02 DIAGNOSIS — R92.2 INCONCLUSIVE MAMMOGRAM: ICD-10-CM

## 2021-12-15 ENCOUNTER — NON-APPOINTMENT (OUTPATIENT)
Age: 86
End: 2021-12-15

## 2021-12-15 ENCOUNTER — APPOINTMENT (OUTPATIENT)
Dept: CARDIOLOGY | Facility: CLINIC | Age: 86
End: 2021-12-15
Payer: MEDICARE

## 2021-12-15 VITALS
OXYGEN SATURATION: 97 % | BODY MASS INDEX: 20.76 KG/M2 | WEIGHT: 137 LBS | SYSTOLIC BLOOD PRESSURE: 132 MMHG | DIASTOLIC BLOOD PRESSURE: 70 MMHG | HEART RATE: 60 BPM | HEIGHT: 68 IN

## 2021-12-15 PROCEDURE — 93000 ELECTROCARDIOGRAM COMPLETE: CPT

## 2021-12-15 PROCEDURE — 99214 OFFICE O/P EST MOD 30 MIN: CPT

## 2021-12-15 RX ORDER — OMEPRAZOLE 20 MG/1
20 CAPSULE, DELAYED RELEASE ORAL DAILY
Qty: 90 | Refills: 1 | Status: DISCONTINUED | COMMUNITY
Start: 2017-01-19 | End: 2021-12-15

## 2021-12-15 NOTE — DISCUSSION/SUMMARY
[FreeTextEntry1] : The patient was examined. Her blood pressure was 132/70 and her pulse was 60..Her lungs were clear to auscultation. Cardiac exam was  negative for murmurs rubs or gallops. The  remainder of her  physical exam was  unremarkable. Her EKG showed sinus rhythm and no acute changes.  The patient should stay on her current medication.She was told to  return in 4 months, or earlier if needed.  Total time spent on the day of the encounter was 34 minutes which includes  face-to-face and non face-to-face times personally spent by the physician preparing to see the patient, obtaining  separately obtained history, performing a medically appropriate exam and evaluation, counseling, educating, talking to the family or caregivers, ordering medicines, ordering tests or procedures, referring and communicating with other healthcare professionals, and documenting clinical information in the electronic health record.

## 2021-12-15 NOTE — REASON FOR VISIT
[FreeTextEntry1] : The patient comes in for 2 separate reasons.  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  In addition, she needs medical clearance for possible repeat surgery on her ear.  She denies any chest pains, shortness of breath, or palpitations.\par March 10, 2021: Patient had surgery on her right toes 3 months ago when she is still walking around in a boot for her right foot.  She has been having some pains in her legs and she is worried that she has a deep vein thrombosis.  She denies any swelling.\par May 18, 2021: The patient has no new complaints.  She denies any chest pains, shortness of breath, or palpitations.  She needs forms filled out for her employment.  She also needs a PPD planted.\par August 18, 2021: The patient has no new complaints.  She denies any chest pains, palpitations, or shortness of breath.\par December 15, 2021: Patient is under stress.  She gets pains in the back of her head.  She does go to her neurologist.  She denies any chest pains, shortness of breath, or palpitations.

## 2021-12-23 ENCOUNTER — RX RENEWAL (OUTPATIENT)
Age: 86
End: 2021-12-23

## 2021-12-23 ENCOUNTER — APPOINTMENT (OUTPATIENT)
Dept: GASTROENTEROLOGY | Facility: CLINIC | Age: 86
End: 2021-12-23
Payer: MEDICARE

## 2021-12-23 VITALS
WEIGHT: 134.38 LBS | SYSTOLIC BLOOD PRESSURE: 120 MMHG | DIASTOLIC BLOOD PRESSURE: 76 MMHG | TEMPERATURE: 97.3 F | HEART RATE: 70 BPM | OXYGEN SATURATION: 97 % | BODY MASS INDEX: 20.84 KG/M2 | HEIGHT: 67.5 IN

## 2021-12-23 DIAGNOSIS — Z87.19 PERSONAL HISTORY OF OTHER DISEASES OF THE DIGESTIVE SYSTEM: ICD-10-CM

## 2021-12-23 DIAGNOSIS — Z87.39 PERSONAL HISTORY OF OTHER DISEASES OF THE MUSCULOSKELETAL SYSTEM AND CONNECTIVE TISSUE: ICD-10-CM

## 2021-12-23 DIAGNOSIS — Z86.39 PERSONAL HISTORY OF OTHER ENDOCRINE, NUTRITIONAL AND METABOLIC DISEASE: ICD-10-CM

## 2021-12-23 PROCEDURE — 99213 OFFICE O/P EST LOW 20 MIN: CPT

## 2021-12-23 RX ORDER — OMEPRAZOLE 20 MG/1
20 CAPSULE, DELAYED RELEASE ORAL DAILY
Qty: 30 | Refills: 3 | Status: COMPLETED | COMMUNITY
Start: 2021-12-23 | End: 2022-04-22

## 2021-12-23 NOTE — REVIEW OF SYSTEMS
[As Noted in HPI] : as noted in HPI [Constipation] : constipation [Heartburn] : heartburn [Negative] : Respiratory

## 2021-12-23 NOTE — HISTORY OF PRESENT ILLNESS
[FreeTextEntry1] : I saw your patient Subha Wise in the office today.  The patient is an 86-year-old female who has a history of diabetes, valvular heart disease and a history of hyperlipidemia.  From the gastrointestinal perspective the patient been followed over the years for colorectal cancer screening and is up-to-date in this regard.  Over the years she has had serial imaging of the abdomen due to the presence of a pancreatic cyst.  Her most recent imaging was quite favorable and no further surveillance was deemed necessary.  The patient has had no Covid issues and denies a history of any unexplained weight loss.  The patient notices some epigastric discomfort with no regurgitation or dysphagia.  She is not taking her proton pump inhibitor.  The patient's bowel movements are occasionally sluggish with the sensation of the stool remaining in her rectal area.  Patient has been diagnosed with pelvic floor dysfunction which accounts for these symptoms.  Technically she does not skip days but feels she may have an incomplete evacuation.  The patient does not abuse tobacco caffeine or ethanol.

## 2021-12-23 NOTE — ASSESSMENT
[FreeTextEntry1] : The patient is an 86-year-old female with several medical issues.  The patient notices some mild epigastric discomfort with no red flag signs such as dysphagia or unexplained weight loss.  The patient has no rectal bleeding.  The most likely diagnosis is that of gastritis.  He has been on proton pump inhibitors in the past and I asked the patient to resume omeprazole at 20 mg once a day.  The patient has had multiple imaging studies in the past but I did state if the symptoms persist we may need to consider repeat sonogram and/or a CAT scan.  The most recent scan as follow-up of the pancreatic cyst actually revealed that there was no significant pathology.  The patient has known pelvic floor dysfunction and will continue her current bowel regimen.  The patient will get back to me with a progress report in several weeks.

## 2022-01-20 ENCOUNTER — APPOINTMENT (OUTPATIENT)
Dept: UROLOGY | Facility: CLINIC | Age: 87
End: 2022-01-20
Payer: MEDICARE

## 2022-01-20 ENCOUNTER — OUTPATIENT (OUTPATIENT)
Dept: OUTPATIENT SERVICES | Facility: HOSPITAL | Age: 87
LOS: 1 days | End: 2022-01-20
Payer: MEDICARE

## 2022-01-20 VITALS
BODY MASS INDEX: 21.03 KG/M2 | HEIGHT: 67 IN | RESPIRATION RATE: 17 BRPM | WEIGHT: 134 LBS | DIASTOLIC BLOOD PRESSURE: 67 MMHG | HEART RATE: 58 BPM | SYSTOLIC BLOOD PRESSURE: 134 MMHG

## 2022-01-20 DIAGNOSIS — N28.1 CYST OF KIDNEY, ACQUIRED: ICD-10-CM

## 2022-01-20 PROCEDURE — 76775 US EXAM ABDO BACK WALL LIM: CPT

## 2022-01-20 PROCEDURE — 76775 US EXAM ABDO BACK WALL LIM: CPT | Mod: 26

## 2022-01-20 PROCEDURE — 99213 OFFICE O/P EST LOW 20 MIN: CPT | Mod: 25

## 2022-01-20 NOTE — HISTORY OF PRESENT ILLNESS
[FreeTextEntry1] : Here for follow-up.  Was last seen in the office about 2 years ago.  Has history of complex renal cyst, has been followed by office renal ultrasound.  Her last visit no flank pain or gross hematuria.  No recent stone passage.  Has baseline overactive bladder symptoms including frequency of urination.  Nocturia 0-1 per night.  No dysuria or other signs symptoms of urinary tract infection.  Occasional urge incontinence.

## 2022-01-20 NOTE — ASSESSMENT
[FreeTextEntry1] : Office renal stone performed today in the office.  Images were reviewed.  No suspicious findings noted.  Has bilateral minimally complex renal cyst.  \par \par All findings reviewed with patient.  Recommend follow-up in 1 year for office renal ultrasound.

## 2022-01-21 DIAGNOSIS — R35.0 FREQUENCY OF MICTURITION: ICD-10-CM

## 2022-04-15 ENCOUNTER — APPOINTMENT (OUTPATIENT)
Dept: CT IMAGING | Facility: CLINIC | Age: 87
End: 2022-04-15
Payer: MEDICARE

## 2022-04-15 ENCOUNTER — OUTPATIENT (OUTPATIENT)
Dept: OUTPATIENT SERVICES | Facility: HOSPITAL | Age: 87
LOS: 1 days | End: 2022-04-15
Payer: MEDICARE

## 2022-04-15 DIAGNOSIS — D44.7 NEOPLASM OF UNCERTAIN BEHAVIOR OF AORTIC BODY AND OTHER PARAGANGLIA: ICD-10-CM

## 2022-04-15 PROCEDURE — 70480 CT ORBIT/EAR/FOSSA W/O DYE: CPT | Mod: MH

## 2022-04-15 PROCEDURE — 70480 CT ORBIT/EAR/FOSSA W/O DYE: CPT | Mod: 26,MH

## 2022-04-20 ENCOUNTER — APPOINTMENT (OUTPATIENT)
Dept: CARDIOLOGY | Facility: CLINIC | Age: 87
End: 2022-04-20
Payer: MEDICARE

## 2022-04-20 ENCOUNTER — NON-APPOINTMENT (OUTPATIENT)
Age: 87
End: 2022-04-20

## 2022-04-20 VITALS
HEART RATE: 71 BPM | DIASTOLIC BLOOD PRESSURE: 70 MMHG | SYSTOLIC BLOOD PRESSURE: 151 MMHG | OXYGEN SATURATION: 96 % | WEIGHT: 133 LBS | BODY MASS INDEX: 20.83 KG/M2

## 2022-04-20 DIAGNOSIS — Z02.1 ENCOUNTER FOR PRE-EMPLOYMENT EXAMINATION: ICD-10-CM

## 2022-04-20 DIAGNOSIS — Z01.810 ENCOUNTER FOR PREPROCEDURAL CARDIOVASCULAR EXAMINATION: ICD-10-CM

## 2022-04-20 DIAGNOSIS — Z12.39 ENCOUNTER FOR OTHER SCREENING FOR MALIGNANT NEOPLASM OF BREAST: ICD-10-CM

## 2022-04-20 DIAGNOSIS — M79.604 PAIN IN RIGHT LEG: ICD-10-CM

## 2022-04-20 PROCEDURE — 93000 ELECTROCARDIOGRAM COMPLETE: CPT

## 2022-04-20 PROCEDURE — 99214 OFFICE O/P EST MOD 30 MIN: CPT

## 2022-04-20 NOTE — REASON FOR VISIT
[FreeTextEntry1] : The patient comes in for 2 separate reasons.  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  In addition, she needs medical clearance for possible repeat surgery on her ear.  She denies any chest pains, shortness of breath, or palpitations.\par March 10, 2021: Patient had surgery on her right toes 3 months ago when she is still walking around in a boot for her right foot.  She has been having some pains in her legs and she is worried that she has a deep vein thrombosis.  She denies any swelling.\par May 18, 2021: The patient has no new complaints.  She denies any chest pains, shortness of breath, or palpitations.  She needs forms filled out for her employment.  She also needs a PPD planted.\par August 18, 2021: The patient has no new complaints.  She denies any chest pains, palpitations, or shortness of breath.\par December 15, 2021: Patient is under stress.  She gets pains in the back of her head.  She does go to her neurologist.  She denies any chest pains, shortness of breath, or palpitations.\par April 20, 2022: The patient needs a form filled out because she takes care of her grandchild.  She needs titers for immunity to rubella and rubeola and colloidal gold titer to make sure she does not have tuberculosis.  She had an episode of low back pain and went to her orthopedist who gave her steroid injection and she is feeling better.

## 2022-04-20 NOTE — DISCUSSION/SUMMARY
[FreeTextEntry1] : The patient was examined. Her blood pressure was 151/70 and her pulse was 71..Her lungs were clear to auscultation. Cardiac exam was  negative for murmurs rubs or gallops. The  remainder of her  physical exam was  unremarkable. Her EKG showed sinus rhythm and no acute changes.  The patient should stay on her current medication.She was told to  return in 4 months, or earlier if needed.  Total time spent on the day of the encounter was 36 minutes which includes  face-to-face and non face-to-face times personally spent by the physician preparing to see the patient, obtaining  separately obtained history, performing a medically appropriate exam and evaluation, counseling, educating, talking to the family or caregivers, ordering medicines, ordering tests or procedures, referring and communicating with other healthcare professionals, and documenting clinical information in the electronic health record.

## 2022-04-21 LAB
MEV IGG FLD QL IA: >300 AU/ML
MEV IGG+IGM SER-IMP: POSITIVE
RUBV IGG FLD-ACNC: 23.8 INDEX
RUBV IGG SER-IMP: POSITIVE

## 2022-04-25 LAB
M TB IFN-G BLD-IMP: NEGATIVE
QUANTIFERON TB PLUS MITOGEN MINUS NIL: 1.93 IU/ML
QUANTIFERON TB PLUS NIL: 0 IU/ML
QUANTIFERON TB PLUS TB1 MINUS NIL: 0 IU/ML
QUANTIFERON TB PLUS TB2 MINUS NIL: 0 IU/ML

## 2022-05-06 ENCOUNTER — APPOINTMENT (OUTPATIENT)
Dept: GASTROENTEROLOGY | Facility: CLINIC | Age: 87
End: 2022-05-06
Payer: MEDICARE

## 2022-05-06 VITALS
OXYGEN SATURATION: 94 % | HEIGHT: 67 IN | BODY MASS INDEX: 20.25 KG/M2 | WEIGHT: 129 LBS | TEMPERATURE: 96.8 F | SYSTOLIC BLOOD PRESSURE: 132 MMHG | HEART RATE: 73 BPM | DIASTOLIC BLOOD PRESSURE: 50 MMHG

## 2022-05-06 PROCEDURE — 82270 OCCULT BLOOD FECES: CPT

## 2022-05-06 PROCEDURE — 99213 OFFICE O/P EST LOW 20 MIN: CPT

## 2022-05-06 NOTE — ASSESSMENT
[FreeTextEntry1] : The patient is an 86-year-old female with a history of reflux currently under good control.  The patient has pelvic pressure as well as frequent urination and states she does see a gynecologist.  She has incomplete evacuation which I feel is on the basis of her underlying pelvic floor dysfunction.  She was told to take fiber and probiotics.  She may be a candidate for pelvic floor exercises or less likely biofeedback in the future.  She states that she will make an appointment with a urogynecologist for further instruction.  The stool is guaiac negative and I do not feel that she has any proximal obstructive colonic pathology.  She will get back to me with a progress report in several weeks.

## 2022-05-06 NOTE — HISTORY OF PRESENT ILLNESS
[FreeTextEntry1] : I saw your patient Subha Wise in follow-up today.  The patient is an 86-year-old female who has been followed in this practice for colorectal cancer screening and dyspeptic symptoms.  The patient takes an acid reducing medication on a fairly regular basis.  The patient has a history of diabetes and diverticulosis.  She is up-to-date on her colonoscopic examinations.  Over the years the patient has noted both urinary symptoms as well as difficulty in defecation.  Patient has pressure in the vaginal and rectal area and has a sensation of incomplete evacuation.  There is no blood in the stool or on the toilet tissue.  The patient does not abuse tobacco caffeine or ethanol.  Subha has been diagnosed with pelvic floor dysfunction by a urogynecologist and has not seen the physician in a number of years.  She states that she has forgotten about the diagnosis.

## 2022-05-06 NOTE — REVIEW OF SYSTEMS
[As Noted in HPI] : as noted in HPI [Pelvic Pain] : pelvic pain [FreeTextEntry7] : Incomplete evacuation. [FreeTextEntry8] : Frequent urination.

## 2022-05-06 NOTE — PHYSICAL EXAM
[General Appearance - Alert] : alert [General Appearance - In No Acute Distress] : in no acute distress [General Appearance - Well Nourished] : well nourished [Respiration, Rhythm And Depth] : normal respiratory rhythm and effort [Auscultation Breath Sounds / Voice Sounds] : lungs were clear to auscultation bilaterally [Apical Impulse] : the apical impulse was normal [Heart Rate And Rhythm] : heart rate was normal and rhythm regular [Heart Sounds] : normal S1 and S2 [Bowel Sounds] : normal bowel sounds [Abdomen Soft] : soft [Abdomen Tenderness] : non-tender [] : no hepato-splenomegaly [Occult Blood Positive] : stool was negative for occult blood [FreeTextEntry1] : There is decreased sphincter tone with pasty stool in the rectal vault which is guaiac negative.

## 2022-05-08 ENCOUNTER — INPATIENT (INPATIENT)
Facility: HOSPITAL | Age: 87
LOS: 4 days | Discharge: HOME HEALTH | DRG: 812 | End: 2022-05-13
Attending: INTERNAL MEDICINE | Admitting: STUDENT IN AN ORGANIZED HEALTH CARE EDUCATION/TRAINING PROGRAM
Payer: MEDICARE

## 2022-05-08 VITALS
SYSTOLIC BLOOD PRESSURE: 110 MMHG | RESPIRATION RATE: 19 BRPM | OXYGEN SATURATION: 97 % | HEART RATE: 66 BPM | DIASTOLIC BLOOD PRESSURE: 63 MMHG | HEIGHT: 67 IN | WEIGHT: 130.95 LBS | TEMPERATURE: 98 F

## 2022-05-08 DIAGNOSIS — I10 ESSENTIAL (PRIMARY) HYPERTENSION: ICD-10-CM

## 2022-05-08 DIAGNOSIS — E11.9 TYPE 2 DIABETES MELLITUS WITHOUT COMPLICATIONS: ICD-10-CM

## 2022-05-08 DIAGNOSIS — N17.9 ACUTE KIDNEY FAILURE, UNSPECIFIED: ICD-10-CM

## 2022-05-08 DIAGNOSIS — M62.82 RHABDOMYOLYSIS: ICD-10-CM

## 2022-05-08 DIAGNOSIS — Z86.79 PERSONAL HISTORY OF OTHER DISEASES OF THE CIRCULATORY SYSTEM: ICD-10-CM

## 2022-05-08 DIAGNOSIS — R74.01 ELEVATION OF LEVELS OF LIVER TRANSAMINASE LEVELS: ICD-10-CM

## 2022-05-08 DIAGNOSIS — Z29.9 ENCOUNTER FOR PROPHYLACTIC MEASURES, UNSPECIFIED: ICD-10-CM

## 2022-05-08 LAB
ALBUMIN SERPL ELPH-MCNC: 3.6 G/DL — SIGNIFICANT CHANGE UP (ref 3.3–5)
ALP SERPL-CCNC: 68 U/L — SIGNIFICANT CHANGE UP (ref 40–120)
ALT FLD-CCNC: 275 U/L — HIGH (ref 10–45)
ANION GAP SERPL CALC-SCNC: 17 MMOL/L — SIGNIFICANT CHANGE UP (ref 5–17)
ANION GAP SERPL CALC-SCNC: 21 MMOL/L — HIGH (ref 5–17)
APPEARANCE UR: ABNORMAL
APTT BLD: 25 SEC — LOW (ref 27.5–35.5)
AST SERPL-CCNC: 516 U/L — HIGH (ref 10–40)
BACTERIA # UR AUTO: NEGATIVE — SIGNIFICANT CHANGE UP
BASE EXCESS BLDV CALC-SCNC: -5.8 MMOL/L — LOW (ref -2–2)
BASOPHILS # BLD AUTO: 0.02 K/UL — SIGNIFICANT CHANGE UP (ref 0–0.2)
BASOPHILS NFR BLD AUTO: 0.3 % — SIGNIFICANT CHANGE UP (ref 0–2)
BILIRUB SERPL-MCNC: 0.4 MG/DL — SIGNIFICANT CHANGE UP (ref 0.2–1.2)
BILIRUB UR-MCNC: NEGATIVE — SIGNIFICANT CHANGE UP
BLOOD GAS VENOUS - CREATININE: SIGNIFICANT CHANGE UP MG/DL (ref 0.5–1.3)
BUN SERPL-MCNC: 91 MG/DL — HIGH (ref 7–23)
BUN SERPL-MCNC: 94 MG/DL — HIGH (ref 7–23)
CA-I SERPL-SCNC: 1.15 MMOL/L — SIGNIFICANT CHANGE UP (ref 1.15–1.33)
CALCIUM SERPL-MCNC: 8.6 MG/DL — SIGNIFICANT CHANGE UP (ref 8.4–10.5)
CALCIUM SERPL-MCNC: 9.1 MG/DL — SIGNIFICANT CHANGE UP (ref 8.4–10.5)
CHLORIDE BLDV-SCNC: 100 MMOL/L — SIGNIFICANT CHANGE UP (ref 96–108)
CHLORIDE SERPL-SCNC: 100 MMOL/L — SIGNIFICANT CHANGE UP (ref 96–108)
CHLORIDE SERPL-SCNC: 101 MMOL/L — SIGNIFICANT CHANGE UP (ref 96–108)
CK MB BLD-MCNC: 0.7 % — SIGNIFICANT CHANGE UP (ref 0–3.5)
CK MB CFR SERPL CALC: 91.5 NG/ML — HIGH (ref 0–3.8)
CK SERPL-CCNC: CRITICAL HIGH U/L (ref 25–170)
CK SERPL-CCNC: CRITICAL HIGH U/L (ref 25–170)
CO2 BLDV-SCNC: 24 MMOL/L — SIGNIFICANT CHANGE UP (ref 22–26)
CO2 SERPL-SCNC: 15 MMOL/L — LOW (ref 22–31)
CO2 SERPL-SCNC: 19 MMOL/L — LOW (ref 22–31)
COLOR SPEC: SIGNIFICANT CHANGE UP
CREAT SERPL-MCNC: 6.42 MG/DL — HIGH (ref 0.5–1.3)
CREAT SERPL-MCNC: 6.71 MG/DL — HIGH (ref 0.5–1.3)
D DIMER BLD IA.RAPID-MCNC: 724 NG/ML DDU — HIGH
DIFF PNL FLD: ABNORMAL
EGFR: 6 ML/MIN/1.73M2 — LOW
EGFR: 6 ML/MIN/1.73M2 — LOW
EOSINOPHIL # BLD AUTO: 0.07 K/UL — SIGNIFICANT CHANGE UP (ref 0–0.5)
EOSINOPHIL NFR BLD AUTO: 0.9 % — SIGNIFICANT CHANGE UP (ref 0–6)
EPI CELLS # UR: 1 /HPF — SIGNIFICANT CHANGE UP
GAS PNL BLDV: 133 MMOL/L — LOW (ref 136–145)
GAS PNL BLDV: SIGNIFICANT CHANGE UP
GAS PNL BLDV: SIGNIFICANT CHANGE UP
GLUCOSE BLDC GLUCOMTR-MCNC: 119 MG/DL — HIGH (ref 70–99)
GLUCOSE BLDC GLUCOMTR-MCNC: 137 MG/DL — HIGH (ref 70–99)
GLUCOSE BLDC GLUCOMTR-MCNC: 57 MG/DL — LOW (ref 70–99)
GLUCOSE BLDC GLUCOMTR-MCNC: 79 MG/DL — SIGNIFICANT CHANGE UP (ref 70–99)
GLUCOSE BLDV-MCNC: 58 MG/DL — LOW (ref 70–99)
GLUCOSE SERPL-MCNC: 51 MG/DL — CRITICAL LOW (ref 70–99)
GLUCOSE SERPL-MCNC: 97 MG/DL — SIGNIFICANT CHANGE UP (ref 70–99)
GLUCOSE UR QL: NEGATIVE — SIGNIFICANT CHANGE UP
HCO3 BLDV-SCNC: 23 MMOL/L — SIGNIFICANT CHANGE UP (ref 22–29)
HCT VFR BLD CALC: 34.6 % — SIGNIFICANT CHANGE UP (ref 34.5–45)
HCT VFR BLDA CALC: 49 % — HIGH (ref 34.5–46.5)
HGB BLD CALC-MCNC: 16.4 G/DL — HIGH (ref 11.7–16.1)
HGB BLD-MCNC: 11.2 G/DL — LOW (ref 11.5–15.5)
HYALINE CASTS # UR AUTO: 4 /LPF — HIGH (ref 0–2)
IMM GRANULOCYTES NFR BLD AUTO: 0.4 % — SIGNIFICANT CHANGE UP (ref 0–1.5)
INR BLD: 0.97 RATIO — SIGNIFICANT CHANGE UP (ref 0.88–1.16)
KETONES UR-MCNC: NEGATIVE — SIGNIFICANT CHANGE UP
LACTATE BLDV-MCNC: 0.9 MMOL/L — SIGNIFICANT CHANGE UP (ref 0.7–2)
LEUKOCYTE ESTERASE UR-ACNC: NEGATIVE — SIGNIFICANT CHANGE UP
LYMPHOCYTES # BLD AUTO: 0.82 K/UL — LOW (ref 1–3.3)
LYMPHOCYTES # BLD AUTO: 10.7 % — LOW (ref 13–44)
MAGNESIUM SERPL-MCNC: 2.7 MG/DL — HIGH (ref 1.6–2.6)
MCHC RBC-ENTMCNC: 30.7 PG — SIGNIFICANT CHANGE UP (ref 27–34)
MCHC RBC-ENTMCNC: 32.4 GM/DL — SIGNIFICANT CHANGE UP (ref 32–36)
MCV RBC AUTO: 94.8 FL — SIGNIFICANT CHANGE UP (ref 80–100)
MONOCYTES # BLD AUTO: 0.75 K/UL — SIGNIFICANT CHANGE UP (ref 0–0.9)
MONOCYTES NFR BLD AUTO: 9.8 % — SIGNIFICANT CHANGE UP (ref 2–14)
NEUTROPHILS # BLD AUTO: 5.99 K/UL — SIGNIFICANT CHANGE UP (ref 1.8–7.4)
NEUTROPHILS NFR BLD AUTO: 77.9 % — HIGH (ref 43–77)
NITRITE UR-MCNC: NEGATIVE — SIGNIFICANT CHANGE UP
NRBC # BLD: 0 /100 WBCS — SIGNIFICANT CHANGE UP (ref 0–0)
PCO2 BLDV: 54 MMHG — HIGH (ref 39–42)
PH BLDV: 7.23 — LOW (ref 7.32–7.43)
PH UR: 6 — SIGNIFICANT CHANGE UP (ref 5–8)
PHOSPHATE SERPL-MCNC: 7.6 MG/DL — HIGH (ref 2.5–4.5)
PLATELET # BLD AUTO: 199 K/UL — SIGNIFICANT CHANGE UP (ref 150–400)
PO2 BLDV: 22 MMHG — LOW (ref 25–45)
POTASSIUM BLDV-SCNC: 4.1 MMOL/L — SIGNIFICANT CHANGE UP (ref 3.5–5.1)
POTASSIUM SERPL-MCNC: 4.3 MMOL/L — SIGNIFICANT CHANGE UP (ref 3.5–5.3)
POTASSIUM SERPL-MCNC: 4.6 MMOL/L — SIGNIFICANT CHANGE UP (ref 3.5–5.3)
POTASSIUM SERPL-SCNC: 4.3 MMOL/L — SIGNIFICANT CHANGE UP (ref 3.5–5.3)
POTASSIUM SERPL-SCNC: 4.6 MMOL/L — SIGNIFICANT CHANGE UP (ref 3.5–5.3)
PROT SERPL-MCNC: 6.9 G/DL — SIGNIFICANT CHANGE UP (ref 6–8.3)
PROT UR-MCNC: 100 — SIGNIFICANT CHANGE UP
PROTHROM AB SERPL-ACNC: 11.1 SEC — SIGNIFICANT CHANGE UP (ref 10.5–13.4)
RBC # BLD: 3.65 M/UL — LOW (ref 3.8–5.2)
RBC # FLD: 13.5 % — SIGNIFICANT CHANGE UP (ref 10.3–14.5)
RBC CASTS # UR COMP ASSIST: 14 /HPF — HIGH (ref 0–4)
SAO2 % BLDV: 30.9 % — LOW (ref 67–88)
SODIUM SERPL-SCNC: 136 MMOL/L — SIGNIFICANT CHANGE UP (ref 135–145)
SODIUM SERPL-SCNC: 137 MMOL/L — SIGNIFICANT CHANGE UP (ref 135–145)
SP GR SPEC: 1.01 — SIGNIFICANT CHANGE UP (ref 1.01–1.02)
URATE SERPL-MCNC: 5.8 MG/DL — SIGNIFICANT CHANGE UP (ref 2.5–7)
UROBILINOGEN FLD QL: NEGATIVE — SIGNIFICANT CHANGE UP
WBC # BLD: 7.68 K/UL — SIGNIFICANT CHANGE UP (ref 3.8–10.5)
WBC # FLD AUTO: 7.68 K/UL — SIGNIFICANT CHANGE UP (ref 3.8–10.5)
WBC UR QL: 8 /HPF — HIGH (ref 0–5)

## 2022-05-08 PROCEDURE — 73522 X-RAY EXAM HIPS BI 3-4 VIEWS: CPT | Mod: 26

## 2022-05-08 PROCEDURE — 99223 1ST HOSP IP/OBS HIGH 75: CPT | Mod: GC

## 2022-05-08 PROCEDURE — 93010 ELECTROCARDIOGRAM REPORT: CPT

## 2022-05-08 PROCEDURE — 76937 US GUIDE VASCULAR ACCESS: CPT | Mod: 26

## 2022-05-08 PROCEDURE — 99291 CRITICAL CARE FIRST HOUR: CPT | Mod: GC

## 2022-05-08 RX ORDER — DEXTROSE 50 % IN WATER 50 %
12.5 SYRINGE (ML) INTRAVENOUS ONCE
Refills: 0 | Status: DISCONTINUED | OUTPATIENT
Start: 2022-05-08 | End: 2022-05-13

## 2022-05-08 RX ORDER — ASPIRIN/CALCIUM CARB/MAGNESIUM 324 MG
81 TABLET ORAL DAILY
Refills: 0 | Status: DISCONTINUED | OUTPATIENT
Start: 2022-05-08 | End: 2022-05-11

## 2022-05-08 RX ORDER — METOPROLOL TARTRATE 50 MG
25 TABLET ORAL DAILY
Refills: 0 | Status: DISCONTINUED | OUTPATIENT
Start: 2022-05-08 | End: 2022-05-13

## 2022-05-08 RX ORDER — INSULIN LISPRO 100/ML
VIAL (ML) SUBCUTANEOUS
Refills: 0 | Status: DISCONTINUED | OUTPATIENT
Start: 2022-05-08 | End: 2022-05-13

## 2022-05-08 RX ORDER — FERROUS SULFATE 325(65) MG
0 TABLET ORAL
Qty: 0 | Refills: 0 | DISCHARGE

## 2022-05-08 RX ORDER — PREGABALIN 225 MG/1
1000 CAPSULE ORAL DAILY
Refills: 0 | Status: DISCONTINUED | OUTPATIENT
Start: 2022-05-08 | End: 2022-05-13

## 2022-05-08 RX ORDER — DEXTROSE 50 % IN WATER 50 %
25 SYRINGE (ML) INTRAVENOUS ONCE
Refills: 0 | Status: DISCONTINUED | OUTPATIENT
Start: 2022-05-08 | End: 2022-05-13

## 2022-05-08 RX ORDER — DONEPEZIL HYDROCHLORIDE 10 MG/1
5 TABLET, FILM COATED ORAL AT BEDTIME
Refills: 0 | Status: DISCONTINUED | OUTPATIENT
Start: 2022-05-08 | End: 2022-05-13

## 2022-05-08 RX ORDER — PREGABALIN 225 MG/1
0 CAPSULE ORAL
Qty: 0 | Refills: 0 | DISCHARGE

## 2022-05-08 RX ORDER — AMIODARONE HYDROCHLORIDE 400 MG/1
200 TABLET ORAL DAILY
Refills: 0 | Status: DISCONTINUED | OUTPATIENT
Start: 2022-05-08 | End: 2022-05-13

## 2022-05-08 RX ORDER — SODIUM CHLORIDE 9 MG/ML
1000 INJECTION, SOLUTION INTRAVENOUS
Refills: 0 | Status: DISCONTINUED | OUTPATIENT
Start: 2022-05-08 | End: 2022-05-08

## 2022-05-08 RX ORDER — GLUCAGON INJECTION, SOLUTION 0.5 MG/.1ML
1 INJECTION, SOLUTION SUBCUTANEOUS ONCE
Refills: 0 | Status: DISCONTINUED | OUTPATIENT
Start: 2022-05-08 | End: 2022-05-13

## 2022-05-08 RX ORDER — IRBESARTAN 75 MG/1
1 TABLET ORAL
Qty: 0 | Refills: 0 | DISCHARGE

## 2022-05-08 RX ORDER — SODIUM BICARBONATE 1 MEQ/ML
0.38 SYRINGE (ML) INTRAVENOUS
Qty: 150 | Refills: 0 | Status: DISCONTINUED | OUTPATIENT
Start: 2022-05-08 | End: 2022-05-08

## 2022-05-08 RX ORDER — PANTOPRAZOLE SODIUM 20 MG/1
40 TABLET, DELAYED RELEASE ORAL
Refills: 0 | Status: DISCONTINUED | OUTPATIENT
Start: 2022-05-08 | End: 2022-05-13

## 2022-05-08 RX ORDER — REPAGLINIDE 1 MG/1
1 TABLET ORAL
Qty: 0 | Refills: 0 | DISCHARGE

## 2022-05-08 RX ORDER — SODIUM BICARBONATE 1 MEQ/ML
0.38 SYRINGE (ML) INTRAVENOUS
Qty: 150 | Refills: 0 | Status: DISCONTINUED | OUTPATIENT
Start: 2022-05-08 | End: 2022-05-13

## 2022-05-08 RX ORDER — SODIUM CHLORIDE 9 MG/ML
1000 INJECTION, SOLUTION INTRAVENOUS
Refills: 0 | Status: DISCONTINUED | OUTPATIENT
Start: 2022-05-08 | End: 2022-05-13

## 2022-05-08 RX ORDER — METFORMIN HYDROCHLORIDE 850 MG/1
1 TABLET ORAL
Qty: 0 | Refills: 0 | DISCHARGE

## 2022-05-08 RX ORDER — OMEPRAZOLE 10 MG/1
1 CAPSULE, DELAYED RELEASE ORAL
Qty: 0 | Refills: 0 | DISCHARGE

## 2022-05-08 RX ORDER — DEXTROSE 50 % IN WATER 50 %
15 SYRINGE (ML) INTRAVENOUS ONCE
Refills: 0 | Status: DISCONTINUED | OUTPATIENT
Start: 2022-05-08 | End: 2022-05-13

## 2022-05-08 RX ORDER — ESTRADIOL 4 UG/1
1 INSERT VAGINAL
Qty: 0 | Refills: 0 | DISCHARGE

## 2022-05-08 RX ORDER — SODIUM CHLORIDE 9 MG/ML
1000 INJECTION, SOLUTION INTRAVENOUS ONCE
Refills: 0 | Status: COMPLETED | OUTPATIENT
Start: 2022-05-08 | End: 2022-05-08

## 2022-05-08 RX ORDER — INSULIN LISPRO 100/ML
VIAL (ML) SUBCUTANEOUS AT BEDTIME
Refills: 0 | Status: DISCONTINUED | OUTPATIENT
Start: 2022-05-08 | End: 2022-05-13

## 2022-05-08 RX ORDER — AZELASTINE 137 UG/1
0 SPRAY, METERED NASAL
Qty: 0 | Refills: 0 | DISCHARGE

## 2022-05-08 RX ADMIN — SODIUM CHLORIDE 1000 MILLILITER(S): 9 INJECTION, SOLUTION INTRAVENOUS at 18:33

## 2022-05-08 RX ADMIN — DONEPEZIL HYDROCHLORIDE 5 MILLIGRAM(S): 10 TABLET, FILM COATED ORAL at 22:24

## 2022-05-08 RX ADMIN — SODIUM CHLORIDE 200 MILLILITER(S): 9 INJECTION, SOLUTION INTRAVENOUS at 20:49

## 2022-05-08 RX ADMIN — Medication 150 MEQ/KG/HR: at 22:24

## 2022-05-08 RX ADMIN — SODIUM CHLORIDE 1000 MILLILITER(S): 9 INJECTION, SOLUTION INTRAVENOUS at 17:47

## 2022-05-08 NOTE — H&P ADULT - NSHPSOCIALHISTORY_GEN_ALL_CORE
Lives with grandson and great grandson  Baseline independent with ADL's, walking   at [employer]    Former smoker 40 years ago  No alcohol  No illicit drugs

## 2022-05-08 NOTE — ED PROVIDER NOTE - OBJECTIVE STATEMENT
85 yo female with pmh DM, paroxysmal Atrial tachycardia on amiodarone, HLD here for evaluation of BL leg cramping and weakness x approx 3 weeks. Pt states she has been pain when trying to lift her legs, mainly her calves and occasionally her thighs. Pain has been intermittent however becoming gradually more constant. She comes today as she had difficulty getting in and out of the car as well as putting her pants on today, She has been ambulatory at home however has been needing to use a cane 2/2 pain and having difficulty picking up her legs. She states she had an episode of sciatica of the R. hip last month and reports she had a neg MRI from her orthopedist Gregg Bower and subsequent resolution of sciatic pain s/p cortisone injection last month. PT denies fevers, chills, numbness, tingling, no bowel or bladder incontinence no recent falls, no headaches, changes in vision.

## 2022-05-08 NOTE — H&P ADULT - PROBLEM SELECTOR PLAN 2
Cr 6.71, baseline ~1.21.  Likely due to hypovolemia from "third-spacing" due to the influx of extracellular fluid into injured muscles from rhabdo.  +electrolyte derangements: Mag and Phos elevated.  K, Ca wnl. EKG without significant derangements.    - S/p 1L LR. Maintenance LR 200cc/hr.  - monitor CMP/Mg/Phos/uric acid q6h  - Check uric acid, if >8, add allopurinol  - hold home telmisartan, statin   - monitor I/Os, avoid nephrotoxins Cr 6.71, baseline ~1.45 in 2020.  Likely due to hypovolemia from "third-spacing" due to the influx of extracellular fluid into injured muscles from rhabdo.  +electrolyte derangements: Mag and Phos elevated.  K, Ca wnl. EKG without significant derangements.    - S/p 1L LR. Maintenance LR 200cc/hr.  - monitor CMP/Mg/Phos/uric acid q6h  - Check uric acid, if >8, add allopurinol  - hold home telmisartan, statin   - monitor I/Os, avoid nephrotoxins Cr 6.71, baseline ~1.45 in 2020.  Likely due to hypovolemia from "third-spacing" due to the influx of extracellular fluid into injured muscles from rhabdo.  +electrolyte derangements: Mag and Phos elevated.  K, Ca wnl. EKG without significant derangements.    - S/p 1L LR. Maintenance LR 200cc/hr.  - monitor CMP/Mg/Phos/uric acid q6h  - Check uric acid, if >8, add allopurinol  - hold home telmisartan, statin   - escobar in place, monitor I/Os, avoid nephrotoxins Cr 6.71, baseline ~1.45 in 2020.  Likely due to hypovolemia from "third-spacing" due to the influx of extracellular fluid into injured muscles from rhabdo.  +electrolyte derangements: Mag and Phos elevated.  K, Ca wnl. EKG without significant derangements.    - S/p 1L LR.  - Sodium bicarb 150meq/1L D5W 125cc/hr x 12 hours.  Check urine pH in AM.  - monitor CMP/Mg/Phos/uric acid q6h  - Check uric acid, if >8, add allopurinol  - hold home telmisartan, statin   - escobar in place, monitor strict I/Os, avoid nephrotoxins  - if oliguric or electrolyte derangements c/f renal failure, will consult nephrology urgently. Otherwise nephrology consult in AM

## 2022-05-08 NOTE — H&P ADULT - HISTORY OF PRESENT ILLNESS
86F w/ T2DM, HLD, paroxysmal atrial tachycardia who presents with BLE cramps x 3 weeks, R>L pain     86F w/ T2DM, HLD, IBS, paroxysmal atrial tachycardia, GERD who presents with BLE cramps x 3 weeks, associated weakness x 1 week.  Has pain when she lifts her legs, walking, or pressing on the gas pedal when driving - mainly in calves, occasionally thighs.  Pain has been worsening. She has had difficulty getting in and out of the car, putting her pants.  Has needed to use cane for past 1 week for difficulty ambulating due to weakness, baseline ambulates without assistance.  About 1 month ago, she had steroid injection for sciatica in R hip after negative MRI (done 4/13).  Per surescripts, she had methylprednisolone dose pack x 6days prescribed on 4/12.  Denies recent exertion, strenuous exercise.  Denies trauma. Reports urine color has been yellow to light yellow. Admits to + urinary frequency. Pt denies fevers, chills, CP, SOB, numbness, tingling, bowel or bladder incontinence, recent falls, headaches, changes in vision, sick contacts, URI symptoms. Denies illicit drugs.

## 2022-05-08 NOTE — H&P ADULT - PROBLEM SELECTOR PLAN 6
DVT ppx: heparin SC  Diet: CC/low phos, low K    PT consult once CK downtrends    C/w metoprolol succinate for paroxysmal atrial tach  C/w omeprazole -> pantoprazole for GERD/gastritis - hold telmisartan

## 2022-05-08 NOTE — H&P ADULT - PROBLEM SELECTOR PLAN 3
Likely related to hypovolemia from rhabdo.    - S/p 1L LR. Maintenance LR 200cc/hr.  - Monitor LFT's Likely related to hypovolemia from rhabdo.    - S/p 1L LR  - Monitor LFT's

## 2022-05-08 NOTE — H&P ADULT - NSICDXPASTMEDICALHX_GEN_ALL_CORE_FT
PAST MEDICAL HISTORY:  Diabetes mellitus type 2, controlled     HLD (hyperlipidemia)     HTN - Hypertension     IBS (irritable bowel syndrome)

## 2022-05-08 NOTE — H&P ADULT - ATTENDING COMMENTS
86F w/ above comorbidities p/w acute hernesto and nontraumatic rhabdomyolysis  - NAD, cardiopulmonary benign, abdomen benign, TPP of b/l leg, no flaccid paralysis in extremities appreciated  - start bicarb gtt overnight, plan for close monitoring of CrCl and electrolytes, will need nephrology consult  - etiology possibly from recent steroid use however will need to explore autoimmune disease  - hold statin      Petey Figueroa MD  Medicine Attending  Department of Hospital Medicine  pager: [phone] (available from 20:00 to 08:00)

## 2022-05-08 NOTE — ED PROVIDER NOTE - CLINICAL SUMMARY MEDICAL DECISION MAKING FREE TEXT BOX
Attending MD Shah : Patient here with cramping of bilateral lower extremities, states that she has been feeling weak, and having R > L leg pain. c/f electrolyte abdnormalities vs DVT vs rhabdo. Will obtain labs, CK, and reassess. Patient comfrotable at this time.

## 2022-05-08 NOTE — H&P ADULT - PROBLEM SELECTOR PLAN 7
DVT ppx: heparin SC  Diet: CC/low phos, low K    PT consult once CK downtrends    C/w omeprazole -> pantoprazole for GERD/gastritis

## 2022-05-08 NOTE — H&P ADULT - NSHPLABSRESULTS_GEN_ALL_CORE
LABS: Personally reviewed labs, imaging, and ECG                          11.2   7.68  )-----------( 199      ( 08 May 2022 16:49 )             34.6       05    137  |  101  |  94<H>  ----------------------------<  97  4.6   |  15<L>  |  6.71<H>    Ca    9.1      08 May 2022 16:49    TPro  6.9  /  Alb  3.6  /  TBili  0.4  /  DBili  x   /  AST  516<H>  /  ALT  275<H>  /  AlkPhos  68  05-08       LIVER FUNCTIONS - ( 08 May 2022 16:49 )  Alb: 3.6 g/dL / Pro: 6.9 g/dL / ALK PHOS: 68 U/L / ALT: 275 U/L / AST: 516 U/L / GGT: x                    Urinalysis Basic - ( 08 May 2022 18:06 )    Color: Light Yellow / Appearance: Slightly Turbid / S.012 / pH: x  Gluc: x / Ketone: Negative  / Bili: Negative / Urobili: Negative   Blood: x / Protein: 100 / Nitrite: Negative   Leuk Esterase: Negative / RBC: 14 /hpf / WBC 8 /HPF   Sq Epi: x / Non Sq Epi: 1 /hpf / Bacteria: Negative        PT/INR - ( 08 May 2022 18:49 )   PT: 11.1 sec;   INR: 0.97 ratio         PTT - ( 08 May 2022 18:49 )  PTT:25.0 sec    Lactate Trend      CARDIAC MARKERS ( 08 May 2022 16:49 )  x     / x     / 68873 U/L / x     / x            CAPILLARY BLOOD GLUCOSE                RADIOLOGY & ADDITIONAL TESTS: LABS: Personally reviewed labs, imaging, and ECG                          11.2   7.68  )-----------( 199      ( 08 May 2022 16:49 )             34.6       05    137  |  101  |  94<H>  ----------------------------<  97  4.6   |  15<L>  |  6.71<H>    Ca    9.1      08 May 2022 16:49    TPro  6.9  /  Alb  3.6  /  TBili  0.4  /  DBili  x   /  AST  516<H>  /  ALT  275<H>  /  AlkPhos  68  05-08       LIVER FUNCTIONS - ( 08 May 2022 16:49 )  Alb: 3.6 g/dL / Pro: 6.9 g/dL / ALK PHOS: 68 U/L / ALT: 275 U/L / AST: 516 U/L / GGT: x                    Urinalysis Basic - ( 08 May 2022 18:06 )    Color: Light Yellow / Appearance: Slightly Turbid / S.012 / pH: x  Gluc: x / Ketone: Negative  / Bili: Negative / Urobili: Negative   Blood: x / Protein: 100 / Nitrite: Negative   Leuk Esterase: Negative / RBC: 14 /hpf / WBC 8 /HPF   Sq Epi: x / Non Sq Epi: 1 /hpf / Bacteria: Negative        PT/INR - ( 08 May 2022 18:49 )   PT: 11.1 sec;   INR: 0.97 ratio         PTT - ( 08 May 2022 18:49 )  PTT:25.0 sec    Lactate Trend      CARDIAC MARKERS ( 08 May 2022 16:49 )  x     / x     / 25658 U/L / x     / x            CAPILLARY BLOOD GLUCOSE        RADIOLOGY & ADDITIONAL TESTS:    EKG:  Sinus rhythm with 1st degree AV block LABS: Personally reviewed labs, imaging, and ECG                          11.2   7.68  )-----------( 199      ( 08 May 2022 16:49 )             34.6       05    137  |  101  |  94<H>  ----------------------------<  97  4.6   |  15<L>  |  6.71<H>    Ca    9.1      08 May 2022 16:49    TPro  6.9  /  Alb  3.6  /  TBili  0.4  /  DBili  x   /  AST  516<H>  /  ALT  275<H>  /  AlkPhos  68  05-08       LIVER FUNCTIONS - ( 08 May 2022 16:49 )  Alb: 3.6 g/dL / Pro: 6.9 g/dL / ALK PHOS: 68 U/L / ALT: 275 U/L / AST: 516 U/L / GGT: x                    Urinalysis Basic - ( 08 May 2022 18:06 )    Color: Light Yellow / Appearance: Slightly Turbid / S.012 / pH: x  Gluc: x / Ketone: Negative  / Bili: Negative / Urobili: Negative   Blood: x / Protein: 100 / Nitrite: Negative   Leuk Esterase: Negative / RBC: 14 /hpf / WBC 8 /HPF   Sq Epi: x / Non Sq Epi: 1 /hpf / Bacteria: Negative        PT/INR - ( 08 May 2022 18:49 )   PT: 11.1 sec;   INR: 0.97 ratio         PTT - ( 08 May 2022 18:49 )  PTT:25.0 sec    Lactate Trend      CARDIAC MARKERS ( 08 May 2022 16:49 )  x     / x     / 68981 U/L / x     / x            CAPILLARY BLOOD GLUCOSE        RADIOLOGY & ADDITIONAL TESTS:  xray of pelvis does not demonstrate open fracture  EKG:  Sinus rhythm with 1st degree AV block

## 2022-05-08 NOTE — H&P ADULT - NSHPREVIEWOFSYSTEMS_GEN_ALL_CORE
General: No fevers, chills, fatigue, weight loss  HEENT: No headaches, acute visual changes, rhinorrhea, sore throat, dysphagia  CVS: No chest pain, palpitations  Resp: No cough, dyspnea, wheezing  GI: No abdominal pain, nausea, vomiting, constipation, diarrhea, hematochezia, melena  : No dysuria, frequency, hematuria, or discharge  MSK: No joint pain or swelling  Ext: No edema, no claudication  Skin: No rashes or itching  Heme: No easy bruising or petechiae  Neuro: No confusion, numbness, focal weakness, or loss of consciousness  Endocrine: No excessive heat or cold symptoms, polyuria, polydipsia General: No fevers, chills  HEENT: No headaches, acute visual changes, rhinorrhea, sore throat, dysphagia  CVS: No chest pain, palpitations  Resp: No cough, dyspnea, wheezing  GI: No abdominal pain, nausea, vomiting, constipation, diarrhea, hematochezia, melena  : + urinary frequency. No dysuria, hematuria, or discharge  MSK: + cramping in BLE.  No joint pain or swelling  Ext: No edema, no claudication  Skin: No rashes or itching  Heme: No easy bruising or petechiae  Neuro: No confusion, numbness, focal weakness, or loss of consciousness  Endocrine: No excessive heat or cold symptoms, polydipsia

## 2022-05-08 NOTE — ED PROVIDER NOTE - NS ED ROS FT
Constitutional: No fever or chills  Eyes: No visual changes  CV: No chest pain   Resp: No SOB   GI: No abd pain. No nausea or vomiting.   : No dysuria, hematuria.   MSK: see hpi  Skin: see hpi

## 2022-05-08 NOTE — ED PROVIDER NOTE - PROGRESS NOTE DETAILS
Attending MD Shah : Patient found to have SHAZIA and rhabdo. No chest pain or SOB. States that she has been having cramping of her bilateral lower extremities. Unclear etiology of rhabdo - perhaps from rosuvastatin use. No abdominal pain, TTP. Will admit. Isabel placed with return of clear urine. Patient TBA.

## 2022-05-08 NOTE — ED ADULT NURSE NOTE - OBJECTIVE STATEMENT
86 yr old female came in after stating she has been having bilat leg pain and weakness for 3 weeks. pt has a h/o diabetes cardiac arrythmia back pain . on assessment a and o x 3 lungs clear bad soft non tender no swelling in extremities no n/v/d no fevers no other complaints or issues.

## 2022-05-08 NOTE — ED PROVIDER NOTE - PHYSICAL EXAMINATION
A&Ox3, NAD, well appearing  NCAT. PERRL, EOMI.  Lungs CTAB. No w/r/r  Cardiac +S1S2, RRR, No m/r/g.   Abd soft, NT/ND, +BS, no rebound or guarding.   Extremities: cap refill <2, pulses in distal extremities 4+, no edema. NO calf ttp    Skin without rash.   Neuro: + decreased sensation to lower extremities BL, strength 5/5 to plantar felxion/extension BL strength 5/5 to knee flexion/extension BL, hip flexion strength 4/5 R side, 5/5 L side. upper extremity strength 5/5 BL to shoulders and elbows. No saddle anesthesia.

## 2022-05-08 NOTE — H&P ADULT - PROBLEM SELECTOR PLAN 1
BLE cramps x 3months and worsening weakness. CK >13K and UA with moderate blood likely myoglobinuria.  Suspect that recent epidural steroid injection +/- methylpred dose pack ~1 month ago may have induced rhabdomyolysis vs. steroid induced myositis.  Another possible source is rosuvastatin.    - S/p 1L LR. Maintenance LR 200cc/hr  - monitor CMP/Mg/Phos/uric acid q6h  - Check BLE Duplex  - hold statin  - Trend CK to downtrend BLE cramps x 3months and worsening weakness. CK >13K and UA with moderate blood likely myoglobinuria.  Suspect that recent epidural steroid injection +/- methylpred dose pack ~1 month ago may have induced rhabdomyolysis vs. steroid induced myositis.  Another possible source is rosuvastatin.  Metabolic acidosis likely due to rhabdomyolysis, however lactic acid not elevated on VBG.    - S/p 1L LR. Maintenance LR 200cc/hr  - monitor CMP/Mg/Phos/uric acid q6h  - Check BLE Duplex  - hold statin  - Trend CK to downtrend  - Check serum lactate BLE cramps x 3months and worsening weakness. CK >13K and UA with moderate blood likely myoglobinuria.  Suspect that recent epidural steroid injection +/- methylpred dose pack ~1 month ago may have induced rhabdomyolysis vs. steroid induced myositis.  Another possible source is rosuvastatin.  Metabolic acidosis likely due to rhabdomyolysis, however lactic acid not elevated on VBG.    - S/p 1L LR.  - Sodium bicarb 150meq/1L D5W 125cc/hr x 12 hours.  Check urine pH in AM.  - monitor CMP/Mg/Phos/uric acid q6h  - Check BLE Duplex  - hold statin  - Trend CK to downtrend  - Check DANIEL, myoglobin panel to evaluate for other myopathies.  - if oliguric or electrolyte derangements c/f renal failure, will consult nephrology urgently. Otherwise nephrology consult in AM BLE cramps x 3months and worsening weakness. CK >13K and UA with moderate blood likely myoglobinuria.  Suspect that recent epidural steroid injection +/- methylpred dose pack ~1 month ago may have induced rhabdomyolysis vs. steroid induced myositis.  Another possible source is rosuvastatin.  Metabolic acidosis likely due to rhabdomyolysis, however lactic acid not elevated on VBG.    - S/p 1L LR.  - Sodium bicarb 150meq/1L D5W 150cc/hr x 12 hours.  Check urine pH in AM.  - monitor CMP/Mg/Phos/uric acid q6h  - Check BLE Duplex  - hold statin  - Trend CK to downtrend  - Check DANIEL, myoglobin panel to evaluate for other myopathies.  - if oliguric or electrolyte derangements c/f renal failure, will consult nephrology urgently. Otherwise nephrology consult in AM BLE cramps x 3months and worsening weakness. CK >13K and UA with moderate blood likely myoglobinuria.  Suspect that recent epidural steroid injection +/- methylpred dose pack ~1 month ago may have induced rhabdomyolysis vs. steroid induced myositis.  Another possible source is rosuvastatin.  Metabolic acidosis likely due to rhabdomyolysis, however lactic acid not elevated on VBG.    - S/p 1L LR.  - Given CK< 5k, start sodium bicarb gtt 150meq/1L D5W 150cc/hr x 12 hours to alkalinize urine.  Check urine pH in AM.  - monitor CMP/Mg/Phos/uric acid q6h  - Check BLE Duplex  - hold statin  - Trend CK to downtrend  - Check DANIEL, myoglobin panel to evaluate for other myopathies.  - if oliguric or electrolyte derangements c/f renal failure, will consult nephrology urgently. Otherwise nephrology consult in AM

## 2022-05-08 NOTE — H&P ADULT - ASSESSMENT
86F w/ T2DM, HLD, IBS, paroxysmal atrial tachycardia, GERD, recent sciatica s/p steroid injection and PO steroid who presents with BLE cramps x 3 weeks, associated weakness x 1 week.  Admitted for rhabdomyolysis and SHAZIA.

## 2022-05-09 ENCOUNTER — TRANSCRIPTION ENCOUNTER (OUTPATIENT)
Age: 87
End: 2022-05-09

## 2022-05-09 LAB
A1C WITH ESTIMATED AVERAGE GLUCOSE RESULT: 8.2 % — HIGH (ref 4–5.6)
ALBUMIN SERPL ELPH-MCNC: 3.4 G/DL — SIGNIFICANT CHANGE UP (ref 3.3–5)
ALP SERPL-CCNC: 67 U/L — SIGNIFICANT CHANGE UP (ref 40–120)
ALT FLD-CCNC: 233 U/L — HIGH (ref 10–45)
ANION GAP SERPL CALC-SCNC: 16 MMOL/L — SIGNIFICANT CHANGE UP (ref 5–17)
ANION GAP SERPL CALC-SCNC: 17 MMOL/L — SIGNIFICANT CHANGE UP (ref 5–17)
ANION GAP SERPL CALC-SCNC: 17 MMOL/L — SIGNIFICANT CHANGE UP (ref 5–17)
AST SERPL-CCNC: 391 U/L — HIGH (ref 10–40)
BILIRUB SERPL-MCNC: 0.3 MG/DL — SIGNIFICANT CHANGE UP (ref 0.2–1.2)
BUN SERPL-MCNC: 85 MG/DL — HIGH (ref 7–23)
BUN SERPL-MCNC: 86 MG/DL — HIGH (ref 7–23)
BUN SERPL-MCNC: 88 MG/DL — HIGH (ref 7–23)
CALCIUM SERPL-MCNC: 8 MG/DL — LOW (ref 8.4–10.5)
CALCIUM SERPL-MCNC: 8.1 MG/DL — LOW (ref 8.4–10.5)
CALCIUM SERPL-MCNC: 8.4 MG/DL — SIGNIFICANT CHANGE UP (ref 8.4–10.5)
CHLORIDE SERPL-SCNC: 94 MMOL/L — LOW (ref 96–108)
CHLORIDE SERPL-SCNC: 97 MMOL/L — SIGNIFICANT CHANGE UP (ref 96–108)
CHLORIDE SERPL-SCNC: 98 MMOL/L — SIGNIFICANT CHANGE UP (ref 96–108)
CHLORIDE UR-SCNC: 58 MMOL/L — SIGNIFICANT CHANGE UP
CK SERPL-CCNC: 7549 U/L — HIGH (ref 25–170)
CK SERPL-CCNC: 8040 U/L — HIGH (ref 25–170)
CK SERPL-CCNC: 9331 U/L — HIGH (ref 25–170)
CO2 SERPL-SCNC: 25 MMOL/L — SIGNIFICANT CHANGE UP (ref 22–31)
CO2 SERPL-SCNC: 26 MMOL/L — SIGNIFICANT CHANGE UP (ref 22–31)
CO2 SERPL-SCNC: 27 MMOL/L — SIGNIFICANT CHANGE UP (ref 22–31)
CREAT ?TM UR-MCNC: 25 MG/DL — SIGNIFICANT CHANGE UP
CREAT SERPL-MCNC: 6.01 MG/DL — HIGH (ref 0.5–1.3)
CREAT SERPL-MCNC: 6.11 MG/DL — HIGH (ref 0.5–1.3)
CREAT SERPL-MCNC: 6.24 MG/DL — HIGH (ref 0.5–1.3)
CULTURE RESULTS: NO GROWTH — SIGNIFICANT CHANGE UP
EGFR: 6 ML/MIN/1.73M2 — LOW
ESTIMATED AVERAGE GLUCOSE: 189 MG/DL — HIGH (ref 68–114)
GLUCOSE BLDC GLUCOMTR-MCNC: 105 MG/DL — HIGH (ref 70–99)
GLUCOSE BLDC GLUCOMTR-MCNC: 150 MG/DL — HIGH (ref 70–99)
GLUCOSE BLDC GLUCOMTR-MCNC: 193 MG/DL — HIGH (ref 70–99)
GLUCOSE BLDC GLUCOMTR-MCNC: 259 MG/DL — HIGH (ref 70–99)
GLUCOSE SERPL-MCNC: 111 MG/DL — HIGH (ref 70–99)
GLUCOSE SERPL-MCNC: 146 MG/DL — HIGH (ref 70–99)
GLUCOSE SERPL-MCNC: 185 MG/DL — HIGH (ref 70–99)
MAGNESIUM SERPL-MCNC: 2 MG/DL — SIGNIFICANT CHANGE UP (ref 1.6–2.6)
MAGNESIUM SERPL-MCNC: 2.1 MG/DL — SIGNIFICANT CHANGE UP (ref 1.6–2.6)
MAGNESIUM SERPL-MCNC: 2.2 MG/DL — SIGNIFICANT CHANGE UP (ref 1.6–2.6)
PH UR: 5.5 — SIGNIFICANT CHANGE UP (ref 5–8)
PH UR: 7 — SIGNIFICANT CHANGE UP (ref 5–8)
PHOSPHATE SERPL-MCNC: 4.9 MG/DL — HIGH (ref 2.5–4.5)
PHOSPHATE SERPL-MCNC: 4.9 MG/DL — HIGH (ref 2.5–4.5)
PHOSPHATE SERPL-MCNC: 5.8 MG/DL — HIGH (ref 2.5–4.5)
POTASSIUM SERPL-MCNC: 3.7 MMOL/L — SIGNIFICANT CHANGE UP (ref 3.5–5.3)
POTASSIUM SERPL-MCNC: 3.7 MMOL/L — SIGNIFICANT CHANGE UP (ref 3.5–5.3)
POTASSIUM SERPL-MCNC: 4 MMOL/L — SIGNIFICANT CHANGE UP (ref 3.5–5.3)
POTASSIUM SERPL-SCNC: 3.7 MMOL/L — SIGNIFICANT CHANGE UP (ref 3.5–5.3)
POTASSIUM SERPL-SCNC: 3.7 MMOL/L — SIGNIFICANT CHANGE UP (ref 3.5–5.3)
POTASSIUM SERPL-SCNC: 4 MMOL/L — SIGNIFICANT CHANGE UP (ref 3.5–5.3)
PROT ?TM UR-MCNC: 26 MG/DL — HIGH (ref 0–12)
PROT SERPL-MCNC: 6.2 G/DL — SIGNIFICANT CHANGE UP (ref 6–8.3)
PROT/CREAT UR-RTO: 1 RATIO — HIGH (ref 0–0.2)
SARS-COV-2 RNA SPEC QL NAA+PROBE: SIGNIFICANT CHANGE UP
SODIUM SERPL-SCNC: 138 MMOL/L — SIGNIFICANT CHANGE UP (ref 135–145)
SODIUM SERPL-SCNC: 138 MMOL/L — SIGNIFICANT CHANGE UP (ref 135–145)
SODIUM SERPL-SCNC: 141 MMOL/L — SIGNIFICANT CHANGE UP (ref 135–145)
SODIUM UR-SCNC: 71 MMOL/L — SIGNIFICANT CHANGE UP
SPECIMEN SOURCE: SIGNIFICANT CHANGE UP
URATE SERPL-MCNC: 5.4 MG/DL — SIGNIFICANT CHANGE UP (ref 2.5–7)
URATE SERPL-MCNC: 5.8 MG/DL — SIGNIFICANT CHANGE UP (ref 2.5–7)
URATE SERPL-MCNC: 6.1 MG/DL — SIGNIFICANT CHANGE UP (ref 2.5–7)
UUN UR-MCNC: 248 MG/DL — SIGNIFICANT CHANGE UP

## 2022-05-09 PROCEDURE — 99233 SBSQ HOSP IP/OBS HIGH 50: CPT | Mod: GC

## 2022-05-09 PROCEDURE — 99222 1ST HOSP IP/OBS MODERATE 55: CPT | Mod: GC

## 2022-05-09 RX ORDER — HEPARIN SODIUM 5000 [USP'U]/ML
5000 INJECTION INTRAVENOUS; SUBCUTANEOUS EVERY 8 HOURS
Refills: 0 | Status: DISCONTINUED | OUTPATIENT
Start: 2022-05-09 | End: 2022-05-13

## 2022-05-09 RX ORDER — SODIUM CHLORIDE 9 MG/ML
1000 INJECTION, SOLUTION INTRAVENOUS
Refills: 0 | Status: DISCONTINUED | OUTPATIENT
Start: 2022-05-09 | End: 2022-05-10

## 2022-05-09 RX ADMIN — HEPARIN SODIUM 5000 UNIT(S): 5000 INJECTION INTRAVENOUS; SUBCUTANEOUS at 22:14

## 2022-05-09 RX ADMIN — Medication 1: at 09:01

## 2022-05-09 RX ADMIN — PREGABALIN 1000 MICROGRAM(S): 225 CAPSULE ORAL at 12:00

## 2022-05-09 RX ADMIN — SODIUM CHLORIDE 75 MILLILITER(S): 9 INJECTION, SOLUTION INTRAVENOUS at 09:47

## 2022-05-09 RX ADMIN — Medication 25 MILLIGRAM(S): at 05:01

## 2022-05-09 RX ADMIN — DONEPEZIL HYDROCHLORIDE 5 MILLIGRAM(S): 10 TABLET, FILM COATED ORAL at 22:14

## 2022-05-09 RX ADMIN — SODIUM CHLORIDE 150 MILLILITER(S): 9 INJECTION, SOLUTION INTRAVENOUS at 22:15

## 2022-05-09 RX ADMIN — Medication 150 MEQ/KG/HR: at 06:42

## 2022-05-09 RX ADMIN — HEPARIN SODIUM 5000 UNIT(S): 5000 INJECTION INTRAVENOUS; SUBCUTANEOUS at 15:16

## 2022-05-09 RX ADMIN — Medication 1 TABLET(S): at 12:01

## 2022-05-09 RX ADMIN — PANTOPRAZOLE SODIUM 40 MILLIGRAM(S): 20 TABLET, DELAYED RELEASE ORAL at 05:01

## 2022-05-09 RX ADMIN — AMIODARONE HYDROCHLORIDE 200 MILLIGRAM(S): 400 TABLET ORAL at 05:01

## 2022-05-09 RX ADMIN — SODIUM CHLORIDE 150 MILLILITER(S): 9 INJECTION, SOLUTION INTRAVENOUS at 13:52

## 2022-05-09 RX ADMIN — Medication 81 MILLIGRAM(S): at 12:01

## 2022-05-09 RX ADMIN — Medication 3: at 12:55

## 2022-05-09 NOTE — CONSULT NOTE ADULT - SUBJECTIVE AND OBJECTIVE BOX
[facility] DIVISION OF KIDNEY DISEASES AND HYPERTENSION -- [phone]  -- INITIAL CONSULT NOTE  --------------------------------------------------------------------------------  HPI: Patient is an 86 year old female with past medical history of DM2, HLD, IBS, paroxysmal atrial tachycardia and GERD presented to [facility] c/o of b/l LE cramps for 3 weeks and weakness for 1 week.  Found to have SHAZIA and rhabdomyolysis. Nephrology consulted for SHAZIA. On review of labs on [facility], and all scrips, noted to have Scr prior to admission of 1.5 on 4/11/22, Scr was 1.5 also in Dec 2021, and was 1.8 in April 2021. Scr on admission was 6.71 on 5/8/22. Received IV fluids. Scr improved to 6.24 today. Also with initial CK of 62147, improved to 9331 with IV fluids. Pt. endorse being on Telmisartan at home. Patient was seen and examined at bedside. She said she follows with nephrologist Dr. Tomlin. Last seen him Feb 2019 for SHAZIA.     PAST HISTORY  --------------------------------------------------------------------------------  PAST MEDICAL & SURGICAL HISTORY:  HTN - Hypertension    Diabetes mellitus type 2, controlled    IBS (irritable bowel syndrome)    HLD (hyperlipidemia)    H/O: hysterectomy  for cervical dysplasia    FAMILY HISTORY:  No pertinent family history in first degree relatives    PAST SOCIAL HISTORY:    ALLERGIES & MEDICATIONS  --------------------------------------------------------------------------------  Allergies    No Known Allergies    Intolerances    Standing Inpatient Medications  aMIOdarone    Tablet 200 milliGRAM(s) Oral daily  aspirin enteric coated 81 milliGRAM(s) Oral daily  cyanocobalamin 1000 MICROGram(s) Oral daily  dextrose 5%. 1000 milliLiter(s) IV Continuous <Continuous>  dextrose 5%. 1000 milliLiter(s) IV Continuous <Continuous>  dextrose 50% Injectable 25 Gram(s) IV Push once  dextrose 50% Injectable 12.5 Gram(s) IV Push once  dextrose 50% Injectable 25 Gram(s) IV Push once  donepezil 5 milliGRAM(s) Oral at bedtime  glucagon  Injectable 1 milliGRAM(s) IntraMuscular once  heparin   Injectable 5000 Unit(s) SubCutaneous every 8 hours  insulin lispro (ADMELOG) corrective regimen sliding scale   SubCutaneous three times a day before meals  insulin lispro (ADMELOG) corrective regimen sliding scale   SubCutaneous at bedtime  lactated ringers. 1000 milliLiter(s) IV Continuous <Continuous>  metoprolol succinate ER 25 milliGRAM(s) Oral daily  multivitamin 1 Tablet(s) Oral daily  pantoprazole    Tablet 40 milliGRAM(s) Oral before breakfast  sodium bicarbonate  Infusion 0.379 mEq/kG/Hr IV Continuous <Continuous>    PRN Inpatient Medications  dextrose Oral Gel 15 Gram(s) Oral once PRN    REVIEW OF SYSTEMS  --------------------------------------------------------------------------------  Gen: No fevers/chills  Respiratory: No dyspnea, cough  CV: No chest pain  GI: No abdominal pain, diarrhea  : No dysuria, hematuria  MSK: No  edema, ++ b/l LE weakness     All other systems were reviewed and are negative, except as noted.    VITALS/PHYSICAL EXAM  --------------------------------------------------------------------------------  T(C): 36.7 (05-09-22 @ 08:28), Max: 36.7 (05-09-22 @ 08:28)  HR: 60 (05-09-22 @ 08:28) (59 - 68)  BP: 122/61 (05-09-22 @ 08:28) (114/61 - 146/65)  RR: 18 (05-09-22 @ 08:28) (18 - 19)  SpO2: 100% (05-09-22 @ 08:28) (98% - 100%)  Wt(kg): --  Height (cm): 171.4 (05-09-22 @ 08:28)  Weight (kg): 66 (05-09-22 @ 08:28)  BMI (kg/m2): 22.5 (05-09-22 @ 08:28)  BSA (m2): 1.78 (05-09-22 @ 08:28)    05-09-22 @ 07:01  -  05-09-22 @ 14:35  --------------------------------------------------------  IN: 0 mL / OUT: 900 mL / NET: -900 mL    Physical Exam:  	Gen: NAD  	HEENT: MMM  	Pulm: CTA B/L, no crackles or wheezing   	CV: S1S2  	Abd: Soft, +BS   	Ext: No LE edema B/L  	Neuro: Awake  	Skin: Warm and dry    LABS/STUDIES  --------------------------------------------------------------------------------              11.2   7.68  >-----------<  199      [05-08-22 @ 16:49]              34.6     138  |  94  |  88  ----------------------------<  185      [05-09-22 @ 10:12]  3.7   |  27  |  6.24        Ca     8.4     [05-09-22 @ 10:12]      Mg     2.2     [05-09-22 @ 10:12]      Phos  5.8     [05-09-22 @ 10:12]    TPro  6.2  /  Alb  3.4  /  TBili  0.3  /  DBili  x   /  AST  391  /  ALT  233  /  AlkPhos  67  [05-09-22 @ 10:12]    PT/INR: PT 11.1 , INR 0.97       [05-08-22 @ 18:49]  PTT: 25.0       [05-08-22 @ 18:49]    Uric acid 5.4      [05-09-22 @ 10:12]  CK 9331      [05-09-22 @ 10:12]    Creatinine Trend:  SCr 6.24 [05-09 @ 10:12]  SCr 6.42 [05-08 @ 19:49]  SCr 6.71 [05-08 @ 16:49]    Urinalysis - [05-09-22 @ 11:52]      Color  / Appearance  / SG  / pH 7.0      Gluc  / Ketone   / Bili  / Urobili        Blood  / Protein  / Leuk Est  / Nitrite       RBC  / WBC  / Hyaline  / Gran  / Sq Epi  / Non Sq Epi  / Bacteria     Urine Creatinine 25      [05-09-22 @ 13:16]  Urine Protein 26      [05-09-22 @ 13:16]  Urine Sodium 71      [05-09-22 @ 13:16]  Urine Chloride 58      [05-09-22 @ 13:16]    HbA1c 7.3      [10-03-18 @ 07:43]

## 2022-05-09 NOTE — PROGRESS NOTE ADULT - PROBLEM SELECTOR PLAN 7
DVT ppx: heparin SC  Diet: CC/low phos, low K    PT consult once CK downtrends    C/w omeprazole -> pantoprazole for GERD/gastritis DVT ppx: heparin SC  Diet: CC/low phos, low K    PT consult once CK downtrends    C/w omeprazole -> pantoprazole for GERD/gastritis    Pt prefers to update family

## 2022-05-09 NOTE — DISCHARGE NOTE PROVIDER - NSDCFUSCHEDAPPT_GEN_ALL_CORE_FT
Ortiz Walter  [facility] Physician [facility]  Urology [address]  Scheduled Appointment: 06/13/2022     Declines

## 2022-05-09 NOTE — PROGRESS NOTE ADULT - ATTENDING COMMENTS
86F w/ T2DM, HLD, IBS, paroxysmal atrial tachycardia, GERD who presents with BLE cramps x 3 weeks, associated weakness x 1 week.  Has pain when she lifts her legs, walking, or pressing on the gas pedal when driving - mainly in calves, occasionally thighs.  Pain has been worsening. She has had difficulty getting in and out of the car, putting her pants.  Has needed to use cane for past 1 week for difficulty ambulating due to weakness, baseline ambulates without assistance.  About 1 month ago, she had steroid injection for sciatica in R hip after negative MRI (done 4/13).  Per surescripts, she had methylprednisolone dose pack x 6days prescribed on 4/12.  Denies recent exertion, strenuous exercise.   pt is found to have Rhabdomyolysis and SHAZIA , cont with IVF and closely monitor CK and Cr .  Nephro rec is appreciated       Felicia Rangelre   HOspitalist   [phone]

## 2022-05-09 NOTE — CONSULT NOTE ADULT - PROBLEM SELECTOR RECOMMENDATION 9
Pt with SHAZIA on CKD, uncertain etiology, could have component of rhabdo, and ARB use. Exact duration of SHAZIA however unknown. Noted to have Scr prior to admission of 1.5 on 4/11/22, Scr was 1.5 also in Dec 2021, and was 1.8 in April 2021. Scr on admission was 6.71 on 5/8/22. Received IV fluids. Scr improved to 6.24 today. [place] [address], improved to 9331 with IV fluids. UA with pyuria and hematuria. Also noted 1 grams of protein on spot urine TP/CR ratio. Currently on LR at 75 cc/hr. Currently non-oliguric, urine output 900 cc over 24h period. RPGN?    Agree with IV fluids for now. Hold ARB. Continue escobar catheter. Obtain kidney u/s. Send serological work-up for secondary causes of nephrotic syndrome including PLA2R ab, DANIEL, C3, C4, P-ANCA, C-ANCA, Anti-GBM ab, HBsAg, Hep C antibody, HIV, serum immunofixation/serum free light chains and parvovirus PCR. Will need to consider HD if renal failure continues to worsen. Monitor labs and urine output. Avoid NSAIDs, ACEI/ARBS, RCA and nephrotoxins. Dose medications as per eGFR.    If any questions, please feel free to contact me     Juliet Lombardo  Nephrology Fellow  [facility] Pager: [phone]  [facility] Pager: [phone] Pt with SHAZIA on CKD, uncertain etiology, could have component of rhabdo, and ARB use. Exact duration of SHAZIA however unknown. Noted to have Scr prior to admission of 1.5 on 4/11/22, Scr was 1.5 also in Dec 2021, and was 1.8 in April 2021. Scr on admission was 6.71 on 5/8/22. Received IV fluids. Scr improved to 6.24 today. [place] [address], improved to 9331 with IV fluids. UA with pyuria and hematuria. Also noted 1 grams of protein on spot urine TP/CR ratio. Currently on LR at 75 cc/hr. Currently non-oliguric, urine output 900 cc over 24h period. RPGN?    Agree with IV fluids for now. Hold ARB. Continue escobar catheter. Obtain kidney u/s. Send serological work-up for secondary causes of nephrotic syndrome including PLA2R ab, DANIEL, C3, C4, P-ANCA, C-ANCA, Anti-GBM ab, HBsAg, Hep C antibody, HIV, serum immunofixation/serum free light chains and parvovirus PCR. Neurology eval for GBS? given progressive LE weakness b/l. Will need to consider HD if renal failure continues to worsen. Monitor labs and urine output. Avoid NSAIDs, ACEI/ARBS, RCA and nephrotoxins. Dose medications as per eGFR.    If any questions, please feel free to contact me     Juliet Lombardo  Nephrology Fellow  [facility] Pager: [phone]  [facility] Pager: [phone]

## 2022-05-09 NOTE — PROGRESS NOTE ADULT - PROBLEM SELECTOR PLAN 2
Cr 6.71, baseline ~1.45 in 2020.  Likely due to hypovolemia from "third-spacing" due to the influx of extracellular fluid into injured muscles from rhabdo.  +electrolyte derangements: Mag and Phos elevated.  K, Ca wnl. EKG without significant derangements.    - S/p 1L LR.  - Sodium bicarb 150meq/1L D5W 125cc/hr x 12 hours.  Check urine pH in AM.  - monitor CMP/Mg/Phos/uric acid q6h  - Check uric acid, if >8, add allopurinol  - hold home telmisartan, statin   - escobar in place, monitor strict I/Os, avoid nephrotoxins  - if oliguric or electrolyte derangements c/f renal failure, will consult nephrology urgently. Otherwise nephrology consult in AM Cr 6.71, baseline ~1.45 in 2020.  Likely due to hypovolemia from "third-spacing" due to the influx of extracellular fluid into injured muscles from rhabdo.  +electrolyte derangements: Mag and Phos elevated.  K, Ca wnl. EKG without significant derangements.    - S/p 1L LR.  - Sodium bicarb 150meq/1L D5W 125cc/hr x 12 hours.  Check urine pH in AM.  - monitor CMP/Mg/Phos/uric acid q6h  - Check uric acid, if >8, add allopurinol  - hold home telmisartan, statin   - escobar in place, monitor strict I/Os, avoid nephrotoxins  - nephro consult

## 2022-05-09 NOTE — DISCHARGE NOTE PROVIDER - NSDCMRMEDTOKEN_GEN_ALL_CORE_FT
amiodarone 200 mg oral tablet: 1 tab(s) orally once a day  aspirin 81 mg oral delayed release tablet: 1 tab(s) orally once a day  AZELASTINE 0.1% (137 MCG) SPRY:   donepezil 5 mg oral tablet: 1 tab(s) orally once a day (at bedtime)  glipiZIDE 5 mg oral tablet: 1 tab(s) orally once a day  metoprolol succinate 25 mg oral tablet, extended release: 1 tab(s) orally once a day  Multiple Vitamins oral tablet: 1 tab(s) orally once a day  omeprazole 20 mg oral delayed release capsule: 1 cap(s) orally once a day, As Needed  rosuvastatin 20 mg oral tablet: 1 tab(s) orally once a day (at bedtime)  telmisartan 40 mg oral tablet: 1 tab(s) orally once a day  Vitamin B12: orally once a day   aspirin 81 mg oral delayed release tablet: 1 tab(s) orally once a day  AZELASTINE 0.1% (137 MCG) SPRY:   donepezil 5 mg oral tablet: 1 tab(s) orally once a day (at bedtime)  glipiZIDE 5 mg oral tablet: 1 tab(s) orally once a day  metoprolol succinate 25 mg oral tablet, extended release: 1 tab(s) orally once a day  Multiple Vitamins oral tablet: 1 tab(s) orally once a day  omeprazole 20 mg oral delayed release capsule: 1 cap(s) orally once a day, As Needed  Physical Therapy at home: Physical Therapy at Home    Diagnosis: Weakness  ICD-10: R53.1  Rolling walker: Rolling walker    Diagnosis: Weakness  ICD-10: R53.1  Vitamin B12: orally once a day   amiodarone 200 mg oral tablet: 1 tab(s) orally once a day  aspirin 81 mg oral delayed release tablet: 1 tab(s) orally once a day  AZELASTINE 0.1% (137 MCG) SPRY:   donepezil 5 mg oral tablet: 1 tab(s) orally once a day (at bedtime)  glipiZIDE 5 mg oral tablet: 1 tab(s) orally once a day  metoprolol succinate 25 mg oral tablet, extended release: 1 tab(s) orally once a day  Multiple Vitamins oral tablet: 1 tab(s) orally once a day  omeprazole 20 mg oral delayed release capsule: 1 cap(s) orally once a day, As Needed  Physical Therapy at home: Physical Therapy at Home    Diagnosis: Weakness  ICD-10: R53.1  Rolling walker: Rolling walker    Diagnosis: Weakness  ICD-10: R53.1  Vitamin B12: orally once a day

## 2022-05-09 NOTE — DISCHARGE NOTE PROVIDER - HOSPITAL COURSE
HPI:  86F w/ T2DM, HLD, IBS, paroxysmal atrial tachycardia, GERD who presents with BLE cramps x 3 weeks, associated weakness x 1 week.  Has pain when she lifts her legs, walking, or pressing on the gas pedal when driving - mainly in calves, occasionally thighs.  Pain has been worsening. She has had difficulty getting in and out of the car, putting her pants.  Has needed to use cane for past 1 week for difficulty ambulating due to weakness, baseline ambulates without assistance.  About 1 month ago, she had steroid injection for sciatica in R hip after negative MRI (done 4/13).  Per surescripts, she had methylprednisolone dose pack x 6days prescribed on 4/12.  Denies recent exertion, strenuous exercise.  Denies trauma. Reports urine color has been yellow to light yellow. Admits to + urinary frequency. Pt denies fevers, chills, CP, SOB, numbness, tingling, bowel or bladder incontinence, recent falls, headaches, changes in vision, sick contacts, URI symptoms. Denies illicit drugs.     Pt in rhabdo and treated with sodium bicarb and IVF. CPK in the 10,000s and downtrended. Nephro consulted who recommended hydration and renal US that showed _____.     Patient is now hemodynamically stable and medically optimized for discharge home with referrals to appropriate clinics.   HPI:  86F w/ T2DM, HLD, IBS, paroxysmal atrial tachycardia, GERD who presents with BLE cramps x 3 weeks, associated weakness x 1 week.  Has pain when she lifts her legs, walking, or pressing on the gas pedal when driving - mainly in calves, occasionally thighs.  Pain has been worsening. She has had difficulty getting in and out of the car, putting her pants.  Has needed to use cane for past 1 week for difficulty ambulating due to weakness, baseline ambulates without assistance.  About 1 month ago, she had steroid injection for sciatica in R hip after negative MRI (done 4/13).  Per surescripts, she had methylprednisolone dose pack x 6days prescribed on 4/12.  Denies recent exertion, strenuous exercise.  Denies trauma. Reports urine color has been yellow to light yellow. Admits to + urinary frequency. Pt denies fevers, chills, CP, SOB, numbness, tingling, bowel or bladder incontinence, recent falls, headaches, changes in vision, sick contacts, URI symptoms. Denies illicit drugs.     Pt in rhabdo and treated with sodium bicarb and IVF. CPK in the 10,000s and downtrended. Nephro consulted who recommended hydration and renal US that showed b/l simple cysts that have been previously seen in 2019.     Patient is now hemodynamically stable and medically optimized for discharge home with referrals to appropriate clinics.   HPI:  86F w/ T2DM, HLD, IBS, paroxysmal atrial tachycardia, GERD who presents with BLE cramps x 3 weeks, associated weakness x 1 week.  Has pain when she lifts her legs, walking, or pressing on the gas pedal when driving - mainly in calves, occasionally thighs.  Pain has been worsening. She has had difficulty getting in and out of the car, putting her pants.  Has needed to use cane for past 1 week for difficulty ambulating due to weakness, baseline ambulates without assistance.  About 1 month ago, she had steroid injection for sciatica in R hip after negative MRI (done 4/13).  Per surescripts, she had methylprednisolone dose pack x 6days prescribed on 4/12.  Denies recent exertion, strenuous exercise.  Denies trauma. Reports urine color has been yellow to light yellow. Admits to + urinary frequency. Pt denies fevers, chills, CP, SOB, numbness, tingling, bowel or bladder incontinence, recent falls, headaches, changes in vision, sick contacts, URI symptoms. Denies illicit drugs.     Pt in rhabdo and treated with sodium bicarb and IVF. CPK in the 10,000s and downtrended. Nephro consulted who recommended hydration and renal US that showed b/l simple cysts that have been previously seen in 2019. DVT study also done that was negative. CK on dc _____; Cr on dc _____.     Patient is now hemodynamically stable and medically optimized for discharge home with referrals to appropriate clinics.   HPI:  86F w/ T2DM, HLD, IBS, paroxysmal atrial tachycardia, GERD who presents with BLE cramps x 3 weeks, associated weakness x 1 week.  Has pain when she lifts her legs, walking, or pressing on the gas pedal when driving - mainly in calves, occasionally thighs.  Pain has been worsening. She has had difficulty getting in and out of the car, putting her pants.  Has needed to use cane for past 1 week for difficulty ambulating due to weakness, baseline ambulates without assistance.  About 1 month ago, she had steroid injection for sciatica in R hip after negative MRI (done 4/13).  Per surescripts, she had methylprednisolone dose pack x 6days prescribed on 4/12.  Denies recent exertion, strenuous exercise.  Denies trauma. Reports urine color has been yellow to light yellow. Admits to + urinary frequency. Pt denies fevers, chills, CP, SOB, numbness, tingling, bowel or bladder incontinence, recent falls, headaches, changes in vision, sick contacts, URI symptoms. Denies illicit drugs.     Pt in rhabdo and treated with sodium bicarb and IVF. CPK in the 10,000s and downtrended. Nephro consulted who recommended hydration and renal US that showed b/l simple cysts that have been previously seen in 2019. DVT study also done that was negative. CK on dc 3485; Cr on dc 3.39. Pt told to hold statin and ARB at home.     Patient is now hemodynamically stable and medically optimized for discharge home with referrals to appropriate clinics.   HPI:  86F w/ T2DM, HLD, IBS, paroxysmal atrial tachycardia, GERD who presents with BLE cramps x 3 weeks, associated weakness x 1 week.  Has pain when she lifts her legs, walking, or pressing on the gas pedal when driving - mainly in calves, occasionally thighs.  Pain has been worsening. She has had difficulty getting in and out of the car, putting her pants.  Has needed to use cane for past 1 week for difficulty ambulating due to weakness, baseline ambulates without assistance.  About 1 month ago, she had steroid injection for sciatica in R hip after negative MRI (done 4/13).  Per surescripts, she had methylprednisolone dose pack x 6days prescribed on 4/12.  Denies recent exertion, strenuous exercise.  Denies trauma. Reports urine color has been yellow to light yellow. Admits to + urinary frequency. Pt denies fevers, chills, CP, SOB, numbness, tingling, bowel or bladder incontinence, recent falls, headaches, changes in vision, sick contacts, URI symptoms. Denies illicit drugs.     Pt in rhabdo and treated with sodium bicarb and IVF. CPK in the 10,000s and downtrended. Nephro consulted who recommended hydration and renal US that showed b/l simple cysts that have been previously seen in 2019. DVT study also done that was negative. Pt also with elevated LFTs that downtrended to , . CK on dc 3485; Cr on dc 3.39. Pt told to hold statin and ARB at home and f/u with her nephrologist within 1 wk.    Patient is now hemodynamically stable and medically optimized for discharge home with referrals to appropriate clinics.

## 2022-05-09 NOTE — DISCHARGE NOTE PROVIDER - NSDCCPCAREPLAN_GEN_ALL_CORE_FT
PRINCIPAL DISCHARGE DIAGNOSIS  Diagnosis: Rhabdomyolysis  Assessment and Plan of Treatment: You were admitted to the hospital because you were having weakness and leg pain. This was caused by rhabdomyolysis which is excessively breakdown of your muscle. We monitored your blood work and your improved with fluids. This breakdown affected your kidneys so the kidney doctors assessed you and recommended _______. Kidney ultrasound showed _____.   Please follow-up with your primary care doctor.      SECONDARY DISCHARGE DIAGNOSES  Diagnosis: Rhabdomyolysis  Assessment and Plan of Treatment:      PRINCIPAL DISCHARGE DIAGNOSIS  Diagnosis: Rhabdomyolysis  Assessment and Plan of Treatment: You were admitted to the hospital because you were having weakness and leg pain. This was caused by rhabdomyolysis which is excessively breakdown of your muscle. We monitored your blood work and your improved with fluids. This breakdown affected your kidneys so the kidney doctors assessed you and recommended continued treated with IV hydration. Kidney ultrasound showed the bilateral kidney cysts that have been seen on ultrasound in the past.   Please follow-up with your primary care and kidney doctors.       PRINCIPAL DISCHARGE DIAGNOSIS  Diagnosis: Rhabdomyolysis  Assessment and Plan of Treatment: You were admitted to the hospital because you were having weakness and leg pain. This was caused by rhabdomyolysis which is excessively breakdown of your muscle. We monitored your blood work and your improved with fluids. This breakdown affected your kidneys so the kidney doctors assessed you and recommended continued treated with IV hydration. Kidney ultrasound showed the bilateral kidney cysts that have been seen on ultrasound in the past. Your kidney are still healing from the damaging so do not continue taking telmisartan as it affects your kidneys.   Please follow-up with your primary care and kidney doctors.  If you start to experience chest pain, shortness of breath, abdominal pain, nausea, or vomiting please return to the mergency room.        SECONDARY DISCHARGE DIAGNOSES  Diagnosis: Elevated LFTs  Assessment and Plan of Treatment: Your liver enzymes were elevated when you were admitted to the hospital. This is likely secondary to dehydration. Please do not cotninue taking rosuvastatin as it can affect your liver.   Please follow-up with your primary care doctor.     PRINCIPAL DISCHARGE DIAGNOSIS  Diagnosis: Rhabdomyolysis  Assessment and Plan of Treatment: You were admitted to the hospital because you were having weakness and leg pain. This was caused by rhabdomyolysis which is excessively breakdown of your muscle. We monitored your blood work and your improved with fluids. This breakdown affected your kidneys so the kidney doctors assessed you and recommended continued treated with IV hydration. Kidney ultrasound showed the bilateral kidney cysts that have been seen on ultrasound in the past. Your kidney are still healing from the damaging so do NOT continue taking telmisartan as it affects your kidneys.   Please follow-up with your primary care and kidney doctors.  If you start to experience chest pain, shortness of breath, abdominal pain, nausea, or vomiting please return to the mergency room.        SECONDARY DISCHARGE DIAGNOSES  Diagnosis: Elevated LFTs  Assessment and Plan of Treatment: Your liver enzymes were elevated when you were admitted to the hospital. This is likely secondary to dehydration. Please do NOT cotninue taking rosuvastatin as it can affect your liver.   Please follow-up with your primary care doctor.

## 2022-05-09 NOTE — CONSULT NOTE ADULT - ATTENDING COMMENTS
I have seen this patient with the fellow and agree with their assessment and plan. I was physically present for significant portions of the evaluation and management (E/M) service provided.  I agree with the above history, physical, and plan which I have reviewed and edited where appropriate.    Pt has had some CKD and baseline is likely 1.5-1.8 range based on prior labs  Now with severe SHAZIA with Rhabdo. The Shazia is very dispropotionate to the degree of rhabdo  Would rule out other causes as there is hematuria, proteinuria and RBCs in the urine    Would get renal sonogram to rule out obstruction  She does have cysts that needed yearly follow up- sees Dr Ortiz Walter outpatient  GN workup as above till we get fluids on board    LE weakness and numbness is new and progressive. She even fell with this. Please consult Neurology for eval on this.      Venita Valdez MD  Pager   Office     Contact me directly via [employer] Teams     (After 5 pm or on weekends please page the on-call fellow/attending, can check [email or web address] for schedule. Login is ruby el, schedule under [facility] medicine, psych, derm)

## 2022-05-09 NOTE — PATIENT PROFILE ADULT - FALL HARM RISK - HARM RISK INTERVENTIONS

## 2022-05-09 NOTE — DISCHARGE NOTE PROVIDER - NSDCFUADDAPPT_GEN_ALL_CORE_FT
Please follow-up with your primary care and kidney doctor.  Please follow-up with your primary care and kidney doctor.   Please make an appointment with Dr. Tomlin within 1 week.

## 2022-05-09 NOTE — PROGRESS NOTE ADULT - PROBLEM SELECTOR PLAN 1
BLE cramps x 3months and worsening weakness. CK >13K and UA with moderate blood likely myoglobinuria.  Suspect that recent epidural steroid injection +/- methylpred dose pack ~1 month ago may have induced rhabdomyolysis vs. steroid induced myositis.  Another possible source is rosuvastatin.  Metabolic acidosis likely due to rhabdomyolysis, however lactic acid not elevated on VBG.    - S/p 1L LR.  - Given CK< 5k, start sodium bicarb gtt 150meq/1L D5W 150cc/hr x 12 hours to alkalinize urine.  Check urine pH in AM.  - monitor CMP/Mg/Phos/uric acid q6h  - Check BLE Duplex  - hold statin  - Trend CK to downtrend  - Check DANIEL, myoglobin panel to evaluate for other myopathies.  - if oliguric or electrolyte derangements c/f renal failure, will consult nephrology urgently. Otherwise nephrology consult in AM BLE cramps x 3months and worsening weakness. CK >13K and UA with moderate blood likely myoglobinuria.  Suspect that recent epidural steroid injection +/- methylpred dose pack ~1 month ago may have induced rhabdomyolysis vs. steroid induced myositis.  Another possible source is rosuvastatin.  Metabolic acidosis likely due to rhabdomyolysis, however lactic acid not elevated on VBG.    - S/p 1L LR.  - Given CK< 5k, start sodium bicarb gtt 150meq/1L D5W 150cc/hr x 12 hours to alkalinize urine.  Check urine pH in AM.  - monitor CMP/Mg/Phos/uric acid q6h  - Check BLE Duplex  - hold statin  - Trend CK to downtrend until less than 5000  - Check DANIEL, myoglobin panel to evaluate for other myopathies.  - nephro consult BLE cramps x 3months and worsening weakness. CK >13K and UA with moderate blood likely myoglobinuria.  Suspect that recent epidural steroid injection +/- methylpred dose pack ~1 month ago may have induced rhabdomyolysis vs. steroid induced myositis.  Another possible source is rosuvastatin.  Metabolic acidosis likely due to rhabdomyolysis, however lactic acid not elevated on VBG.    - S/p 1L LR.  - Given CK< 5k, start sodium bicarb gtt 150meq/1L D5W 150cc/hr x 12 hours to alkalinize urine.  Check urine pH in AM.  - monitor BMP/Mg/Phos/uric acid q6h  - Check BLE Duplex  - hold statin  - Trend CK to downtrend until less than 5000  - Check DANIEL, myoglobin panel to evaluate for other myopathies.  - nephro consult

## 2022-05-09 NOTE — DISCHARGE NOTE PROVIDER - CARE PROVIDERS DIRECT ADDRESSES
,[email or web address]

## 2022-05-09 NOTE — PATIENT PROFILE ADULT - NSPROHMDIABETBLDGLCUSUAL_GEN_A_NUR
patient made aware of lab results. He verbalized understanding and had no further questions.   unknown

## 2022-05-10 LAB
A1C WITH ESTIMATED AVERAGE GLUCOSE RESULT: 8 % — HIGH (ref 4–5.6)
ALBUMIN SERPL ELPH-MCNC: 2.8 G/DL — LOW (ref 3.3–5)
ALP SERPL-CCNC: 61 U/L — SIGNIFICANT CHANGE UP (ref 40–120)
ALT FLD-CCNC: 191 U/L — HIGH (ref 10–45)
ANION GAP SERPL CALC-SCNC: 13 MMOL/L — SIGNIFICANT CHANGE UP (ref 5–17)
ANION GAP SERPL CALC-SCNC: 15 MMOL/L — SIGNIFICANT CHANGE UP (ref 5–17)
ANION GAP SERPL CALC-SCNC: 15 MMOL/L — SIGNIFICANT CHANGE UP (ref 5–17)
AST SERPL-CCNC: 302 U/L — HIGH (ref 10–40)
BILIRUB SERPL-MCNC: 0.3 MG/DL — SIGNIFICANT CHANGE UP (ref 0.2–1.2)
BUN SERPL-MCNC: 76 MG/DL — HIGH (ref 7–23)
BUN SERPL-MCNC: 78 MG/DL — HIGH (ref 7–23)
BUN SERPL-MCNC: 82 MG/DL — HIGH (ref 7–23)
C3 SERPL-MCNC: 107 MG/DL — SIGNIFICANT CHANGE UP (ref 81–157)
C4 SERPL-MCNC: 38 MG/DL — SIGNIFICANT CHANGE UP (ref 13–39)
C4 SERPL-MCNC: 43 MG/DL — HIGH (ref 13–39)
CALCIUM SERPL-MCNC: 7.9 MG/DL — LOW (ref 8.4–10.5)
CALCIUM SERPL-MCNC: 8 MG/DL — LOW (ref 8.4–10.5)
CALCIUM SERPL-MCNC: 8.3 MG/DL — LOW (ref 8.4–10.5)
CHLORIDE SERPL-SCNC: 100 MMOL/L — SIGNIFICANT CHANGE UP (ref 96–108)
CHLORIDE SERPL-SCNC: 100 MMOL/L — SIGNIFICANT CHANGE UP (ref 96–108)
CHLORIDE SERPL-SCNC: 101 MMOL/L — SIGNIFICANT CHANGE UP (ref 96–108)
CK SERPL-CCNC: 7178 U/L — HIGH (ref 25–170)
CK SERPL-CCNC: 7323 U/L — HIGH (ref 25–170)
CK SERPL-CCNC: 8098 U/L — HIGH (ref 25–170)
CO2 SERPL-SCNC: 25 MMOL/L — SIGNIFICANT CHANGE UP (ref 22–31)
CO2 SERPL-SCNC: 26 MMOL/L — SIGNIFICANT CHANGE UP (ref 22–31)
CO2 SERPL-SCNC: 28 MMOL/L — SIGNIFICANT CHANGE UP (ref 22–31)
CREAT SERPL-MCNC: 5.67 MG/DL — HIGH (ref 0.5–1.3)
CREAT SERPL-MCNC: 5.88 MG/DL — HIGH (ref 0.5–1.3)
CREAT SERPL-MCNC: 5.89 MG/DL — HIGH (ref 0.5–1.3)
EGFR: 7 ML/MIN/1.73M2 — LOW
ESTIMATED AVERAGE GLUCOSE: 183 MG/DL — HIGH (ref 68–114)
GLUCOSE BLDC GLUCOMTR-MCNC: 107 MG/DL — HIGH (ref 70–99)
GLUCOSE BLDC GLUCOMTR-MCNC: 112 MG/DL — HIGH (ref 70–99)
GLUCOSE BLDC GLUCOMTR-MCNC: 117 MG/DL — HIGH (ref 70–99)
GLUCOSE BLDC GLUCOMTR-MCNC: 124 MG/DL — HIGH (ref 70–99)
GLUCOSE SERPL-MCNC: 101 MG/DL — HIGH (ref 70–99)
GLUCOSE SERPL-MCNC: 106 MG/DL — HIGH (ref 70–99)
GLUCOSE SERPL-MCNC: 145 MG/DL — HIGH (ref 70–99)
HBV SURFACE AB SER-ACNC: SIGNIFICANT CHANGE UP
HBV SURFACE AG SER-ACNC: SIGNIFICANT CHANGE UP
HCT VFR BLD CALC: 27 % — LOW (ref 34.5–45)
HCV AB S/CO SERPL IA: 0.09 S/CO — SIGNIFICANT CHANGE UP (ref 0–0.99)
HCV AB SERPL-IMP: SIGNIFICANT CHANGE UP
HCV RNA SPEC NAA+PROBE-LOG IU: SIGNIFICANT CHANGE UP
HCV RNA SPEC NAA+PROBE-LOG IU: SIGNIFICANT CHANGE UP LOGIU/ML
HGB BLD-MCNC: 9.2 G/DL — LOW (ref 11.5–15.5)
HIV 1+2 AB+HIV1 P24 AG SERPL QL IA: SIGNIFICANT CHANGE UP
HIV 1+2 AB+HIV1 P24 AG SERPL QL IA: SIGNIFICANT CHANGE UP
KAPPA LC SER QL IFE: 7.17 MG/DL — HIGH (ref 0.33–1.94)
KAPPA/LAMBDA FREE LIGHT CHAIN RATIO, SERUM: 2.08 RATIO — HIGH (ref 0.26–1.65)
LAMBDA LC SER QL IFE: 3.44 MG/DL — HIGH (ref 0.57–2.63)
MAGNESIUM SERPL-MCNC: 1.7 MG/DL — SIGNIFICANT CHANGE UP (ref 1.6–2.6)
MAGNESIUM SERPL-MCNC: 1.9 MG/DL — SIGNIFICANT CHANGE UP (ref 1.6–2.6)
MAGNESIUM SERPL-MCNC: 1.9 MG/DL — SIGNIFICANT CHANGE UP (ref 1.6–2.6)
MCHC RBC-ENTMCNC: 30.2 PG — SIGNIFICANT CHANGE UP (ref 27–34)
MCHC RBC-ENTMCNC: 34.1 GM/DL — SIGNIFICANT CHANGE UP (ref 32–36)
MCV RBC AUTO: 88.5 FL — SIGNIFICANT CHANGE UP (ref 80–100)
NRBC # BLD: 0 /100 WBCS — SIGNIFICANT CHANGE UP (ref 0–0)
PHOSPHATE SERPL-MCNC: 4.8 MG/DL — HIGH (ref 2.5–4.5)
PHOSPHATE SERPL-MCNC: 5 MG/DL — HIGH (ref 2.5–4.5)
PHOSPHATE SERPL-MCNC: 5 MG/DL — HIGH (ref 2.5–4.5)
PLATELET # BLD AUTO: 223 K/UL — SIGNIFICANT CHANGE UP (ref 150–400)
POTASSIUM SERPL-MCNC: 3.6 MMOL/L — SIGNIFICANT CHANGE UP (ref 3.5–5.3)
POTASSIUM SERPL-SCNC: 3.6 MMOL/L — SIGNIFICANT CHANGE UP (ref 3.5–5.3)
PROT SERPL-MCNC: 5.4 G/DL — LOW (ref 6–8.3)
RBC # BLD: 3.05 M/UL — LOW (ref 3.8–5.2)
RBC # FLD: 13.2 % — SIGNIFICANT CHANGE UP (ref 10.3–14.5)
SODIUM SERPL-SCNC: 140 MMOL/L — SIGNIFICANT CHANGE UP (ref 135–145)
SODIUM SERPL-SCNC: 141 MMOL/L — SIGNIFICANT CHANGE UP (ref 135–145)
SODIUM SERPL-SCNC: 142 MMOL/L — SIGNIFICANT CHANGE UP (ref 135–145)
URATE SERPL-MCNC: 5.4 MG/DL — SIGNIFICANT CHANGE UP (ref 2.5–7)
URATE SERPL-MCNC: 5.6 MG/DL — SIGNIFICANT CHANGE UP (ref 2.5–7)
URATE SERPL-MCNC: 5.8 MG/DL — SIGNIFICANT CHANGE UP (ref 2.5–7)
WBC # BLD: 6.53 K/UL — SIGNIFICANT CHANGE UP (ref 3.8–10.5)
WBC # FLD AUTO: 6.53 K/UL — SIGNIFICANT CHANGE UP (ref 3.8–10.5)

## 2022-05-10 PROCEDURE — 93970 EXTREMITY STUDY: CPT | Mod: 26

## 2022-05-10 PROCEDURE — 99233 SBSQ HOSP IP/OBS HIGH 50: CPT | Mod: GC

## 2022-05-10 PROCEDURE — 99232 SBSQ HOSP IP/OBS MODERATE 35: CPT | Mod: GC

## 2022-05-10 PROCEDURE — 76775 US EXAM ABDO BACK WALL LIM: CPT | Mod: 26

## 2022-05-10 RX ORDER — ACETAMINOPHEN 500 MG
650 TABLET ORAL EVERY 6 HOURS
Refills: 0 | Status: DISCONTINUED | OUTPATIENT
Start: 2022-05-10 | End: 2022-05-13

## 2022-05-10 RX ORDER — SODIUM CHLORIDE 9 MG/ML
1000 INJECTION, SOLUTION INTRAVENOUS
Refills: 0 | Status: DISCONTINUED | OUTPATIENT
Start: 2022-05-10 | End: 2022-05-11

## 2022-05-10 RX ADMIN — Medication 25 MILLIGRAM(S): at 06:48

## 2022-05-10 RX ADMIN — Medication 81 MILLIGRAM(S): at 12:43

## 2022-05-10 RX ADMIN — Medication 1 TABLET(S): at 12:43

## 2022-05-10 RX ADMIN — SODIUM CHLORIDE 200 MILLILITER(S): 9 INJECTION, SOLUTION INTRAVENOUS at 17:25

## 2022-05-10 RX ADMIN — PANTOPRAZOLE SODIUM 40 MILLIGRAM(S): 20 TABLET, DELAYED RELEASE ORAL at 06:48

## 2022-05-10 RX ADMIN — HEPARIN SODIUM 5000 UNIT(S): 5000 INJECTION INTRAVENOUS; SUBCUTANEOUS at 15:15

## 2022-05-10 RX ADMIN — AMIODARONE HYDROCHLORIDE 200 MILLIGRAM(S): 400 TABLET ORAL at 06:48

## 2022-05-10 RX ADMIN — HEPARIN SODIUM 5000 UNIT(S): 5000 INJECTION INTRAVENOUS; SUBCUTANEOUS at 22:24

## 2022-05-10 RX ADMIN — DONEPEZIL HYDROCHLORIDE 5 MILLIGRAM(S): 10 TABLET, FILM COATED ORAL at 22:25

## 2022-05-10 RX ADMIN — Medication 650 MILLIGRAM(S): at 02:10

## 2022-05-10 RX ADMIN — PREGABALIN 1000 MICROGRAM(S): 225 CAPSULE ORAL at 12:43

## 2022-05-10 RX ADMIN — SODIUM CHLORIDE 150 MILLILITER(S): 9 INJECTION, SOLUTION INTRAVENOUS at 13:58

## 2022-05-10 RX ADMIN — Medication 650 MILLIGRAM(S): at 01:38

## 2022-05-10 RX ADMIN — SODIUM CHLORIDE 150 MILLILITER(S): 9 INJECTION, SOLUTION INTRAVENOUS at 09:00

## 2022-05-10 RX ADMIN — HEPARIN SODIUM 5000 UNIT(S): 5000 INJECTION INTRAVENOUS; SUBCUTANEOUS at 06:49

## 2022-05-10 NOTE — DIETITIAN INITIAL EVALUATION ADULT - PERTINENT LABORATORY DATA
05-10    141  |  100  |  78<H>  ----------------------------<  106<H>  3.6   |  28  |  5.89<H>    Ca    8.3<L>      10 May 2022 13:23  Phos  5.0     05-10  Mg     1.9     05-10    TPro  5.4<L>  /  Alb  2.8<L>  /  TBili  0.3  /  DBili  x   /  AST  302<H>  /  ALT  191<H>  /  AlkPhos  61  05-10    05-10 @ 13:23: Na 141, BUN 78<H>, Cr 5.89<H>, <H>, K+ 3.6, Phos 5.0<H>, Mg 1.9, Alk Phos --, ALT/SGPT --, AST/SGOT --, HbA1c --  05-10 @ 05:35: Na 140, BUN 82<H>, Cr 5.88<H>, <H>, K+ 3.6, Phos 5.0<H>, Mg 1.9, Alk Phos 61, ALT/SGPT 191<H>, AST/SGOT 302<H>, HbA1c --  05-09 @ 22:08: Na 138, BUN 86<H>, Cr 6.01<H>, <H>, K+ 3.7, Phos 4.9<H>, Mg 2.0, Alk Phos --, ALT/SGPT --, AST/SGOT --, HbA1c --  05-09 @ 17:07: Na 141, BUN 85<H>, Cr 6.11<H>, <H>, K+ 4.0, Phos 4.9<H>, Mg 2.1, Alk Phos --, ALT/SGPT --, AST/SGOT --, HbA1c --    CAPILLARY BLOOD GLUCOSE    POCT Blood Glucose.: 112 mg/dL (10 May 2022 12:39)  POCT Blood Glucose.: 117 mg/dL (10 May 2022 08:20)  POCT Blood Glucose.: 150 mg/dL (09 May 2022 22:17)  POCT Blood Glucose.: 105 mg/dL (09 May 2022 17:48)    A1C with Estimated Average Glucose Result: 8.0 % (05-10-22 @ 05:36)  A1C with Estimated Average Glucose Result: 8.2 % (05-09-22 @ 10:11)

## 2022-05-10 NOTE — DIETITIAN INITIAL EVALUATION ADULT - NS FNS DIET ORDER
Diet, Consistent Carbohydrate w/Evening Snack:   No Concentrated Potassium  No Concentrated Phosphorus (05-08-22)

## 2022-05-10 NOTE — DIETITIAN INITIAL EVALUATION ADULT - ORAL INTAKE PTA/DIET HISTORY
- Pt endorses fair appetite and PO intake PTA, state was decreased and had wt loss. Reports living with grandson (state he is a renal transplant candidate) and following renal diet PTA.   - Pt confirmed no known food allergies/ food intolerances

## 2022-05-10 NOTE — DIETITIAN INITIAL EVALUATION ADULT - NS FNS WEIGHT CHANGE REASON
- per pt wt loss PTA, related to poor intake in setting of abdominal discomfort - "which ended up related to kidneys not GI"  - pt states UBW ~140 (63.4 kg)pounds-> 131 (59.4 pounds )pounds "in few months" 6% unintentional wt loss   - 59.4-66 (5/9/22)  58.5 (5/7/22)  60.3 (4/20/22)  60.8 (1/20/22)  61 (12/24/21)  ? wt fluctuations

## 2022-05-10 NOTE — PROGRESS NOTE ADULT - PROBLEM SELECTOR PLAN 4
T2DM     - hold glipizide  - low dose correctional T2DM     - hold glipizide  - low dose correctional  - monitor blood Glucose

## 2022-05-10 NOTE — DIETITIAN INITIAL EVALUATION ADULT - OBTAIN CURRENT WEIGHT
Donald pt - pt said she is having some spotting mainly after intercourse. She said she also has some other issues going on that she would like to discuss with you.   yes

## 2022-05-10 NOTE — PROGRESS NOTE ADULT - PROBLEM SELECTOR PLAN 5
- c/w amiodarone 200mg QD  - c/w metoprolol succ 25 QD - c/w amiodarone 200mg QD ( will need monitoring of Thyroid function, LFT and for pulm fibrosis )   - c/w metoprolol succ 25 QD---> monitor HR and BP

## 2022-05-10 NOTE — DIETITIAN INITIAL EVALUATION ADULT - NSFNSNUTRHOMESUPPLEMENTFT_GEN_A_CORE
- Per H&P, PTA supplements/ nutrition related Rx: Vitamin B12, telmisartan, rosuvastatin, omeprazole, Multivitamin, metoprolol, glipiZIDE  - pt reports taking vitamin D, vitamin C and Multivitamin PTA

## 2022-05-10 NOTE — DIETITIAN INITIAL EVALUATION ADULT - PERTINENT MEDS FT
MEDICATIONS  (STANDING):  aMIOdarone    Tablet 200 milliGRAM(s) Oral daily  aspirin enteric coated 81 milliGRAM(s) Oral daily  cyanocobalamin 1000 MICROGram(s) Oral daily  dextrose 5%. 1000 milliLiter(s) (50 mL/Hr) IV Continuous <Continuous>  dextrose 5%. 1000 milliLiter(s) (100 mL/Hr) IV Continuous <Continuous>  dextrose 50% Injectable 25 Gram(s) IV Push once  dextrose 50% Injectable 12.5 Gram(s) IV Push once  dextrose 50% Injectable 25 Gram(s) IV Push once  donepezil 5 milliGRAM(s) Oral at bedtime  glucagon  Injectable 1 milliGRAM(s) IntraMuscular once  heparin   Injectable 5000 Unit(s) SubCutaneous every 8 hours  insulin lispro (ADMELOG) corrective regimen sliding scale   SubCutaneous three times a day before meals  insulin lispro (ADMELOG) corrective regimen sliding scale   SubCutaneous at bedtime  lactated ringers. 1000 milliLiter(s) (200 mL/Hr) IV Continuous <Continuous>  metoprolol succinate ER 25 milliGRAM(s) Oral daily  multivitamin 1 Tablet(s) Oral daily  pantoprazole    Tablet 40 milliGRAM(s) Oral before breakfast  sodium bicarbonate  Infusion 0.379 mEq/kG/Hr (150 mL/Hr) IV Continuous <Continuous>    MEDICATIONS  (PRN):  acetaminophen     Tablet .. 650 milliGRAM(s) Oral every 6 hours PRN Mild Pain (1 - 3)  dextrose Oral Gel 15 Gram(s) Oral once PRN Blood Glucose LESS THAN 70 milliGRAM(s)/deciliter

## 2022-05-10 NOTE — DIETITIAN INITIAL EVALUATION ADULT - PERSON TAUGHT/METHOD
- Provided education on Carbohydrate Consistent diet including sources of carbohydrates, portion sizes, pairing protein with carbohydrates, limiting sugar sweetened beverages in diet and the importance of consistent eating pattern to help optimize glycemic control.   - Provided education on renal diet, education included importance of monitoring intake of foods high in potassium/phosphorous/sodium, review of foods high in these micronutrients as well as alternatives, monitoring fluid intake  - Pt denies having further questions/ concerns about diet and nutrition- made aware RD remains available./verbal instruction/written material/individual instruction/teach back - (Patient repeats in own words)/patient instructed

## 2022-05-10 NOTE — DIETITIAN INITIAL EVALUATION ADULT - LITERATURE/VIDEOS GIVEN
Consistent Carbohydrate Nutrition Therapy, Diabetes Label Reading Nutrition Tips, Low Sodium Nutrition Therapy   Phosphorus Content of Foods, Potassium Content of Foods

## 2022-05-10 NOTE — PROGRESS NOTE ADULT - PROBLEM SELECTOR PLAN 2
Cr 6.71, baseline ~1.45 in 2020.  Likely due to hypovolemia from "third-spacing" due to the influx of extracellular fluid into injured muscles from rhabdo.  +electrolyte derangements: Mag and Phos elevated.  K, Ca wnl. EKG without significant derangements.    - S/p 1L LR.  - s/p sodium bicarb gtt  - monitor CMP/Mg/Phos/uric acid q6h  - Check uric acid, if >8, add allopurinol  - hold home telmisartan, statin   - escobar in place, monitor strict I/Os, avoid nephrotoxins  - nephro consult Cr 6.71, baseline ~1.45 in 2020.  Likely due to hypovolemia from "third-spacing" due to the influx of extracellular fluid into injured muscles from rhabdo.  +electrolyte derangements: Mag and Phos elevated.  K, Ca wnl. EKG without significant derangements.    - S/p 1L LR.  - s/p sodium bicarb gtt  - monitor CMP/Mg/Phos/uric acid q6h  - Check uric acid, if >8, add allopurinol  - hold home telmisartan, statin   - escobar in place, monitor strict I/Os, avoid nephrotoxins  - nephro consult  - pt with hx simple b/l renal cysts on  12/2021 Cr 6.71, baseline ~1.45 in 2020.  Likely due to hypovolemia from "third-spacing" due to the influx of extracellular fluid into injured muscles from rhabdo.  +electrolyte derangements: Mag and Phos elevated.  K, Ca wnl. EKG without significant derangements.    - S/p 1L LR.  - s/p sodium bicarb gtt  - monitor CMP/Mg/Phos/uric acid q6h  - Check uric acid, if >8, add allopurinol  - hold home telmisartan, statin   - escobar in place, monitor strict I/Os, avoid nephrotoxins  - nephro consult  - pt with hx simple b/l renal cysts on  12/2019 Cr 6.71, baseline ~1.45 in 2020.  Likely due to hypovolemia from "third-spacing" due to the influx of extracellular fluid into injured muscles from rhabdo.  +electrolyte derangements: Mag and Phos elevated.  K, Ca wnl. EKG without significant derangements.    - S/p 1L LR.  - s/p sodium bicarb gtt  - monitor CMP/Mg/Phos/uric acid q6h  - Check uric acid, if >8, add allopurinol  - hold home telmisartan, statin   - escobar in place, monitor strict I/Os, avoid nephrotoxins  - nephro consult, rec appreciated   - pt with hx simple b/l renal cysts on US 12/2019  - f/u renal US Cr 6.71, baseline ~1.45 in 2020.  Likely due to hypovolemia from "third-spacing" due to the influx of extracellular fluid into injured muscles from rhabdo.  +electrolyte derangements: Mag and Phos elevated.  K, Ca wnl. EKG without significant derangements.    - S/p 1L LR--> cont IVF  - s/p sodium bicarb gtt  - monitor CMP/Mg/Phos/uric acid q6h  - Check uric acid, if >8, add allopurinol  - hold home telmisartan, statin   - escobar in place, monitor strict I/Os, avoid nephrotoxins  - nephro consult, rec appreciated   - pt with hx simple b/l renal cysts on US 12/2019  - f/u renal US

## 2022-05-10 NOTE — PROGRESS NOTE ADULT - ATTENDING COMMENTS
86F w/ T2DM, HLD, IBS, paroxysmal atrial tachycardia, GERD who presents with BLE cramps x 3 weeks, associated weakness x 1 week.  Has pain when she lifts her legs, walking, or pressing on the gas pedal when driving - mainly in calves, occasionally thighs.  Pain has been worsening. She has had difficulty getting in and out of the car, putting her pants.  Has needed to use cane for past 1 week for difficulty ambulating due to weakness, baseline ambulates without assistance.  About 1 month ago, she had steroid injection for sciatica in R hip after negative MRI (done 4/13).  Per surescripts, she had methylprednisolone dose pack x 6days prescribed on 4/12.  Denies recent exertion, strenuous exercise.   pt is found to have Rhabdomyolysis and SHAZIA , cont with IVF and closely monitor CK and Cr .  Nephro rec is appreciated       Felicia Roselia   HOspitalist   [phone] . 86F w/ T2DM, HLD, IBS, paroxysmal atrial tachycardia, GERD who presents with BLE cramps x 3 weeks, associated weakness x 1 week.  Has pain when she lifts her legs, walking, or pressing on the gas pedal when driving - mainly in calves, occasionally thighs.  Pain has been worsening. She has had difficulty getting in and out of the car, putting her pants.  Has needed to use cane for past 1 week for difficulty ambulating due to weakness, baseline ambulates without assistance.  About 1 month ago, she had steroid injection for sciatica in R hip after negative MRI (done 4/13).  Per surescripts, she had methylprednisolone dose pack x 6days prescribed on 4/12.  Denies recent exertion, strenuous exercise.   pt is found to have Rhabdomyolysis and SHAZIA , cont with IVF and closely monitor CK and Cr .  Nephro rec is appreciated     # Non Traumatic Rhabdomyolysis with associated SHAZIA and increased LFT      causes is mostly multifactorial ( Steroid, Statin, Amiodarone )       cont IVF , please monitor urine output and electrolytes      s/p sodium bicarb gtt      hold statin, monitor on Tele        COnt to  Trend CK      nephro consult is appreciated      MOnitor Cr and might need further work up as per Nephro , f/up renal US   # Leg weakness ( could be sec to myositis )      F/up CK and LFT and Lower ext dopplers ( done f/up results )      Nephro recommends Neuro eval   # DM 2 with DM Neuropathy as per pt on oral hypoglycemic agents at home     A1C >7.5 ( not at goal )     cont to monitor blood glucose , restart ARB once able   Rest as above     Felicia Veloz   HOspitalist   [phone] .

## 2022-05-10 NOTE — DIETITIAN INITIAL EVALUATION ADULT - CONTINUE CURRENT NUTRITION CARE PLAN
- Continue current diet order: consistent CHO , no concentrated phosphorus no concentrated potassium. add low Na diet  - Encourage adequate consumption of meals/supplements to optimize protein-energy intake   - Monitor PO intake and adjust diet as needed/yes

## 2022-05-10 NOTE — PROGRESS NOTE ADULT - PROBLEM SELECTOR PLAN 3
Likely related to hypovolemia from rhabdo.    - S/p 1L LR  - Monitor LFT's Likely related to hypovolemia from rhabdo.    - S/p 1L LR--> cont IVF  - Monitor LFT's

## 2022-05-10 NOTE — DIETITIAN INITIAL EVALUATION ADULT - REASON FOR ADMISSION
Acute renal failure  "86F w/ T2DM, HLD, IBS, paroxysmal atrial tachycardia, GERD, recent sciatica s/p steroid injection and PO steroid who presents with BLE cramps x 3 weeks, associated weakness x 1 week.  Admitted for rhabdomyolysis and SHAZIA. "

## 2022-05-10 NOTE — PROGRESS NOTE ADULT - PROBLEM SELECTOR PLAN 1
BLE cramps x 3months and worsening weakness. CK >13K and UA with moderate blood likely myoglobinuria.  Suspect that recent epidural steroid injection +/- methylpred dose pack ~1 month ago may have induced rhabdomyolysis vs. steroid induced myositis.  Another possible source is rosuvastatin.  Metabolic acidosis likely due to rhabdomyolysis, however lactic acid not elevated on VBG.    - S/p 1L LR.  - s/p sodium bicarb gtt  - monitor BMP/Mg/Phos/uric acid q6h  - Check BLE Duplex  - hold statin  - Trend CK to downtrend until less than 5000  - Check DANIEL, myoglobin panel to evaluate for other myopathies.  - nephro consult BLE cramps x 3months and worsening weakness. CK >13K and UA with moderate blood likely myoglobinuria.  Suspect that recent epidural steroid injection +/- methylpred dose pack ~1 month ago may have induced rhabdomyolysis vs. steroid induced myositisVs Rosuvastatin Vs low likelihood AMiodarone.  Metabolic acidosis likely due to rhabdomyolysis, however lactic acid not elevated on VBG.    - S/p 1L LR--> cont IVF   - s/p sodium bicarb gtt  - monitor BMP/Mg/Phos/uric acid q6h  - F/up BLE Duplex  - hold statin  - Trend CK to downtrend until less than 5000  - Check DANIEL, myoglobin panel to evaluate for other myopathies.  - nephro consult is appreciated

## 2022-05-10 NOTE — DIETITIAN INITIAL EVALUATION ADULT - ADD RECOMMEND
- Will continue to monitor PO intake, weight, labs (K+, Phos, glucose levels), skin, GI status, diet.

## 2022-05-10 NOTE — DIETITIAN INITIAL EVALUATION ADULT - NSFNSGIIOFT_GEN_A_CORE
- Pt denies nausea, vomiting, diarrhea, or constipation.   - Last BM:today "pasty"; not currently ordered for bowel regimen

## 2022-05-10 NOTE — PROGRESS NOTE ADULT - PROBLEM SELECTOR PLAN 7
DVT ppx: heparin SC  Diet: CC/low phos, low K    PT consult once CK downtrends    C/w omeprazole -> pantoprazole for GERD/gastritis    Pt prefers to update family

## 2022-05-11 LAB
ALBUMIN SERPL ELPH-MCNC: 2.6 G/DL — LOW (ref 3.3–5)
ALP SERPL-CCNC: 55 U/L — SIGNIFICANT CHANGE UP (ref 40–120)
ALT FLD-CCNC: 168 U/L — HIGH (ref 10–45)
ANA TITR SER: NEGATIVE — SIGNIFICANT CHANGE UP
ANION GAP SERPL CALC-SCNC: 12 MMOL/L — SIGNIFICANT CHANGE UP (ref 5–17)
ANION GAP SERPL CALC-SCNC: 13 MMOL/L — SIGNIFICANT CHANGE UP (ref 5–17)
ANION GAP SERPL CALC-SCNC: 15 MMOL/L — SIGNIFICANT CHANGE UP (ref 5–17)
ANION GAP SERPL CALC-SCNC: 16 MMOL/L — SIGNIFICANT CHANGE UP (ref 5–17)
AST SERPL-CCNC: 283 U/L — HIGH (ref 10–40)
AUTO DIFF PNL BLD: ABNORMAL
BILIRUB SERPL-MCNC: 0.4 MG/DL — SIGNIFICANT CHANGE UP (ref 0.2–1.2)
BUN SERPL-MCNC: 60 MG/DL — HIGH (ref 7–23)
BUN SERPL-MCNC: 63 MG/DL — HIGH (ref 7–23)
BUN SERPL-MCNC: 69 MG/DL — HIGH (ref 7–23)
BUN SERPL-MCNC: 71 MG/DL — HIGH (ref 7–23)
C-ANCA SER-ACNC: NEGATIVE — SIGNIFICANT CHANGE UP
CALCIUM SERPL-MCNC: 8.1 MG/DL — LOW (ref 8.4–10.5)
CALCIUM SERPL-MCNC: 8.2 MG/DL — LOW (ref 8.4–10.5)
CALCIUM SERPL-MCNC: 8.4 MG/DL — SIGNIFICANT CHANGE UP (ref 8.4–10.5)
CALCIUM SERPL-MCNC: 8.6 MG/DL — SIGNIFICANT CHANGE UP (ref 8.4–10.5)
CHLORIDE SERPL-SCNC: 100 MMOL/L — SIGNIFICANT CHANGE UP (ref 96–108)
CHLORIDE SERPL-SCNC: 103 MMOL/L — SIGNIFICANT CHANGE UP (ref 96–108)
CHLORIDE SERPL-SCNC: 104 MMOL/L — SIGNIFICANT CHANGE UP (ref 96–108)
CHLORIDE SERPL-SCNC: 104 MMOL/L — SIGNIFICANT CHANGE UP (ref 96–108)
CK SERPL-CCNC: 6214 U/L — HIGH (ref 25–170)
CK SERPL-CCNC: 6353 U/L — HIGH (ref 25–170)
CK SERPL-CCNC: 6478 U/L — HIGH (ref 25–170)
CK SERPL-CCNC: 6510 U/L — HIGH (ref 25–170)
CO2 SERPL-SCNC: 24 MMOL/L — SIGNIFICANT CHANGE UP (ref 22–31)
CO2 SERPL-SCNC: 25 MMOL/L — SIGNIFICANT CHANGE UP (ref 22–31)
CO2 SERPL-SCNC: 26 MMOL/L — SIGNIFICANT CHANGE UP (ref 22–31)
CO2 SERPL-SCNC: 27 MMOL/L — SIGNIFICANT CHANGE UP (ref 22–31)
CREAT SERPL-MCNC: 4.44 MG/DL — HIGH (ref 0.5–1.3)
CREAT SERPL-MCNC: 4.73 MG/DL — HIGH (ref 0.5–1.3)
CREAT SERPL-MCNC: 5.23 MG/DL — HIGH (ref 0.5–1.3)
CREAT SERPL-MCNC: 5.32 MG/DL — HIGH (ref 0.5–1.3)
EGFR: 7 ML/MIN/1.73M2 — LOW
EGFR: 8 ML/MIN/1.73M2 — LOW
EGFR: 8 ML/MIN/1.73M2 — LOW
EGFR: 9 ML/MIN/1.73M2 — LOW
FERRITIN SERPL-MCNC: 181 NG/ML — HIGH (ref 15–150)
FOLATE SERPL-MCNC: 14 NG/ML — SIGNIFICANT CHANGE UP
GLUCOSE BLDC GLUCOMTR-MCNC: 103 MG/DL — HIGH (ref 70–99)
GLUCOSE BLDC GLUCOMTR-MCNC: 118 MG/DL — HIGH (ref 70–99)
GLUCOSE BLDC GLUCOMTR-MCNC: 129 MG/DL — HIGH (ref 70–99)
GLUCOSE BLDC GLUCOMTR-MCNC: 97 MG/DL — SIGNIFICANT CHANGE UP (ref 70–99)
GLUCOSE SERPL-MCNC: 133 MG/DL — HIGH (ref 70–99)
GLUCOSE SERPL-MCNC: 134 MG/DL — HIGH (ref 70–99)
GLUCOSE SERPL-MCNC: 80 MG/DL — SIGNIFICANT CHANGE UP (ref 70–99)
GLUCOSE SERPL-MCNC: 94 MG/DL — SIGNIFICANT CHANGE UP (ref 70–99)
HCT VFR BLD CALC: 26.3 % — LOW (ref 34.5–45)
HGB BLD-MCNC: 9 G/DL — LOW (ref 11.5–15.5)
IRON SATN MFR SERPL: 54 % — HIGH (ref 14–50)
IRON SATN MFR SERPL: 87 UG/DL — SIGNIFICANT CHANGE UP (ref 30–160)
MAGNESIUM SERPL-MCNC: 1.6 MG/DL — SIGNIFICANT CHANGE UP (ref 1.6–2.6)
MAGNESIUM SERPL-MCNC: 1.6 MG/DL — SIGNIFICANT CHANGE UP (ref 1.6–2.6)
MAGNESIUM SERPL-MCNC: 1.7 MG/DL — SIGNIFICANT CHANGE UP (ref 1.6–2.6)
MAGNESIUM SERPL-MCNC: 1.7 MG/DL — SIGNIFICANT CHANGE UP (ref 1.6–2.6)
MCHC RBC-ENTMCNC: 30.4 PG — SIGNIFICANT CHANGE UP (ref 27–34)
MCHC RBC-ENTMCNC: 34.2 GM/DL — SIGNIFICANT CHANGE UP (ref 32–36)
MCV RBC AUTO: 88.9 FL — SIGNIFICANT CHANGE UP (ref 80–100)
MPO AB + PR3 PNL SER: SIGNIFICANT CHANGE UP
NRBC # BLD: 0 /100 WBCS — SIGNIFICANT CHANGE UP (ref 0–0)
P-ANCA SER-ACNC: NEGATIVE — SIGNIFICANT CHANGE UP
PHOSPHATE SERPL-MCNC: 3.5 MG/DL — SIGNIFICANT CHANGE UP (ref 2.5–4.5)
PHOSPHATE SERPL-MCNC: 3.8 MG/DL — SIGNIFICANT CHANGE UP (ref 2.5–4.5)
PHOSPHATE SERPL-MCNC: 4.3 MG/DL — SIGNIFICANT CHANGE UP (ref 2.5–4.5)
PHOSPHATE SERPL-MCNC: 4.6 MG/DL — HIGH (ref 2.5–4.5)
PLATELET # BLD AUTO: 223 K/UL — SIGNIFICANT CHANGE UP (ref 150–400)
POTASSIUM SERPL-MCNC: 3.4 MMOL/L — LOW (ref 3.5–5.3)
POTASSIUM SERPL-MCNC: 3.5 MMOL/L — SIGNIFICANT CHANGE UP (ref 3.5–5.3)
POTASSIUM SERPL-MCNC: 3.8 MMOL/L — SIGNIFICANT CHANGE UP (ref 3.5–5.3)
POTASSIUM SERPL-MCNC: 3.9 MMOL/L — SIGNIFICANT CHANGE UP (ref 3.5–5.3)
POTASSIUM SERPL-SCNC: 3.4 MMOL/L — LOW (ref 3.5–5.3)
POTASSIUM SERPL-SCNC: 3.5 MMOL/L — SIGNIFICANT CHANGE UP (ref 3.5–5.3)
POTASSIUM SERPL-SCNC: 3.8 MMOL/L — SIGNIFICANT CHANGE UP (ref 3.5–5.3)
POTASSIUM SERPL-SCNC: 3.9 MMOL/L — SIGNIFICANT CHANGE UP (ref 3.5–5.3)
PROT SERPL-MCNC: 5.3 G/DL — LOW (ref 6–8.3)
RBC # BLD: 2.96 M/UL — LOW (ref 3.8–5.2)
RBC # FLD: 13.2 % — SIGNIFICANT CHANGE UP (ref 10.3–14.5)
SODIUM SERPL-SCNC: 141 MMOL/L — SIGNIFICANT CHANGE UP (ref 135–145)
SODIUM SERPL-SCNC: 142 MMOL/L — SIGNIFICANT CHANGE UP (ref 135–145)
SODIUM SERPL-SCNC: 143 MMOL/L — SIGNIFICANT CHANGE UP (ref 135–145)
SODIUM SERPL-SCNC: 143 MMOL/L — SIGNIFICANT CHANGE UP (ref 135–145)
TIBC SERPL-MCNC: 161 UG/DL — LOW (ref 220–430)
UIBC SERPL-MCNC: 74 UG/DL — LOW (ref 110–370)
URATE SERPL-MCNC: 5 MG/DL — SIGNIFICANT CHANGE UP (ref 2.5–7)
URATE SERPL-MCNC: 5.1 MG/DL — SIGNIFICANT CHANGE UP (ref 2.5–7)
URATE SERPL-MCNC: 5.2 MG/DL — SIGNIFICANT CHANGE UP (ref 2.5–7)
VIT B12 SERPL-MCNC: >2000 PG/ML — HIGH (ref 232–1245)
WBC # BLD: 6.48 K/UL — SIGNIFICANT CHANGE UP (ref 3.8–10.5)
WBC # FLD AUTO: 6.48 K/UL — SIGNIFICANT CHANGE UP (ref 3.8–10.5)

## 2022-05-11 PROCEDURE — 99233 SBSQ HOSP IP/OBS HIGH 50: CPT | Mod: GC

## 2022-05-11 PROCEDURE — 99232 SBSQ HOSP IP/OBS MODERATE 35: CPT | Mod: GC

## 2022-05-11 RX ORDER — SODIUM CHLORIDE 9 MG/ML
1000 INJECTION, SOLUTION INTRAVENOUS
Refills: 0 | Status: DISCONTINUED | OUTPATIENT
Start: 2022-05-11 | End: 2022-05-12

## 2022-05-11 RX ADMIN — AMIODARONE HYDROCHLORIDE 200 MILLIGRAM(S): 400 TABLET ORAL at 05:14

## 2022-05-11 RX ADMIN — DONEPEZIL HYDROCHLORIDE 5 MILLIGRAM(S): 10 TABLET, FILM COATED ORAL at 21:59

## 2022-05-11 RX ADMIN — HEPARIN SODIUM 5000 UNIT(S): 5000 INJECTION INTRAVENOUS; SUBCUTANEOUS at 22:02

## 2022-05-11 RX ADMIN — Medication 25 MILLIGRAM(S): at 05:15

## 2022-05-11 RX ADMIN — PANTOPRAZOLE SODIUM 40 MILLIGRAM(S): 20 TABLET, DELAYED RELEASE ORAL at 05:14

## 2022-05-11 RX ADMIN — HEPARIN SODIUM 5000 UNIT(S): 5000 INJECTION INTRAVENOUS; SUBCUTANEOUS at 05:15

## 2022-05-11 RX ADMIN — SODIUM CHLORIDE 200 MILLILITER(S): 9 INJECTION, SOLUTION INTRAVENOUS at 11:44

## 2022-05-11 RX ADMIN — HEPARIN SODIUM 5000 UNIT(S): 5000 INJECTION INTRAVENOUS; SUBCUTANEOUS at 14:45

## 2022-05-11 RX ADMIN — Medication 1 TABLET(S): at 11:46

## 2022-05-11 RX ADMIN — PREGABALIN 1000 MICROGRAM(S): 225 CAPSULE ORAL at 11:46

## 2022-05-11 NOTE — PHYSICAL THERAPY INITIAL EVALUATION ADULT - ADDITIONAL COMMENTS
pt lives with family in private home, 1 step to enter. Prior to admission, pt was I with all functional mobility and ADLs without AD. Has recently started using cane due to weakness. Pt endorses h/o falls.

## 2022-05-11 NOTE — PROGRESS NOTE ADULT - PROBLEM SELECTOR PLAN 5
- c/w amiodarone 200mg QD ( will need monitoring of Thyroid function, LFT and for pulm fibrosis )   - c/w metoprolol succ 25 QD---> monitor HR and BP

## 2022-05-11 NOTE — PHYSICAL THERAPY INITIAL EVALUATION ADULT - PRECAUTIONS/LIMITATIONS, REHAB EVAL
CONT: Per surescripts, she had methylprednisolone dose pack x 6days prescribed on 4/12.  Denies recent exertion, strenuous exercise or trauma. Admits to + urinary frequency. Pt denies fevers, chills, CP, SOB, numbness, tingling, bowel or bladder incontinence, recent falls, headaches, changes in vision, sick contacts, URI symptoms. Admitted for rhabdomyolysis and SHAZIA. XRay B/l hip 5/8: Neg. B/l LE Venous Doppler 5/10: Neg. US Renal 5/10:  Bilateral increased echogenicity may be seen in the setting of medical renal disease. Bilateral simple cysts. No hydronephrosis. CONT: Per surescripts, she had methylprednisolone dose pack x 6days prescribed on 4/12.  Denies recent exertion, strenuous exercise or trauma. Admits to + urinary frequency. Pt denies fevers, chills, CP, SOB, numbness, tingling, bowel or bladder incontinence, recent falls, headaches, changes in vision, sick contacts, URI symptoms. Admitted for rhabdomyolysis and SHAZIA. XRay B/l hip 5/8: Neg. B/l LE Venous Doppler 5/10: Neg. US Renal 5/10:  Bilateral increased echogenicity may be seen in the setting of medical renal disease. Bilateral simple cysts. No hydronephrosis./fall precautions

## 2022-05-11 NOTE — PHYSICAL THERAPY INITIAL EVALUATION ADULT - PERTINENT HX OF CURRENT PROBLEM, REHAB EVAL
87 yo F p/w BLE cramps x 3 weeks, associated weakness x 1 week.  Has worsening pain when she lifts her legs, walking, or pressing on the gas pedal when driving - mainly in calves, occasionally thighs.  She has had difficulty getting in and out of the car, putting her pants.  Has needed to use cane for past 1 week for difficulty ambulating due to weakness, baseline ambulates without assistance.  About 1 month ago, she had steroid injection for sciatica in R hip after negative MRI (done 4/13).

## 2022-05-11 NOTE — PHYSICAL THERAPY INITIAL EVALUATION ADULT - GAIT DEVIATIONS NOTED, PT EVAL
decreased angeline/increased time in double stance/decreased velocity of limb motion/decreased stride length/decreased weight-shifting ability

## 2022-05-11 NOTE — PROGRESS NOTE ADULT - PROBLEM SELECTOR PLAN 1
BLE cramps x 3months and worsening weakness. CK >13K and UA with moderate blood likely myoglobinuria.  Suspect that recent epidural steroid injection +/- methylpred dose pack ~1 month ago may have induced rhabdomyolysis vs. steroid induced myositisVs Rosuvastatin Vs low likelihood AMiodarone.  Metabolic acidosis likely due to rhabdomyolysis, however lactic acid not elevated on VBG.    - cont IVF   - s/p sodium bicarb gtt  - monitor BMP/Mg/Phos/uric acid q6h  - BLE Duplex negative for clots  - hold statin  - Trend CK to downtrend until less than 5000  - Check DANIEL, myoglobin panel to evaluate for other myopathies.  - nephro consult is appreciated

## 2022-05-11 NOTE — PROGRESS NOTE ADULT - PROBLEM SELECTOR PLAN 2
Cr 6.71, baseline ~1.45 in 2020.  Likely due to hypovolemia from "third-spacing" due to the influx of extracellular fluid into injured muscles from rhabdo.  +electrolyte derangements: Mag and Phos elevated.  K, Ca wnl. EKG without significant derangements.    - cont IVF  - s/p sodium bicarb gtt  - monitor CMP/Mg/Phos/uric acid q6h  - Check uric acid, if >8, add allopurinol  - hold home telmisartan, statin   - escobar in place, monitor strict I/Os, avoid nephrotoxins  - nephro consult, rec appreciated   - pt with hx simple b/l renal cysts on US 12/2019  - renal US showed b/l increased echogenicity and b/l simple cysts

## 2022-05-12 LAB
ALBUMIN SERPL ELPH-MCNC: 2.8 G/DL — LOW (ref 3.3–5)
ALP SERPL-CCNC: 51 U/L — SIGNIFICANT CHANGE UP (ref 40–120)
ALT FLD-CCNC: 159 U/L — HIGH (ref 10–45)
ANA PAT FLD IF-IMP: SIGNIFICANT CHANGE UP
ANA TITR SER: ABNORMAL
ANION GAP SERPL CALC-SCNC: 12 MMOL/L — SIGNIFICANT CHANGE UP (ref 5–17)
AST SERPL-CCNC: 243 U/L — HIGH (ref 10–40)
BILIRUB SERPL-MCNC: 0.3 MG/DL — SIGNIFICANT CHANGE UP (ref 0.2–1.2)
BUN SERPL-MCNC: 56 MG/DL — HIGH (ref 7–23)
CALCIUM SERPL-MCNC: 8 MG/DL — LOW (ref 8.4–10.5)
CHLORIDE SERPL-SCNC: 105 MMOL/L — SIGNIFICANT CHANGE UP (ref 96–108)
CK SERPL-CCNC: 4469 U/L — HIGH (ref 25–170)
CO2 SERPL-SCNC: 25 MMOL/L — SIGNIFICANT CHANGE UP (ref 22–31)
CREAT SERPL-MCNC: 4.21 MG/DL — HIGH (ref 0.5–1.3)
EGFR: 10 ML/MIN/1.73M2 — LOW
GBM IGG SER-ACNC: 4 — SIGNIFICANT CHANGE UP (ref 0–20)
GLUCOSE BLDC GLUCOMTR-MCNC: 122 MG/DL — HIGH (ref 70–99)
GLUCOSE BLDC GLUCOMTR-MCNC: 140 MG/DL — HIGH (ref 70–99)
GLUCOSE BLDC GLUCOMTR-MCNC: 169 MG/DL — HIGH (ref 70–99)
GLUCOSE BLDC GLUCOMTR-MCNC: 170 MG/DL — HIGH (ref 70–99)
GLUCOSE SERPL-MCNC: 98 MG/DL — SIGNIFICANT CHANGE UP (ref 70–99)
HCT VFR BLD CALC: 24.1 % — LOW (ref 34.5–45)
HGB BLD-MCNC: 8.3 G/DL — LOW (ref 11.5–15.5)
INTERPRETATION SERPL IFE-IMP: SIGNIFICANT CHANGE UP
MAGNESIUM SERPL-MCNC: 1.4 MG/DL — LOW (ref 1.6–2.6)
MCHC RBC-ENTMCNC: 30.7 PG — SIGNIFICANT CHANGE UP (ref 27–34)
MCHC RBC-ENTMCNC: 34.4 GM/DL — SIGNIFICANT CHANGE UP (ref 32–36)
MCV RBC AUTO: 89.3 FL — SIGNIFICANT CHANGE UP (ref 80–100)
NRBC # BLD: 0 /100 WBCS — SIGNIFICANT CHANGE UP (ref 0–0)
PHOSPHATE SERPL-MCNC: 3.4 MG/DL — SIGNIFICANT CHANGE UP (ref 2.5–4.5)
PHOSPHOLIPASE A2 RECEPTOR ELISA: <1.8 RU/ML — SIGNIFICANT CHANGE UP (ref 0–19.9)
PHOSPHOLIPASE A2 RECEPTOR ELISA: <1.8 RU/ML — SIGNIFICANT CHANGE UP (ref 0–19.9)
PLATELET # BLD AUTO: 224 K/UL — SIGNIFICANT CHANGE UP (ref 150–400)
POTASSIUM SERPL-MCNC: 3.7 MMOL/L — SIGNIFICANT CHANGE UP (ref 3.5–5.3)
POTASSIUM SERPL-SCNC: 3.7 MMOL/L — SIGNIFICANT CHANGE UP (ref 3.5–5.3)
PROT SERPL-MCNC: 5.1 G/DL — LOW (ref 6–8.3)
RBC # BLD: 2.7 M/UL — LOW (ref 3.8–5.2)
RBC # FLD: 13.5 % — SIGNIFICANT CHANGE UP (ref 10.3–14.5)
SODIUM SERPL-SCNC: 142 MMOL/L — SIGNIFICANT CHANGE UP (ref 135–145)
URATE SERPL-MCNC: 4.6 MG/DL — SIGNIFICANT CHANGE UP (ref 2.5–7)
WBC # BLD: 6.47 K/UL — SIGNIFICANT CHANGE UP (ref 3.8–10.5)
WBC # FLD AUTO: 6.47 K/UL — SIGNIFICANT CHANGE UP (ref 3.8–10.5)

## 2022-05-12 PROCEDURE — 99232 SBSQ HOSP IP/OBS MODERATE 35: CPT | Mod: GC

## 2022-05-12 PROCEDURE — 99233 SBSQ HOSP IP/OBS HIGH 50: CPT | Mod: GC

## 2022-05-12 RX ORDER — TELMISARTAN 20 MG/1
1 TABLET ORAL
Qty: 0 | Refills: 0 | DISCHARGE

## 2022-05-12 RX ORDER — SODIUM CHLORIDE 9 MG/ML
1000 INJECTION, SOLUTION INTRAVENOUS
Refills: 0 | Status: DISCONTINUED | OUTPATIENT
Start: 2022-05-12 | End: 2022-05-13

## 2022-05-12 RX ORDER — AMIODARONE HYDROCHLORIDE 400 MG/1
1 TABLET ORAL
Qty: 0 | Refills: 0 | DISCHARGE
Start: 2022-05-12

## 2022-05-12 RX ORDER — MAGNESIUM SULFATE 500 MG/ML
1 VIAL (ML) INJECTION ONCE
Refills: 0 | Status: COMPLETED | OUTPATIENT
Start: 2022-05-12 | End: 2022-05-12

## 2022-05-12 RX ADMIN — HEPARIN SODIUM 5000 UNIT(S): 5000 INJECTION INTRAVENOUS; SUBCUTANEOUS at 23:05

## 2022-05-12 RX ADMIN — DONEPEZIL HYDROCHLORIDE 5 MILLIGRAM(S): 10 TABLET, FILM COATED ORAL at 23:05

## 2022-05-12 RX ADMIN — HEPARIN SODIUM 5000 UNIT(S): 5000 INJECTION INTRAVENOUS; SUBCUTANEOUS at 05:22

## 2022-05-12 RX ADMIN — Medication 25 MILLIGRAM(S): at 05:22

## 2022-05-12 RX ADMIN — PREGABALIN 1000 MICROGRAM(S): 225 CAPSULE ORAL at 12:31

## 2022-05-12 RX ADMIN — PANTOPRAZOLE SODIUM 40 MILLIGRAM(S): 20 TABLET, DELAYED RELEASE ORAL at 05:25

## 2022-05-12 RX ADMIN — Medication 1: at 18:03

## 2022-05-12 RX ADMIN — Medication 1 TABLET(S): at 12:31

## 2022-05-12 RX ADMIN — Medication 100 GRAM(S): at 09:43

## 2022-05-12 RX ADMIN — SODIUM CHLORIDE 200 MILLILITER(S): 9 INJECTION, SOLUTION INTRAVENOUS at 09:43

## 2022-05-12 RX ADMIN — HEPARIN SODIUM 5000 UNIT(S): 5000 INJECTION INTRAVENOUS; SUBCUTANEOUS at 15:07

## 2022-05-12 RX ADMIN — AMIODARONE HYDROCHLORIDE 200 MILLIGRAM(S): 400 TABLET ORAL at 05:22

## 2022-05-12 NOTE — PROGRESS NOTE ADULT - PROBLEM SELECTOR PLAN 2
Cr 6.71, baseline ~1.45 in 2020.  Likely due to hypovolemia from "third-spacing" due to the influx of extracellular fluid into injured muscles from rhabdo.  +electrolyte derangements: Mag and Phos elevated.  K, Ca wnl. EKG without significant derangements.    - cont IVF  - s/p sodium bicarb gtt  - monitor CMP/Mg/Phos/uric acid q6h  - Check uric acid, if >8, add allopurinol  - hold home telmisartan, statin   - escobar in place, monitor strict I/Os, avoid nephrotoxins  - nephro consult, rec appreciated   - pt with hx simple b/l renal cysts on US 12/2019  - renal US showed b/l increased echogenicity and b/l simple cysts Cr 6.71, baseline ~1.45 in 2020.  Likely due to hypovolemia from "third-spacing" due to the influx of extracellular fluid into injured muscles from rhabdo.  +electrolyte derangements: Mag and Phos elevated.  K, Ca wnl. EKG without significant derangements.    - cont IVF  - s/p sodium bicarb gtt  - monitor CMP/Mg/Phos/uric acid  - Check uric acid, if >8, add allopurinol  - hold home telmisartan, statin   - escobar in place, monitor strict I/Os, avoid nephrotoxins  - nephro consult, rec appreciated   - pt with hx simple b/l renal cysts on US 12/2019  - renal US showed b/l increased echogenicity and b/l simple cysts

## 2022-05-12 NOTE — PROGRESS NOTE ADULT - ATTENDING COMMENTS
86F w/ T2DM, HLD, IBS, paroxysmal atrial tachycardia, GERD who presents with BLE cramps x 3 weeks, associated weakness x 1 week.  Has pain when she lifts her legs, walking, or pressing on the gas pedal when driving - mainly in calves, occasionally thighs.  Pain has been worsening. She has had difficulty getting in and out of the car, putting her pants.  Has needed to use cane for past 1 week for difficulty ambulating due to weakness, baseline ambulates without assistance.  About 1 month ago, she had steroid injection for sciatica in R hip after negative MRI (done 4/13).  Per surescripts, she had methylprednisolone dose pack x 6days prescribed on 4/12.  Denies recent exertion, strenuous exercise.   pt is found to have Rhabdomyolysis and SHAZIA , cont with IVF and closely monitor CK and Cr .  Nephro rec is appreciated     # Non Traumatic Rhabdomyolysis with associated SHAZIA and increased LFT      causes is mostly multifactorial ( Steroid, Statin, Amiodarone )       cont IVF , please monitor urine output and electrolytes      s/p sodium bicarb gtt      hold statin, monitor on Tele        COnt to  Trend CK and once stable then will monitor daily and will DC the escobar and then monitor on decreased IVF       nephro consult is appreciated      MOnitor Cr and might need further work up as per Nephro , renal US --> Medical renal disease   # Leg weakness ( could be sec to myositis )      F/up CK and LFT which are downtrending with improving symptoms as per patient      Neg Lower ext dopplers for DVT      Nephro recommends Neuro eval   # DM 2 with DM Neuropathy as per pt on oral hypoglycemic agents at home     A1C >7.5 ( not at goal )     cont to monitor blood glucose , restart ARB once able   Rest as above     Felicia Veloz   HOspitalist   [phone] .

## 2022-05-12 NOTE — PROGRESS NOTE ADULT - PROBLEM SELECTOR PLAN 1
BLE cramps x 3months and worsening weakness. CK >13K and UA with moderate blood likely myoglobinuria.  Suspect that recent epidural steroid injection +/- methylpred dose pack ~1 month ago may have induced rhabdomyolysis vs. steroid induced myositisVs Rosuvastatin Vs low likelihood AMiodarone.  Metabolic acidosis likely due to rhabdomyolysis, however lactic acid not elevated on VBG.    - cont IVF   - s/p sodium bicarb gtt  - monitor BMP/Mg/Phos/uric acid q6h  - BLE Duplex negative for clots  - hold statin  - Trend CK to downtrend until less than 5000  - Check DANIEL, myoglobin panel to evaluate for other myopathies.  - nephro consult is appreciated BLE cramps x 3months and worsening weakness. CK >13K and UA with moderate blood likely myoglobinuria.  Suspect that recent epidural steroid injection +/- methylpred dose pack ~1 month ago may have induced rhabdomyolysis vs. steroid induced myositisVs Rosuvastatin Vs low likelihood AMiodarone.  Metabolic acidosis likely due to rhabdomyolysis, however lactic acid not elevated on VBG.    - cont IVF   - s/p sodium bicarb gtt  - monitor BMP/Mg/Phos/uric acid   - BLE Duplex negative for clots  - hold statin  - Trend CK to downtrend until less than 5000  - Check DANIEL, myoglobin panel to evaluate for other myopathies.  - nephro consult is appreciated

## 2022-05-12 NOTE — PROGRESS NOTE ADULT - PROBLEM SELECTOR PLAN 7
DVT ppx: heparin SC  Diet: CC/low phos, low K    PT consult once CK downtrends    C/w omeprazole -> pantoprazole for GERD/gastritis    Pt prefers to update family DVT ppx: heparin SC  Diet: CC/low phos, low K    PT consult rec'd MARY CARMEN but pt wants home PT    C/w omeprazole -> pantoprazole for GERD/gastritis    Pt prefers to update family

## 2022-05-12 NOTE — PROGRESS NOTE ADULT - PROBLEM SELECTOR PLAN 1
Pt with SHAZIA on CKD, likely in setting of rhabdo, and ARB use. Exact duration of SHAZIA however unknown. Noted to have Scr prior to admission of 1.5 on 4/11/22, Scr was 1.5 also in Dec 2021, and was 1.8 in April 2021. Scr on admission was 6.71 on 5/8/22[place] [address], improved to 9331 with IV fluids. UA with pyuria and hematuria. Also noted 1 grams of protein on spot urine TP/CR ratio. Received IV fluids. Currently non-oliguric, urine output is 1.2L over 24h period. Currently with escobar catheter. Kidney u/s with bilateral increased echogenicity may be seen in the setting of medical renal disease and b/l cysts. Serological w/up so far C3 and C4 not low, DANIEL 1:320, ANCAs negative, anti-GBM negative, Hep B, Hep C and HIV negative. Kappa/lambda not high.     Continue IV fluids for today. Hold ARB. D/C escobar catheter. Will follow PLA2R ab, serum immunofixation and parvovirus PCR. Monitor labs and urine output. Unlikely will need kidney bx now that Scr continues to improves. Avoid NSAIDs, ACEI/ARBS, RCA and nephrotoxins. Dose medications as per eGFR.    If any questions, please feel free to contact me     Juliet Lombardo  Nephrology Fellow  [facility] Pager: [phone]  [facility] Pager: [phone].

## 2022-05-12 NOTE — PROGRESS NOTE ADULT - ATTENDING COMMENTS
# SHAZIA - secondary to rhabdomyolysis. Serum creatinine trending down to 4.2. Isabel removed, no issues urinating. Continue IV fluid.     # Medication toxicity monitoring: medication dose reviewed. Please dose medications to CrCl<15    The rest of the recommendations as per fellow's note.    Robyn Pereyra MD  Attending Nephrologist  [phone]

## 2022-05-13 ENCOUNTER — TRANSCRIPTION ENCOUNTER (OUTPATIENT)
Age: 87
End: 2022-05-13

## 2022-05-13 VITALS
DIASTOLIC BLOOD PRESSURE: 53 MMHG | RESPIRATION RATE: 18 BRPM | HEART RATE: 56 BPM | SYSTOLIC BLOOD PRESSURE: 156 MMHG | TEMPERATURE: 98 F | OXYGEN SATURATION: 98 %

## 2022-05-13 LAB
ALBUMIN SERPL ELPH-MCNC: 2.5 G/DL — LOW (ref 3.3–5)
ALP SERPL-CCNC: 50 U/L — SIGNIFICANT CHANGE UP (ref 40–120)
ALT FLD-CCNC: 170 U/L — HIGH (ref 10–45)
ANION GAP SERPL CALC-SCNC: 14 MMOL/L — SIGNIFICANT CHANGE UP (ref 5–17)
AST SERPL-CCNC: 232 U/L — HIGH (ref 10–40)
B19V DNA FLD QL NAA+PROBE: SIGNIFICANT CHANGE UP IU/ML
BILIRUB SERPL-MCNC: 0.2 MG/DL — SIGNIFICANT CHANGE UP (ref 0.2–1.2)
BUN SERPL-MCNC: 44 MG/DL — HIGH (ref 7–23)
CALCIUM SERPL-MCNC: 8.3 MG/DL — LOW (ref 8.4–10.5)
CHLORIDE SERPL-SCNC: 106 MMOL/L — SIGNIFICANT CHANGE UP (ref 96–108)
CK SERPL-CCNC: 3485 U/L — HIGH (ref 25–170)
CO2 SERPL-SCNC: 24 MMOL/L — SIGNIFICANT CHANGE UP (ref 22–31)
CREAT SERPL-MCNC: 3.39 MG/DL — HIGH (ref 0.5–1.3)
EGFR: 13 ML/MIN/1.73M2 — LOW
GLUCOSE BLDC GLUCOMTR-MCNC: 119 MG/DL — HIGH (ref 70–99)
GLUCOSE BLDC GLUCOMTR-MCNC: 134 MG/DL — HIGH (ref 70–99)
GLUCOSE SERPL-MCNC: 115 MG/DL — HIGH (ref 70–99)
HCT VFR BLD CALC: 25.2 % — LOW (ref 34.5–45)
HGB BLD-MCNC: 8.6 G/DL — LOW (ref 11.5–15.5)
MAGNESIUM SERPL-MCNC: 1.5 MG/DL — LOW (ref 1.6–2.6)
MCHC RBC-ENTMCNC: 30.6 PG — SIGNIFICANT CHANGE UP (ref 27–34)
MCHC RBC-ENTMCNC: 34.1 GM/DL — SIGNIFICANT CHANGE UP (ref 32–36)
MCV RBC AUTO: 89.7 FL — SIGNIFICANT CHANGE UP (ref 80–100)
MYOGLOBIN UR-MCNC: HIGH NG/ML (ref 0–13)
NRBC # BLD: 0 /100 WBCS — SIGNIFICANT CHANGE UP (ref 0–0)
PHOSPHATE SERPL-MCNC: 2.4 MG/DL — LOW (ref 2.5–4.5)
PLATELET # BLD AUTO: 240 K/UL — SIGNIFICANT CHANGE UP (ref 150–400)
POTASSIUM SERPL-MCNC: 3.5 MMOL/L — SIGNIFICANT CHANGE UP (ref 3.5–5.3)
POTASSIUM SERPL-SCNC: 3.5 MMOL/L — SIGNIFICANT CHANGE UP (ref 3.5–5.3)
PROT SERPL-MCNC: 5.3 G/DL — LOW (ref 6–8.3)
RBC # BLD: 2.81 M/UL — LOW (ref 3.8–5.2)
RBC # FLD: 13.2 % — SIGNIFICANT CHANGE UP (ref 10.3–14.5)
SODIUM SERPL-SCNC: 144 MMOL/L — SIGNIFICANT CHANGE UP (ref 135–145)
URATE SERPL-MCNC: 3.8 MG/DL — SIGNIFICANT CHANGE UP (ref 2.5–7)
WBC # BLD: 6.67 K/UL — SIGNIFICANT CHANGE UP (ref 3.8–10.5)
WBC # FLD AUTO: 6.67 K/UL — SIGNIFICANT CHANGE UP (ref 3.8–10.5)

## 2022-05-13 PROCEDURE — 99291 CRITICAL CARE FIRST HOUR: CPT

## 2022-05-13 PROCEDURE — 83550 IRON BINDING TEST: CPT

## 2022-05-13 PROCEDURE — 82550 ASSAY OF CK (CPK): CPT

## 2022-05-13 PROCEDURE — 86706 HEP B SURFACE ANTIBODY: CPT

## 2022-05-13 PROCEDURE — 76775 US EXAM ABDO BACK WALL LIM: CPT

## 2022-05-13 PROCEDURE — 83516 IMMUNOASSAY NONANTIBODY: CPT

## 2022-05-13 PROCEDURE — 86235 NUCLEAR ANTIGEN ANTIBODY: CPT

## 2022-05-13 PROCEDURE — 97161 PT EVAL LOW COMPLEX 20 MIN: CPT

## 2022-05-13 PROCEDURE — 85025 COMPLETE CBC W/AUTO DIFF WBC: CPT

## 2022-05-13 PROCEDURE — 83036 HEMOGLOBIN GLYCOSYLATED A1C: CPT

## 2022-05-13 PROCEDURE — 82570 ASSAY OF URINE CREATININE: CPT

## 2022-05-13 PROCEDURE — 96360 HYDRATION IV INFUSION INIT: CPT

## 2022-05-13 PROCEDURE — 86160 COMPLEMENT ANTIGEN: CPT

## 2022-05-13 PROCEDURE — 83521 IG LIGHT CHAINS FREE EACH: CPT

## 2022-05-13 PROCEDURE — 85610 PROTHROMBIN TIME: CPT

## 2022-05-13 PROCEDURE — 82607 VITAMIN B-12: CPT

## 2022-05-13 PROCEDURE — 84156 ASSAY OF PROTEIN URINE: CPT

## 2022-05-13 PROCEDURE — 82553 CREATINE MB FRACTION: CPT

## 2022-05-13 PROCEDURE — 84166 PROTEIN E-PHORESIS/URINE/CSF: CPT

## 2022-05-13 PROCEDURE — 97116 GAIT TRAINING THERAPY: CPT

## 2022-05-13 PROCEDURE — 81001 URINALYSIS AUTO W/SCOPE: CPT

## 2022-05-13 PROCEDURE — 86803 HEPATITIS C AB TEST: CPT

## 2022-05-13 PROCEDURE — 93005 ELECTROCARDIOGRAM TRACING: CPT

## 2022-05-13 PROCEDURE — 83540 ASSAY OF IRON: CPT

## 2022-05-13 PROCEDURE — 82962 GLUCOSE BLOOD TEST: CPT

## 2022-05-13 PROCEDURE — 73522 X-RAY EXAM HIPS BI 3-4 VIEWS: CPT

## 2022-05-13 PROCEDURE — 84100 ASSAY OF PHOSPHORUS: CPT

## 2022-05-13 PROCEDURE — 82436 ASSAY OF URINE CHLORIDE: CPT

## 2022-05-13 PROCEDURE — 85730 THROMBOPLASTIN TIME PARTIAL: CPT

## 2022-05-13 PROCEDURE — 86036 ANCA SCREEN EACH ANTIBODY: CPT

## 2022-05-13 PROCEDURE — 99232 SBSQ HOSP IP/OBS MODERATE 35: CPT | Mod: GC

## 2022-05-13 PROCEDURE — 82435 ASSAY OF BLOOD CHLORIDE: CPT

## 2022-05-13 PROCEDURE — 83986 ASSAY PH BODY FLUID NOS: CPT

## 2022-05-13 PROCEDURE — 82728 ASSAY OF FERRITIN: CPT

## 2022-05-13 PROCEDURE — 76937 US GUIDE VASCULAR ACCESS: CPT

## 2022-05-13 PROCEDURE — 99239 HOSP IP/OBS DSCHRG MGMT >30: CPT

## 2022-05-13 PROCEDURE — 87635 SARS-COV-2 COVID-19 AMP PRB: CPT

## 2022-05-13 PROCEDURE — 82746 ASSAY OF FOLIC ACID SERUM: CPT

## 2022-05-13 PROCEDURE — 84540 ASSAY OF URINE/UREA-N: CPT

## 2022-05-13 PROCEDURE — 83605 ASSAY OF LACTIC ACID: CPT

## 2022-05-13 PROCEDURE — 85014 HEMATOCRIT: CPT

## 2022-05-13 PROCEDURE — 83874 ASSAY OF MYOGLOBIN: CPT

## 2022-05-13 PROCEDURE — 36415 COLL VENOUS BLD VENIPUNCTURE: CPT

## 2022-05-13 PROCEDURE — 85018 HEMOGLOBIN: CPT

## 2022-05-13 PROCEDURE — 97110 THERAPEUTIC EXERCISES: CPT

## 2022-05-13 PROCEDURE — 87389 HIV-1 AG W/HIV-1&-2 AB AG IA: CPT

## 2022-05-13 PROCEDURE — 84550 ASSAY OF BLOOD/URIC ACID: CPT

## 2022-05-13 PROCEDURE — 85027 COMPLETE CBC AUTOMATED: CPT

## 2022-05-13 PROCEDURE — 87522 HEPATITIS C REVRS TRNSCRPJ: CPT

## 2022-05-13 PROCEDURE — 84300 ASSAY OF URINE SODIUM: CPT

## 2022-05-13 PROCEDURE — 86334 IMMUNOFIX E-PHORESIS SERUM: CPT

## 2022-05-13 PROCEDURE — 82947 ASSAY GLUCOSE BLOOD QUANT: CPT

## 2022-05-13 PROCEDURE — 82330 ASSAY OF CALCIUM: CPT

## 2022-05-13 PROCEDURE — 84295 ASSAY OF SERUM SODIUM: CPT

## 2022-05-13 PROCEDURE — 84132 ASSAY OF SERUM POTASSIUM: CPT

## 2022-05-13 PROCEDURE — 82565 ASSAY OF CREATININE: CPT

## 2022-05-13 PROCEDURE — 87086 URINE CULTURE/COLONY COUNT: CPT

## 2022-05-13 PROCEDURE — 80053 COMPREHEN METABOLIC PANEL: CPT

## 2022-05-13 PROCEDURE — 86255 FLUORESCENT ANTIBODY SCREEN: CPT

## 2022-05-13 PROCEDURE — 80048 BASIC METABOLIC PNL TOTAL CA: CPT

## 2022-05-13 PROCEDURE — 83735 ASSAY OF MAGNESIUM: CPT

## 2022-05-13 PROCEDURE — 83520 IMMUNOASSAY QUANT NOS NONAB: CPT

## 2022-05-13 PROCEDURE — 87340 HEPATITIS B SURFACE AG IA: CPT

## 2022-05-13 PROCEDURE — 86038 ANTINUCLEAR ANTIBODIES: CPT

## 2022-05-13 PROCEDURE — 87799 DETECT AGENT NOS DNA QUANT: CPT

## 2022-05-13 PROCEDURE — 82803 BLOOD GASES ANY COMBINATION: CPT

## 2022-05-13 PROCEDURE — 93970 EXTREMITY STUDY: CPT

## 2022-05-13 PROCEDURE — 85379 FIBRIN DEGRADATION QUANT: CPT

## 2022-05-13 RX ORDER — SODIUM,POTASSIUM PHOSPHATES 278-250MG
1 POWDER IN PACKET (EA) ORAL ONCE
Refills: 0 | Status: COMPLETED | OUTPATIENT
Start: 2022-05-13 | End: 2022-05-13

## 2022-05-13 RX ORDER — MAGNESIUM SULFATE 500 MG/ML
2 VIAL (ML) INJECTION ONCE
Refills: 0 | Status: COMPLETED | OUTPATIENT
Start: 2022-05-13 | End: 2022-05-13

## 2022-05-13 RX ADMIN — PREGABALIN 1000 MICROGRAM(S): 225 CAPSULE ORAL at 12:32

## 2022-05-13 RX ADMIN — SODIUM CHLORIDE 100 MILLILITER(S): 9 INJECTION, SOLUTION INTRAVENOUS at 05:07

## 2022-05-13 RX ADMIN — AMIODARONE HYDROCHLORIDE 200 MILLIGRAM(S): 400 TABLET ORAL at 05:07

## 2022-05-13 RX ADMIN — Medication 25 MILLIGRAM(S): at 05:07

## 2022-05-13 RX ADMIN — Medication 1 TABLET(S): at 12:32

## 2022-05-13 RX ADMIN — PANTOPRAZOLE SODIUM 40 MILLIGRAM(S): 20 TABLET, DELAYED RELEASE ORAL at 05:10

## 2022-05-13 RX ADMIN — HEPARIN SODIUM 5000 UNIT(S): 5000 INJECTION INTRAVENOUS; SUBCUTANEOUS at 05:08

## 2022-05-13 RX ADMIN — Medication 1 PACKET(S): at 11:03

## 2022-05-13 RX ADMIN — Medication 25 GRAM(S): at 11:03

## 2022-05-13 RX ADMIN — HEPARIN SODIUM 5000 UNIT(S): 5000 INJECTION INTRAVENOUS; SUBCUTANEOUS at 13:55

## 2022-05-13 NOTE — PROGRESS NOTE ADULT - PROBLEM SELECTOR PLAN 7
DVT ppx: heparin SC  Diet: CC/low phos, low K    PT consult rec'd MARY CARMEN but pt wants home PT    C/w omeprazole -> pantoprazole for GERD/gastritis    Pt prefers to update family

## 2022-05-13 NOTE — PROGRESS NOTE ADULT - PROVIDER SPECIALTY LIST ADULT
Nephrology
Internal Medicine

## 2022-05-13 NOTE — PROGRESS NOTE ADULT - PROBLEM SELECTOR PLAN 1
BLE cramps x 3months and worsening weakness. CK >13K and UA with moderate blood likely myoglobinuria.  Suspect that recent epidural steroid injection +/- methylpred dose pack ~1 month ago may have induced rhabdomyolysis vs. steroid induced myositisVs Rosuvastatin Vs low likelihood AMiodarone.  Metabolic acidosis likely due to rhabdomyolysis, however lactic acid not elevated on VBG.    - cont IVF   - s/p sodium bicarb gtt  - monitor BMP/Mg/Phos/uric acid   - BLE Duplex negative for clots  - hold statin  - Trend CK to downtrend until less than 5000  - Check DANIEL, myoglobin panel to evaluate for other myopathies.  - nephro consult is appreciated BLE cramps x 3months and worsening weakness. CK >13K and UA with moderate blood likely myoglobinuria.  Suspect that recent epidural steroid injection +/- methylpred dose pack ~1 month ago may have induced rhabdomyolysis vs. steroid induced myositisVs Rosuvastatin Vs low likelihood AMiodarone.  Metabolic acidosis likely due to rhabdomyolysis, however lactic acid not elevated on VBG.    - s/p sodium bicarb gtt  - monitor BMP/Mg/Phos/uric acid   - BLE Duplex negative for clots  - hold statin  - Trend CK to downtrend until less than 5000  - Check DANIEL, myoglobin panel to evaluate for other myopathies.  - nephro consult is appreciated

## 2022-05-13 NOTE — PROGRESS NOTE ADULT - ATTENDING COMMENTS
I have seen this patient with the fellow and agree with their assessment and plan. I was physically present for significant portions of the evaluation and management (E/M) service provided.  I agree with the above history, physical, and plan which I have reviewed and edited where appropriate.      SHAZIA resolving  Rhabdo related  Took time given likely ckd  no renal bx indicated  can f.u with Dr Blue Tomlin in 2-3 weeks    Venita Valdez MD  Pager   Office     Contact me directly via [employer] Teams     (After 5 pm or on weekends please page the on-call fellow/attending, can check [email or web address] for schedule. Login is ruby el, schedule under [facility] medicine, psych, derm)    For weekend coverage, call Dr Luc Grbubs( fellow) or Dr Claritza Barber( attending)

## 2022-05-13 NOTE — PROGRESS NOTE ADULT - SUBJECTIVE AND OBJECTIVE BOX
[facility] DIVISION OF KIDNEY DISEASES AND HYPERTENSION -- FOLLOW UP NOTE  --------------------------------------------------------------------------------  Chief Complaint:    24 hour events/subjective:  pt with downtrending crt        PAST HISTORY  --------------------------------------------------------------------------------  No significant changes to PMH, PSH, FHx, SHx, unless otherwise noted    ALLERGIES & MEDICATIONS  --------------------------------------------------------------------------------  Allergies    No Known Allergies    Intolerances      Standing Inpatient Medications  aMIOdarone    Tablet 200 milliGRAM(s) Oral daily  aspirin enteric coated 81 milliGRAM(s) Oral daily  cyanocobalamin 1000 MICROGram(s) Oral daily  dextrose 5%. 1000 milliLiter(s) IV Continuous <Continuous>  dextrose 5%. 1000 milliLiter(s) IV Continuous <Continuous>  dextrose 50% Injectable 25 Gram(s) IV Push once  dextrose 50% Injectable 12.5 Gram(s) IV Push once  dextrose 50% Injectable 25 Gram(s) IV Push once  donepezil 5 milliGRAM(s) Oral at bedtime  glucagon  Injectable 1 milliGRAM(s) IntraMuscular once  heparin   Injectable 5000 Unit(s) SubCutaneous every 8 hours  insulin lispro (ADMELOG) corrective regimen sliding scale   SubCutaneous three times a day before meals  insulin lispro (ADMELOG) corrective regimen sliding scale   SubCutaneous at bedtime  lactated ringers. 1000 milliLiter(s) IV Continuous <Continuous>  metoprolol succinate ER 25 milliGRAM(s) Oral daily  multivitamin 1 Tablet(s) Oral daily  pantoprazole    Tablet 40 milliGRAM(s) Oral before breakfast  sodium bicarbonate  Infusion 0.379 mEq/kG/Hr IV Continuous <Continuous>    PRN Inpatient Medications  acetaminophen     Tablet .. 650 milliGRAM(s) Oral every 6 hours PRN  dextrose Oral Gel 15 Gram(s) Oral once PRN      REVIEW OF SYSTEMS  --------------------------------------------------------------------------------  Gen: No weight changes, fatigue, fevers/chills, weakness  Skin: No rashes  Head/Eyes/Ears/Mouth: No headache; Normal hearing; Normal vision w/o blurriness; No sinus pain/discomfort, sore throat  Respiratory: No dyspnea, cough, wheezing, hemoptysis  CV: No chest pain, PND, orthopnea  GI: No abdominal pain, diarrhea, constipation, nausea, vomiting, melena, hematochezia  : No increased frequency, dysuria, hematuria, nocturia  MSK: No joint pain/swelling; no back pain; no edema  Neuro: No dizziness/lightheadedness, weakness, seizures, numbness, tingling  Heme: No easy bruising or bleeding  Endo: No heat/cold intolerance  Psych: No significant nervousness, anxiety, stress, depression    All other systems were reviewed and are negative, except as noted.    VITALS/PHYSICAL EXAM  --------------------------------------------------------------------------------  T(C): 36.7 (05-10-22 @ 12:19), Max: 36.8 (05-09-22 @ 20:50)  HR: 67 (05-10-22 @ 12:19) (60 - 67)  BP: 150/67 (05-10-22 @ 12:19) (131/53 - 150/67)  RR: 18 (05-10-22 @ 12:19) (18 - 18)  SpO2: 98% (05-10-22 @ 12:19) (96% - 98%)  Wt(kg): --  Height (cm): 171.4 (05-09-22 @ 08:28)  Weight (kg): 66 (05-09-22 @ 08:28)  BMI (kg/m2): 22.5 (05-09-22 @ 08:28)  BSA (m2): 1.78 (05-09-22 @ 08:28)      05-09-22 @ 07:01  -  05-10-22 @ 07:00  --------------------------------------------------------  IN: 3540 mL / OUT: 4300 mL / NET: -760 mL    05-10-22 @ 07:01  -  05-10-22 @ 15:17  --------------------------------------------------------  IN: 120 mL / OUT: 0 mL / NET: 120 mL        LABS/STUDIES  --------------------------------------------------------------------------------              9.2    6.53  >-----------<  223      [05-10-22 @ 05:36]              27.0     141  |  100  |  78  ----------------------------<  106      [05-10-22 @ 13:23]  3.6   |  28  |  5.89        Ca     8.3     [05-10-22 @ 13:23]      Mg     1.9     [05-10-22 @ 13:23]      Phos  5.0     [05-10-22 @ 13:23]    TPro  5.4  /  Alb  2.8  /  TBili  0.3  /  DBili  x   /  AST  302  /  ALT  191  /  AlkPhos  61  [05-10-22 @ 05:35]    PT/INR: PT 11.1 , INR 0.97       [05-08-22 @ 18:49]  PTT: 25.0       [05-08-22 @ 18:49]    Uric acid 5.4      [05-10-22 @ 13:23]  CK 8098      [05-10-22 @ 13:23]    Creatinine Trend:  SCr 5.89 [05-10 @ 13:23]  SCr 5.88 [05-10 @ 05:35]  SCr 6.01 [05-09 @ 22:08]  SCr 6.11 [05-09 @ 17:07]  SCr 6.24 [05-09 @ 10:12]    Urinalysis - [05-09-22 @ 11:52]      Color  / Appearance  / SG  / pH 7.0      Gluc  / Ketone   / Bili  / Urobili        Blood  / Protein  / Leuk Est  / Nitrite       RBC  / WBC  / Hyaline  / Gran  / Sq Epi  / Non Sq Epi  / Bacteria     Urine Creatinine 25      [05-09-22 @ 13:16]  Urine Protein 26      [05-09-22 @ 13:16]  Urine Sodium 71      [05-09-22 @ 13:16]  Urine Urea Nitrogen 248      [05-09-22 @ 13:16]  Urine Chloride 58      [05-09-22 @ 13:16]    HbA1c 7.3      [10-03-18 @ 07:43]    HIV Nonreact      [05-10-22 @ 05:37]    C3 Complement 107      [05-10-22 @ 05:34]  C4 Complement 38      [05-10-22 @ 05:34]  Free Light Chains: kappa 7.17, lambda 3.44, ratio = 2.08      [05-10 @ 05:34]  
[facility] DIVISION OF KIDNEY DISEASES AND HYPERTENSION -- FOLLOW UP NOTE  --------------------------------------------------------------------------------  Chief Complaint:    24 hour events/subjective:  SHAZIA resolving slowly        PAST HISTORY  --------------------------------------------------------------------------------  No significant changes to PMH, PSH, FHx, SHx, unless otherwise noted    ALLERGIES & MEDICATIONS  --------------------------------------------------------------------------------  Allergies    No Known Allergies    Intolerances      Standing Inpatient Medications  aMIOdarone    Tablet 200 milliGRAM(s) Oral daily  aspirin enteric coated 81 milliGRAM(s) Oral daily  cyanocobalamin 1000 MICROGram(s) Oral daily  dextrose 5%. 1000 milliLiter(s) IV Continuous <Continuous>  dextrose 5%. 1000 milliLiter(s) IV Continuous <Continuous>  dextrose 50% Injectable 25 Gram(s) IV Push once  dextrose 50% Injectable 12.5 Gram(s) IV Push once  dextrose 50% Injectable 25 Gram(s) IV Push once  donepezil 5 milliGRAM(s) Oral at bedtime  glucagon  Injectable 1 milliGRAM(s) IntraMuscular once  heparin   Injectable 5000 Unit(s) SubCutaneous every 8 hours  insulin lispro (ADMELOG) corrective regimen sliding scale   SubCutaneous three times a day before meals  insulin lispro (ADMELOG) corrective regimen sliding scale   SubCutaneous at bedtime  lactated ringers. 1000 milliLiter(s) IV Continuous <Continuous>  metoprolol succinate ER 25 milliGRAM(s) Oral daily  multivitamin 1 Tablet(s) Oral daily  pantoprazole    Tablet 40 milliGRAM(s) Oral before breakfast  sodium bicarbonate  Infusion 0.379 mEq/kG/Hr IV Continuous <Continuous>    PRN Inpatient Medications  acetaminophen     Tablet .. 650 milliGRAM(s) Oral every 6 hours PRN  dextrose Oral Gel 15 Gram(s) Oral once PRN      REVIEW OF SYSTEMS  --------------------------------------------------------------------------------  Gen: No weight changes, fatigue, fevers/chills, weakness  Skin: No rashes  Head/Eyes/Ears/Mouth: No headache; Normal hearing; Normal vision w/o blurriness; No sinus pain/discomfort, sore throat  Respiratory: No dyspnea, cough, wheezing, hemoptysis  CV: No chest pain, PND, orthopnea  GI: No abdominal pain, diarrhea, constipation, nausea, vomiting, melena, hematochezia  : No increased frequency, dysuria, hematuria, nocturia  MSK: No joint pain/swelling; no back pain; no edema  Neuro: No dizziness/lightheadedness, weakness, seizures, numbness, tingling  Heme: No easy bruising or bleeding  Endo: No heat/cold intolerance  Psych: No significant nervousness, anxiety, stress, depression    All other systems were reviewed and are negative, except as noted.    VITALS/PHYSICAL EXAM  --------------------------------------------------------------------------------  T(C): 36.8 (05-11-22 @ 04:56), Max: 37 (05-10-22 @ 21:11)  HR: 59 (05-11-22 @ 04:56) (56 - 67)  BP: 150/66 (05-11-22 @ 04:56) (150/66 - 155/66)  RR: 18 (05-11-22 @ 04:56) (18 - 18)  SpO2: 97% (05-11-22 @ 04:56) (96% - 98%)  Wt(kg): --        05-10-22 @ 07:01  -  05-11-22 @ 07:00  --------------------------------------------------------  IN: 2310 mL / OUT: 3300 mL / NET: -990 mL    PHYSICAL EXAM: vital signs as above  in no apparent distress  Neck: Supple, no JVD,    Lungs: no rhonchi, no wheeze, no crackles  CVS: S1 S2 no M/R/G  Abdomen: no tenderness, no organomegaly, BS present  Neuro: Grossly intact  Skin: warm, dry  Ext: no cyanosis or clubbing, no edema        LABS/STUDIES  --------------------------------------------------------------------------------              9.0    6.48  >-----------<  223      [05-11-22 @ 07:21]              26.3     143  |  104  |  69  ----------------------------<  80      [05-11-22 @ 07:19]  3.5   |  24  |  5.32        Ca     8.2     [05-11-22 @ 07:19]      Mg     1.7     [05-11-22 @ 07:19]      Phos  4.3     [05-11-22 @ 07:19]    TPro  5.3  /  Alb  2.6  /  TBili  0.4  /  DBili  x   /  AST  283  /  ALT  168  /  AlkPhos  55  [05-11-22 @ 07:19]        Uric acid 5.2      [05-11-22 @ 01:46]  CK 6214      [05-11-22 @ 07:19]    Creatinine Trend:  SCr 5.32 [05-11 @ 07:19]  SCr 5.23 [05-11 @ 01:46]  SCr 5.67 [05-10 @ 20:03]  SCr 5.89 [05-10 @ 13:23]  SCr 5.88 [05-10 @ 05:35]    Urinalysis - [05-09-22 @ 11:52]      Color  / Appearance  / SG  / pH 7.0      Gluc  / Ketone   / Bili  / Urobili        Blood  / Protein  / Leuk Est  / Nitrite       RBC  / WBC  / Hyaline  / Gran  / Sq Epi  / Non Sq Epi  / Bacteria     Urine Creatinine 25      [05-09-22 @ 13:16]  Urine Protein 26      [05-09-22 @ 13:16]  Urine Sodium 71      [05-09-22 @ 13:16]  Urine Urea Nitrogen 248      [05-09-22 @ 13:16]  Urine Chloride 58      [05-09-22 @ 13:16]    HbA1c 7.3      [10-03-18 @ 07:43]    HBsAb Nonreact      [05-10-22 @ 05:37]  HBsAg Nonreact      [05-10-22 @ 05:37]  HCV 0.09, Nonreact      [05-10-22 @ 05:37]  HIV Nonreact      [05-10-22 @ 13:23]    DANIEL: titer Negative, pattern --      [05-09-22 @ 10:12]  C3 Complement 107      [05-10-22 @ 05:34]  C4 Complement 43      [05-10-22 @ 13:23]  Free Light Chains: kappa 7.17, lambda 3.44, ratio = 2.08      [05-10 @ 05:34]  
[facility] DIVISION OF KIDNEY DISEASES AND HYPERTENSION -- FOLLOW UP NOTE  --------------------------------------------------------------------------------  HPI: Patient is an 86 year old female with past medical history of DM2, HLD, IBS, paroxysmal atrial tachycardia and GERD presented to [facility] c/o of b/l LE cramps for 3 weeks and weakness for 1 week.  Found to have SHAZIA and rhabdomyolysis. Nephrology consulted for SHAZIA. [facility], and all scrips, noted to have Scr prior to admission of 1.5 on 4/11/22, Scr was 1.5 also in Dec 2021, and was 1.8 in April 2021. Scr on admission was 6.71 on 5/8/22. Received IV fluids. [place] [address]. Pt. endorse being on Telmisartan at home. Patient was seen and examined at bedside. She said she follows with nephrologist Dr. Tomlin. Last seen him Feb 2019 for SHAZIA.    Scr improved to 3.39 today, with CK of 3485.    Patient was seen and examined at bedside. Reported feeling well. Denies CP, SOB, fever, chills, nausea, vomiting, diarrhea, LE edema or dysuria.    PAST HISTORY  --------------------------------------------------------------------------------  No significant changes to PMH, PSH, FHx, SHx, unless otherwise noted    ALLERGIES & MEDICATIONS  --------------------------------------------------------------------------------  Allergies    No Known Allergies    Intolerances    Standing Inpatient Medications  aMIOdarone    Tablet 200 milliGRAM(s) Oral daily  cyanocobalamin 1000 MICROGram(s) Oral daily  dextrose 5%. 1000 milliLiter(s) IV Continuous <Continuous>  dextrose 5%. 1000 milliLiter(s) IV Continuous <Continuous>  dextrose 50% Injectable 25 Gram(s) IV Push once  dextrose 50% Injectable 12.5 Gram(s) IV Push once  dextrose 50% Injectable 25 Gram(s) IV Push once  donepezil 5 milliGRAM(s) Oral at bedtime  glucagon  Injectable 1 milliGRAM(s) IntraMuscular once  heparin   Injectable 5000 Unit(s) SubCutaneous every 8 hours  insulin lispro (ADMELOG) corrective regimen sliding scale   SubCutaneous three times a day before meals  insulin lispro (ADMELOG) corrective regimen sliding scale   SubCutaneous at bedtime  metoprolol succinate ER 25 milliGRAM(s) Oral daily  multivitamin 1 Tablet(s) Oral daily  pantoprazole    Tablet 40 milliGRAM(s) Oral before breakfast  sodium bicarbonate  Infusion 0.379 mEq/kG/Hr IV Continuous <Continuous>    PRN Inpatient Medications  acetaminophen     Tablet .. 650 milliGRAM(s) Oral every 6 hours PRN  dextrose Oral Gel 15 Gram(s) Oral once PRN    REVIEW OF SYSTEMS  --------------------------------------------------------------------------------  Gen: No fevers/chills  Respiratory: No dyspnea, cough  CV: No chest pain  GI: No abdominal pain, diarrhea  : No dysuria, hematuria  MSK: No  edema    All other systems were reviewed and are negative, except as noted.    VITALS/PHYSICAL EXAM  --------------------------------------------------------------------------------  T(C): 36.7 (05-13-22 @ 11:26), Max: 37.4 (05-12-22 @ 13:09)  HR: 56 (05-13-22 @ 11:26) (56 - 65)  BP: 156/53 (05-13-22 @ 11:26) (137/60 - 156/53)  RR: 18 (05-13-22 @ 11:26) (18 - 18)  SpO2: 98% (05-13-22 @ 11:26) (96% - 99%)  Wt(kg): -    05-12-22 @ 07:01  -  05-13-22 @ 07:00  --------------------------------------------------------  IN: 480 mL / OUT: 600 mL / NET: -120 mL    Physical Exam:  	Gen: NAD  	HEENT: MMM  	Pulm: CTA B/L  	CV: S1S2  	Abd: Soft, +BS   	Ext: No LE edema B/L  	Neuro: Awake  	Skin: Warm and dry    LABS/STUDIES  --------------------------------------------------------------------------------              8.6    6.67  >-----------<  240      [05-13-22 @ 07:12]              25.2     144  |  106  |  44  ----------------------------<  115      [05-13-22 @ 07:15]  3.5   |  24  |  3.39        Ca     8.3     [05-13-22 @ 07:15]      Mg     1.5     [05-13-22 @ 07:15]      Phos  2.4     [05-13-22 @ 07:15]    TPro  5.3  /  Alb  2.5  /  TBili  0.2  /  DBili  x   /  AST  232  /  ALT  170  /  AlkPhos  50  [05-13-22 @ 07:15]    Uric acid 3.8      [05-13-22 @ 07:15]  CK 3485      [05-13-22 @ 07:15]    Creatinine Trend:  SCr 3.39 [05-13 @ 07:15]  SCr 4.21 [05-12 @ 07:12]  SCr 4.44 [05-11 @ 20:32]  SCr 4.73 [05-11 @ 15:13]  SCr 5.32 [05-11 @ 07:19]    Urinalysis - [05-09-22 @ 11:52]      Color  / Appearance  / SG  / pH 7.0      Gluc  / Ketone   / Bili  / Urobili        Blood  / Protein  / Leuk Est  / Nitrite       RBC  / WBC  / Hyaline  / Gran  / Sq Epi  / Non Sq Epi  / Bacteria     Urine Creatinine 25      [05-09-22 @ 13:16]  Urine Protein 26      [05-09-22 @ 13:16]  Urine Sodium 71      [05-09-22 @ 13:16]  Urine Urea Nitrogen 248      [05-09-22 @ 13:16]  Urine Chloride 58      [05-09-22 @ 13:16]    Iron 87, TIBC 161, %sat 54      [05-11-22 @ 07:19]  Ferritin 181      [05-11-22 @ 07:22]  HbA1c 7.3      [10-03-18 @ 07:43]    HBsAb Nonreact      [05-10-22 @ 05:37]  HBsAg Nonreact      [05-10-22 @ 05:37]  HCV 0.09, Nonreact      [05-10-22 @ 05:37]  HIV Nonreact      [05-10-22 @ 13:23]    DANIEL: titer 1:320, pattern DFS70      [05-10-22 @ 05:37]  C3 Complement 107      [05-10-22 @ 05:34]  C4 Complement 43      [05-10-22 @ 13:23]  ANCA: cANCA Negative, pANCA Negative, atypical ANCA Indeterminate Method interference due to DANIEL Fluorescence      [05-10-22 @ 05:37]  anti-GBM 4      [05-10-22 @ 05:34]  PLA2R: RYAN <1.8, IFA --      [05-10-22 @ 13:20]  Free Light Chains: kappa 7.17, lambda 3.44, ratio = 2.08      [05-10 @ 05:34]  Immunofixation Serum:   No Monoclonal Band Identified    Reference Range: None Detected      [05-10-22 @ 05:34]  
[facility] DIVISION OF KIDNEY DISEASES AND HYPERTENSION -- FOLLOW UP NOTE  --------------------------------------------------------------------------------  HPI: Patient is an 86 year old female with past medical history of DM2, HLD, IBS, paroxysmal atrial tachycardia and GERD presented to [facility] c/o of b/l LE cramps for 3 weeks and weakness for 1 week.  Found to have SHAZIA and rhabdomyolysis. Nephrology consulted for SHAZIA. On review of labs on [facility], and all scrips, noted to have Scr prior to admission of 1.5 on 4/11/22, Scr was 1.5 also in Dec 2021, and was 1.8 in April 2021. Scr on admission was 6.71 on 5/8/22. Received IV fluids. [place] [address]. Pt. endorse being on Telmisartan at home. Patient was seen and examined at bedside. She said she follows with nephrologist Dr. Tomlin. Last seen him Feb 2019 for SHAZIA.    Scr improved to 4.21 today, with CK of 4469.     Patient was seen and examined at bedside. Reported feeling well. Denies CP, SOB, fever, chills, nausea, vomiting, diarrhea, LE edema or dysuria.    PAST HISTORY  --------------------------------------------------------------------------------  No significant changes to PMH, PSH, FHx, SHx, unless otherwise noted    ALLERGIES & MEDICATIONS  --------------------------------------------------------------------------------  Allergies    No Known Allergies    Intolerances    Standing Inpatient Medications  aMIOdarone    Tablet 200 milliGRAM(s) Oral daily  cyanocobalamin 1000 MICROGram(s) Oral daily  dextrose 5%. 1000 milliLiter(s) IV Continuous <Continuous>  dextrose 5%. 1000 milliLiter(s) IV Continuous <Continuous>  dextrose 50% Injectable 25 Gram(s) IV Push once  dextrose 50% Injectable 12.5 Gram(s) IV Push once  dextrose 50% Injectable 25 Gram(s) IV Push once  donepezil 5 milliGRAM(s) Oral at bedtime  glucagon  Injectable 1 milliGRAM(s) IntraMuscular once  heparin   Injectable 5000 Unit(s) SubCutaneous every 8 hours  insulin lispro (ADMELOG) corrective regimen sliding scale   SubCutaneous three times a day before meals  insulin lispro (ADMELOG) corrective regimen sliding scale   SubCutaneous at bedtime  lactated ringers. 1000 milliLiter(s) IV Continuous <Continuous>  metoprolol succinate ER 25 milliGRAM(s) Oral daily  multivitamin 1 Tablet(s) Oral daily  pantoprazole    Tablet 40 milliGRAM(s) Oral before breakfast  sodium bicarbonate  Infusion 0.379 mEq/kG/Hr IV Continuous <Continuous>    PRN Inpatient Medications  acetaminophen     Tablet .. 650 milliGRAM(s) Oral every 6 hours PRN  dextrose Oral Gel 15 Gram(s) Oral once PRN    REVIEW OF SYSTEMS  --------------------------------------------------------------------------------  Gen: No fevers/chills  Respiratory: No dyspnea, cough  CV: No chest pain  GI: No abdominal pain, diarrhea  : No hematuria  MSK: No  edema    All other systems were reviewed and are negative, except as noted.    VITALS/PHYSICAL EXAM  --------------------------------------------------------------------------------  T(C): 37.3 (05-12-22 @ 04:53), Max: 37.3 (05-12-22 @ 04:53)  HR: 62 (05-12-22 @ 04:53) (56 - 62)  BP: 138/86 (05-12-22 @ 04:53) (135/72 - 158/60)  RR: 17 (05-12-22 @ 04:53) (17 - 18)  SpO2: 96% (05-12-22 @ 04:53) (96% - 99%)  Wt(kg): --    05-11-22 @ 07:01  -  05-12-22 @ 07:00  --------------------------------------------------------  IN: 3120 mL / OUT: 2300 mL / NET: 820 mL    Physical Exam:  	Gen: NAD  	HEENT: MMM  	Pulm: CTA B/L  	CV: S1S2  	Abd: Soft, +BS   	Ext: No LE edema B/L  	Neuro: Awake  	Skin: Warm and dry  	: escobar catheter with clear yellow urine     LABS/STUDIES  --------------------------------------------------------------------------------              8.3    6.47  >-----------<  224      [05-12-22 @ 07:12]              24.1     142  |  105  |  56  ----------------------------<  98      [05-12-22 @ 07:12]  3.7   |  25  |  4.21        Ca     8.0     [05-12-22 @ 07:12]      Mg     1.4     [05-12-22 @ 07:12]      Phos  3.4     [05-12-22 @ 07:12]    TPro  5.1  /  Alb  2.8  /  TBili  0.3  /  DBili  x   /  AST  243  /  ALT  159  /  AlkPhos  51  [05-12-22 @ 07:12]    Uric acid 4.6      [05-12-22 @ 07:12]  CK 4469      [05-12-22 @ 07:12]    Creatinine Trend:  SCr 4.21 [05-12 @ 07:12]  SCr 4.44 [05-11 @ 20:32]  SCr 4.73 [05-11 @ 15:13]  SCr 5.32 [05-11 @ 07:19]  SCr 5.23 [05-11 @ 01:46]    Urinalysis - [05-09-22 @ 11:52]      Color  / Appearance  / SG  / pH 7.0      Gluc  / Ketone   / Bili  / Urobili        Blood  / Protein  / Leuk Est  / Nitrite       RBC  / WBC  / Hyaline  / Gran  / Sq Epi  / Non Sq Epi  / Bacteria     Urine Creatinine 25      [05-09-22 @ 13:16]  Urine Protein 26      [05-09-22 @ 13:16]  Urine Sodium 71      [05-09-22 @ 13:16]  Urine Urea Nitrogen 248      [05-09-22 @ 13:16]  Urine Chloride 58      [05-09-22 @ 13:16]    Iron 87, TIBC 161, %sat 54      [05-11-22 @ 07:19]  Ferritin 181      [05-11-22 @ 07:22]  HbA1c 7.3      [10-03-18 @ 07:43]    HBsAb Nonreact      [05-10-22 @ 05:37]  HBsAg Nonreact      [05-10-22 @ 05:37]  HCV 0.09, Nonreact      [05-10-22 @ 05:37]  HIV Nonreact      [05-10-22 @ 13:23]    DANIEL: titer 1:320, pattern DFS70      [05-10-22 @ 05:37]  C3 Complement 107      [05-10-22 @ 05:34]  C4 Complement 43      [05-10-22 @ 13:23]  ANCA: cANCA Negative, pANCA Negative, atypical ANCA Indeterminate Method interference due to DANIEL Fluorescence      [05-10-22 @ 05:37]  anti-GBM 4      [05-10-22 @ 05:34]  Free Light Chains: kappa 7.17, lambda 3.44, ratio = 2.08      [05-10 @ 05:34]  
Diane Murphy | PGY-1  Internal Medicine    OVERNIGHT EVENTS: no overnight events      SUBJECTIVE: Patient was examined at bedside this morning. Appeared comfortable. Overnight vitals and monitoring results were reviewed. Reporting no complaints. Denied chest pain, SOB, abdominal pain, vomiting, diarrhea, constipation.       MEDICATIONS  (STANDING):  aMIOdarone    Tablet 200 milliGRAM(s) Oral daily  cyanocobalamin 1000 MICROGram(s) Oral daily  dextrose 5%. 1000 milliLiter(s) (50 mL/Hr) IV Continuous <Continuous>  dextrose 5%. 1000 milliLiter(s) (100 mL/Hr) IV Continuous <Continuous>  dextrose 50% Injectable 25 Gram(s) IV Push once  dextrose 50% Injectable 12.5 Gram(s) IV Push once  dextrose 50% Injectable 25 Gram(s) IV Push once  donepezil 5 milliGRAM(s) Oral at bedtime  glucagon  Injectable 1 milliGRAM(s) IntraMuscular once  heparin   Injectable 5000 Unit(s) SubCutaneous every 8 hours  insulin lispro (ADMELOG) corrective regimen sliding scale   SubCutaneous three times a day before meals  insulin lispro (ADMELOG) corrective regimen sliding scale   SubCutaneous at bedtime  lactated ringers. 1000 milliLiter(s) (100 mL/Hr) IV Continuous <Continuous>  metoprolol succinate ER 25 milliGRAM(s) Oral daily  multivitamin 1 Tablet(s) Oral daily  pantoprazole    Tablet 40 milliGRAM(s) Oral before breakfast  sodium bicarbonate  Infusion 0.379 mEq/kG/Hr (150 mL/Hr) IV Continuous <Continuous>    MEDICATIONS  (PRN):  acetaminophen     Tablet .. 650 milliGRAM(s) Oral every 6 hours PRN Mild Pain (1 - 3)  dextrose Oral Gel 15 Gram(s) Oral once PRN Blood Glucose LESS THAN 70 milliGRAM(s)/deciliter        T(F): 98 (05-13-22 @ 05:00), Max: 99.4 (05-12-22 @ 13:09)  HR: 64 (05-13-22 @ 05:00) (62 - 65)  BP: 142/62 (05-13-22 @ 05:00) (137/60 - 142/62)  BP(mean): --  RR: 18 (05-13-22 @ 05:00) (18 - 18)  SpO2: 96% (05-13-22 @ 05:00) (96% - 99%)    PHYSICAL EXAM:     Gen: Alert, well-developed, NAD  HEENT: NCAT, EOMI, clear conjunctiva, no scleral icterus, no erythema or exudates in the oropharynx, mmm  Neck: Supple  CV: RRR, S1S2  Resp: Normal respiratory effort. CTA  Abd: Soft, NT, ND  : escobar in place draining light yellow urine  Ext: Mild TTP in calves R>L and in thighs. Mild ecchymosis on L upper tibial region. No edema, no clubbing or cyanosis  Neuro: AOx3, CN2-12 grossly intact, FERRER  Skin: warm, perfused     TELEMETRY:    LABS:                        8.3    6.47  )-----------( 224      ( 12 May 2022 07:12 )             24.1     05-12    142  |  105  |  56<H>  ----------------------------<  98  3.7   |  25  |  4.21<H>    Ca    8.0<L>      12 May 2022 07:12  Phos  3.4     05-12  Mg     1.4     05-12    TPro  5.1<L>  /  Alb  2.8<L>  /  TBili  0.3  /  DBili  x   /  AST  243<H>  /  ALT  159<H>  /  [employer]os  51  05-12    CARDIAC MARKERS ( 12 May 2022 07:12 )  x     / x     / 4469 U/L / x     / x      CARDIAC MARKERS ( 11 May 2022 20:32 )  x     / x     / 6478 U/L / x     / x      CARDIAC MARKERS ( 11 May 2022 15:13 )  x     / x     / 6353 U/L / x     / x      CARDIAC MARKERS ( 11 May 2022 07:19 )  x     / x     / 6214 U/L / x     / x              Creatinine Trend: 4.21<--, 4.44<--, 4.73<--, 5.32<--, 5.23<--, 5.67<--  I&O's Summary    12 May 2022 07:01  -  13 May 2022 07:00  --------------------------------------------------------  IN: 480 mL / OUT: 600 mL / NET: -120 mL      BNP    RADIOLOGY & ADDITIONAL STUDIES:            
Diane Murphy | PGY-1  Internal Medicine    OVERNIGHT EVENTS: no overnight events      SUBJECTIVE: Patient was examined at bedside this morning. Appeared comfortable. Overnight vitals and monitoring results were reviewed. Reporting no complaints. Denied chest pain, SOB, abdominal pain, vomiting, diarrhea, constipation.       MEDICATIONS  (STANDING):  aMIOdarone    Tablet 200 milliGRAM(s) Oral daily  cyanocobalamin 1000 MICROGram(s) Oral daily  dextrose 5%. 1000 milliLiter(s) (50 mL/Hr) IV Continuous <Continuous>  dextrose 5%. 1000 milliLiter(s) (100 mL/Hr) IV Continuous <Continuous>  dextrose 50% Injectable 25 Gram(s) IV Push once  dextrose 50% Injectable 12.5 Gram(s) IV Push once  dextrose 50% Injectable 25 Gram(s) IV Push once  donepezil 5 milliGRAM(s) Oral at bedtime  glucagon  Injectable 1 milliGRAM(s) IntraMuscular once  heparin   Injectable 5000 Unit(s) SubCutaneous every 8 hours  insulin lispro (ADMELOG) corrective regimen sliding scale   SubCutaneous three times a day before meals  insulin lispro (ADMELOG) corrective regimen sliding scale   SubCutaneous at bedtime  lactated ringers. 1000 milliLiter(s) (200 mL/Hr) IV Continuous <Continuous>  metoprolol succinate ER 25 milliGRAM(s) Oral daily  multivitamin 1 Tablet(s) Oral daily  pantoprazole    Tablet 40 milliGRAM(s) Oral before breakfast  sodium bicarbonate  Infusion 0.379 mEq/kG/Hr (150 mL/Hr) IV Continuous <Continuous>    MEDICATIONS  (PRN):  acetaminophen     Tablet .. 650 milliGRAM(s) Oral every 6 hours PRN Mild Pain (1 - 3)  dextrose Oral Gel 15 Gram(s) Oral once PRN Blood Glucose LESS THAN 70 milliGRAM(s)/deciliter        T(F): 99.1 (05-12-22 @ 04:53), Max: 99.1 (05-12-22 @ 04:53)  HR: 62 (05-12-22 @ 04:53) (56 - 62)  BP: 138/86 (05-12-22 @ 04:53) (135/72 - 158/60)  BP(mean): --  RR: 17 (05-12-22 @ 04:53) (17 - 18)  SpO2: 96% (05-12-22 @ 04:53) (96% - 99%)    PHYSICAL EXAM:     Gen: Alert, well-developed, NAD  HEENT: NCAT, EOMI, clear conjunctiva, no scleral icterus, no erythema or exudates in the oropharynx, mmm  Neck: Supple  CV: RRR, S1S2  Resp: Normal respiratory effort. CTA  Abd: Soft, NT, ND  : escobar in place draining light yellow urine  Ext: Mild TTP in calves R>L and in thighs. Mild ecchymosis on L upper tibial region. No edema, no clubbing or cyanosis  Neuro: AOx3, CN2-12 grossly intact, FERRER  Skin: warm, perfused       TELEMETRY:    LABS:                        9.0    6.48  )-----------( 223      ( 11 May 2022 07:21 )             26.3     05-11    141  |  100  |  60<H>  ----------------------------<  133<H>  3.8   |  25  |  4.44<H>    Ca    8.6      11 May 2022 20:32  Phos  3.5     05-11  Mg     1.6     05-11    TPro  5.3<L>  /  Alb  2.6<L>  /  TBili  0.4  /  DBili  x   /  AST  283<H>  /  ALT  168<H>  /  AlkPhos  55  05-11    CARDIAC MARKERS ( 11 May 2022 20:32 )  x     / x     / 6478 U/L / x     / x      CARDIAC MARKERS ( 11 May 2022 15:13 )  x     / x     / 6353 U/L / x     / x      CARDIAC MARKERS ( 11 May 2022 07:19 )  x     / x     / 6214 U/L / x     / x      CARDIAC MARKERS ( 11 May 2022 01:46 )  x     / x     / 6510 U/L / x     / x      CARDIAC MARKERS ( 10 May 2022 20:03 )  x     / x     / 7323 U/L / x     / x      CARDIAC MARKERS ( 10 May 2022 13:23 )  x     / x     / 8098 U/L / x     / x              Creatinine Trend: 4.44<--, 4.73<--, 5.32<--, 5.23<--, 5.67<--, 5.89<--  I&O's Summary    11 May 2022 07:01  -  12 May 2022 07:00  --------------------------------------------------------  IN: 3120 mL / OUT: 1200 mL / NET: 1920 mL      BNP    RADIOLOGY & ADDITIONAL STUDIES:            
Diane Murphy | PGY-1  Internal Medicine    OVERNIGHT EVENTS: no overnight events      SUBJECTIVE: Patient was examined at bedside this morning. Appeared comfortable. Overnight vitals and monitoring results were reviewed. Reporting no complaints. Denied chest pain, SOB, abdominal pain, vomiting, diarrhea, constipation.       MEDICATIONS  (STANDING):  aMIOdarone    Tablet 200 milliGRAM(s) Oral daily  aspirin enteric coated 81 milliGRAM(s) Oral daily  cyanocobalamin 1000 MICROGram(s) Oral daily  dextrose 5%. 1000 milliLiter(s) (50 mL/Hr) IV Continuous <Continuous>  dextrose 5%. 1000 milliLiter(s) (100 mL/Hr) IV Continuous <Continuous>  dextrose 50% Injectable 25 Gram(s) IV Push once  dextrose 50% Injectable 12.5 Gram(s) IV Push once  dextrose 50% Injectable 25 Gram(s) IV Push once  donepezil 5 milliGRAM(s) Oral at bedtime  glucagon  Injectable 1 milliGRAM(s) IntraMuscular once  heparin   Injectable 5000 Unit(s) SubCutaneous every 8 hours  insulin lispro (ADMELOG) corrective regimen sliding scale   SubCutaneous three times a day before meals  insulin lispro (ADMELOG) corrective regimen sliding scale   SubCutaneous at bedtime  lactated ringers. 1000 milliLiter(s) (150 mL/Hr) IV Continuous <Continuous>  metoprolol succinate ER 25 milliGRAM(s) Oral daily  multivitamin 1 Tablet(s) Oral daily  pantoprazole    Tablet 40 milliGRAM(s) Oral before breakfast  sodium bicarbonate  Infusion 0.379 mEq/kG/Hr (150 mL/Hr) IV Continuous <Continuous>    MEDICATIONS  (PRN):  acetaminophen     Tablet .. 650 milliGRAM(s) Oral every 6 hours PRN Mild Pain (1 - 3)  dextrose Oral Gel 15 Gram(s) Oral once PRN Blood Glucose LESS THAN 70 milliGRAM(s)/deciliter        T(F): 97.8 (05-10-22 @ 05:41), Max: 98.2 (05-09-22 @ 20:50)  HR: 64 (05-10-22 @ 06:47) (60 - 64)  BP: 138/69 (05-10-22 @ 05:41) (122/61 - 138/69)  BP(mean): --  RR: 18 (05-10-22 @ 05:41) (18 - 18)  SpO2: 97% (05-10-22 @ 05:41) (96% - 100%)    PHYSICAL EXAM:     Gen: Alert, well-developed, NAD  HEENT: NCAT, EOMI, clear conjunctiva, no scleral icterus, no erythema or exudates in the oropharynx, mmm  Neck: Supple  CV: RRR, S1S2  Resp: Mild atelectatic crackles in bases, otherwise clear. normal respiratory effort  Abd: Soft, NT, ND  : escobar in place draining light yellow urine  Ext: Mild TTP in calves R>L and in thighs. Mild ecchymosis on L upper tibial region. No edema, no clubbing or cyanosis  Neuro: AOx3, CN2-12 grossly intact, FERRER  Skin: warm, perfused     TELEMETRY:    LABS:                        9.2    6.53  )-----------( 223      ( 10 May 2022 05:36 )             27.0     05-10    140  |  100  |  82<H>  ----------------------------<  101<H>  3.6   |  25  |  5.88<H>    Ca    7.9<L>      10 May 2022 05:35  Phos  5.0     05-10  Mg     1.9     05-10    TPro  5.4<L>  /  Alb  2.8<L>  /  TBili  0.3  /  DBili  x   /  AST  302<H>  /  ALT  191<H>  /  AlkPhos  61  05-10    CARDIAC MARKERS ( 10 May 2022 05:35 )  x     / x     / 7178 U/L / x     / x      CARDIAC MARKERS ( 09 May 2022 22:08 )  x     / x     / 7549 U/L / x     / x      CARDIAC MARKERS ( 09 May 2022 17:07 )  x     / x     / 8040 U/L / x     / x      CARDIAC MARKERS ( 09 May 2022 10:12 )  x     / x     / 9331 U/L / x     / x      CARDIAC MARKERS ( 08 May 2022 19:49 )  x     / x     / 19401 U/L / x     / 91.5 ng/mL  CARDIAC MARKERS ( 08 May 2022 16:49 )  x     / x     / 90525 U/L / x     / x          PT/INR - ( 08 May 2022 18:49 )   PT: 11.1 sec;   INR: 0.97 ratio         PTT - ( 08 May 2022 18:49 )  PTT:25.0 sec    Creatinine Trend: 5.88<--, 6.01<--, 6.11<--, 6.24<--, 6.42<--, 6.71<--  I&O's Summary    09 May 2022 07:01  -  10 May 2022 07:00  --------------------------------------------------------  IN: 3540 mL / OUT: 3400 mL / NET: 140 mL      BNP    RADIOLOGY & ADDITIONAL STUDIES:            
Diane Murphy | PGY-1  Internal Medicine    OVERNIGHT EVENTS: no overnight events      SUBJECTIVE: Patient was examined at bedside this morning. Appeared comfortable. Overnight vitals and monitoring results were reviewed. Reporting no complaints. Denied chest pain, SOB, abdominal pain, vomiting, diarrhea, constipation.       MEDICATIONS  (STANDING):  aMIOdarone    Tablet 200 milliGRAM(s) Oral daily  aspirin enteric coated 81 milliGRAM(s) Oral daily  cyanocobalamin 1000 MICROGram(s) Oral daily  dextrose 5%. 1000 milliLiter(s) (50 mL/Hr) IV Continuous <Continuous>  dextrose 5%. 1000 milliLiter(s) (100 mL/Hr) IV Continuous <Continuous>  dextrose 50% Injectable 25 Gram(s) IV Push once  dextrose 50% Injectable 12.5 Gram(s) IV Push once  dextrose 50% Injectable 25 Gram(s) IV Push once  donepezil 5 milliGRAM(s) Oral at bedtime  glucagon  Injectable 1 milliGRAM(s) IntraMuscular once  heparin   Injectable 5000 Unit(s) SubCutaneous every 8 hours  insulin lispro (ADMELOG) corrective regimen sliding scale   SubCutaneous three times a day before meals  insulin lispro (ADMELOG) corrective regimen sliding scale   SubCutaneous at bedtime  lactated ringers. 1000 milliLiter(s) (200 mL/Hr) IV Continuous <Continuous>  metoprolol succinate ER 25 milliGRAM(s) Oral daily  multivitamin 1 Tablet(s) Oral daily  pantoprazole    Tablet 40 milliGRAM(s) Oral before breakfast  sodium bicarbonate  Infusion 0.379 mEq/kG/Hr (150 mL/Hr) IV Continuous <Continuous>    MEDICATIONS  (PRN):  acetaminophen     Tablet .. 650 milliGRAM(s) Oral every 6 hours PRN Mild Pain (1 - 3)  dextrose Oral Gel 15 Gram(s) Oral once PRN Blood Glucose LESS THAN 70 milliGRAM(s)/deciliter        T(F): 98.2 (05-11-22 @ 04:56), Max: 98.6 (05-10-22 @ 21:11)  HR: 59 (05-11-22 @ 04:56) (56 - 67)  BP: 150/66 (05-11-22 @ 04:56) (150/66 - 155/66)  BP(mean): --  RR: 18 (05-11-22 @ 04:56) (18 - 18)  SpO2: 97% (05-11-22 @ 04:56) (96% - 98%)    PHYSICAL EXAM:     Gen: Alert, well-developed, NAD  HEENT: NCAT, EOMI, clear conjunctiva, no scleral icterus, no erythema or exudates in the oropharynx, mmm  Neck: Supple  CV: RRR, S1S2  Resp: Normal respiratory effort. CTA  Abd: Soft, NT, ND  : escobar in place draining light yellow urine  Ext: Mild TTP in calves R>L and in thighs. Mild ecchymosis on L upper tibial region. No edema, no clubbing or cyanosis  Neuro: AOx3, CN2-12 grossly intact, FERRER  Skin: warm, perfused     TELEMETRY:    LABS:                        9.2    6.53  )-----------( 223      ( 10 May 2022 05:36 )             27.0     05-11    143  |  104  |  71<H>  ----------------------------<  94  3.4<L>   |  26  |  5.23<H>    Ca    8.1<L>      11 May 2022 01:46  Phos  4.6     05-11  Mg     1.7     05-11    TPro  5.4<L>  /  Alb  2.8<L>  /  TBili  0.3  /  DBili  x   /  AST  302<H>  /  ALT  191<H>  /  AlkPhos  61  05-10    CARDIAC MARKERS ( 11 May 2022 01:46 )  x     / x     / 6510 U/L / x     / x      CARDIAC MARKERS ( 10 May 2022 20:03 )  x     / x     / 7323 U/L / x     / x      CARDIAC MARKERS ( 10 May 2022 13:23 )  x     / x     / 8098 U/L / x     / x      CARDIAC MARKERS ( 10 May 2022 05:35 )  x     / x     / 7178 U/L / x     / x      CARDIAC MARKERS ( 09 May 2022 22:08 )  x     / x     / 7549 U/L / x     / x      CARDIAC MARKERS ( 09 May 2022 17:07 )  x     / x     / 8040 U/L / x     / x      CARDIAC MARKERS ( 09 May 2022 10:12 )  x     / x     / 9331 U/L / x     / x              Creatinine Trend: 5.23<--, 5.67<--, 5.89<--, 5.88<--, 6.01<--, 6.11<--  I&O's Summary    10 May 2022 07:01  -  11 May 2022 07:00  --------------------------------------------------------  IN: 2310 mL / OUT: 2400 mL / NET: -90 mL      BNP    RADIOLOGY & ADDITIONAL STUDIES:            
Diane TaylorJesiJones | PGY-1  Internal Medicine    OVERNIGHT EVENTS: no overnight events      SUBJECTIVE: Patient was examined at bedside this morning. Appeared comfortable. Overnight vitals and monitoring results were reviewed. Reporting no complaints. Denied chest pain, SOB, abdominal pain, vomiting, diarrhea, constipation.       MEDICATIONS  (STANDING):  aMIOdarone    Tablet 200 milliGRAM(s) Oral daily  aspirin enteric coated 81 milliGRAM(s) Oral daily  cyanocobalamin 1000 MICROGram(s) Oral daily  dextrose 5%. 1000 milliLiter(s) (50 mL/Hr) IV Continuous <Continuous>  dextrose 5%. 1000 milliLiter(s) (100 mL/Hr) IV Continuous <Continuous>  dextrose 50% Injectable 25 Gram(s) IV Push once  dextrose 50% Injectable 12.5 Gram(s) IV Push once  dextrose 50% Injectable 25 Gram(s) IV Push once  donepezil 5 milliGRAM(s) Oral at bedtime  glucagon  Injectable 1 milliGRAM(s) IntraMuscular once  insulin lispro (ADMELOG) corrective regimen sliding scale   SubCutaneous three times a day before meals  insulin lispro (ADMELOG) corrective regimen sliding scale   SubCutaneous at bedtime  metoprolol succinate ER 25 milliGRAM(s) Oral daily  multivitamin 1 Tablet(s) Oral daily  pantoprazole    Tablet 40 milliGRAM(s) Oral before breakfast  sodium bicarbonate  Infusion 0.379 mEq/kG/Hr (150 mL/Hr) IV Continuous <Continuous>    MEDICATIONS  (PRN):  dextrose Oral Gel 15 Gram(s) Oral once PRN Blood Glucose LESS THAN 70 milliGRAM(s)/deciliter        T(F): 97.9 (05-09-22 @ 07:06), Max: 97.9 (05-09-22 @ 04:37)  HR: 63 (05-09-22 @ 07:06) (59 - 68)  BP: 146/65 (05-09-22 @ 07:06) (110/63 - 146/65)  BP(mean): --  RR: 18 (05-09-22 @ 07:06) (18 - 19)  SpO2: 98% (05-09-22 @ 07:06) (97% - 98%)    PHYSICAL EXAM:     Gen: Alert, well-developed, NAD  HEENT: NCAT, EOMI, clear conjunctiva, no scleral icterus, no erythema or exudates in the oropharynx, mmm  Neck: Supple  CV: RRR, S1S2  Resp: Mild atelectatic crackles in bases, otherwise clear. normal respiratory effort  Abd: Soft, NT, ND  : escobar in place draining light yellow urine  Ext: Mild TTP in calves R>L and in thighs. Mild ecchymosis on L upper tibial region. No edema, no clubbing or cyanosis  Neuro: AOx3, CN2-12 grossly intact, FERRER  Skin: warm, perfused     TELEMETRY:    LABS:                        11.2   7.68  )-----------( 199      ( 08 May 2022 16:49 )             34.6     05-08    136  |  100  |  91<H>  ----------------------------<  51<LL>  4.3   |  19<L>  |  6.42<H>    Ca    8.6      08 May 2022 19:49  Phos  7.6     05-08  Mg     2.7     05-08    TPro  6.9  /  Alb  3.6  /  TBili  0.4  /  DBili  x   /  AST  516<H>  /  ALT  275<H>  /  AlkPhos  68  05-08    CARDIAC MARKERS ( 08 May 2022 19:49 )  x     / x     / 72581 U/L / x     / 91.5 ng/mL  CARDIAC MARKERS ( 08 May 2022 16:49 )  x     / x     / 86216 U/L / x     / x          PT/INR - ( 08 May 2022 18:49 )   PT: 11.1 sec;   INR: 0.97 ratio         PTT - ( 08 May 2022 18:49 )  PTT:25.0 sec  Blood Gas Profile w/Lytes - Venous: Performed In Lab (05-08-22 @ 18:49)    Creatinine Trend: 6.42<--, 6.71<--  I&O's Summary    09 May 2022 07:01  -  09 May 2022 07:35  --------------------------------------------------------  IN: 0 mL / OUT: 900 mL / NET: -900 mL      BNP  Blood Gas Profile w/Lytes - Venous: Performed In Lab (05-08-22 @ 18:49)    RADIOLOGY & ADDITIONAL STUDIES:

## 2022-05-13 NOTE — DISCHARGE NOTE NURSING/CASE MANAGEMENT/SOCIAL WORK - NSDCFUADDAPPT_GEN_ALL_CORE_FT
Please follow-up with your primary care and kidney doctor.   Please make an appointment with Dr. Tomlin within 1 week.

## 2022-05-13 NOTE — PROGRESS NOTE ADULT - PROBLEM SELECTOR PROBLEM 1
Rhabdomyolysis
SHAZIA (acute kidney injury)
Rhabdomyolysis
SHAZIA (acute kidney injury)
Rhabdomyolysis

## 2022-05-13 NOTE — PROGRESS NOTE ADULT - PROBLEM SELECTOR PROBLEM 5
History of atrial paroxysmal tachycardia

## 2022-05-13 NOTE — PROGRESS NOTE ADULT - PROBLEM SELECTOR PROBLEM 2
SHAZIA (acute kidney injury)

## 2022-05-13 NOTE — PROGRESS NOTE ADULT - PROBLEM SELECTOR PROBLEM 4
T2DM (type 2 diabetes mellitus)

## 2022-05-13 NOTE — PROGRESS NOTE ADULT - PROBLEM SELECTOR PLAN 1
Pt with SHAZIA on CKD, likely in setting of rhabdo, and ARB use. Exact duration of SHAZIA however unknown. Noted to have Scr prior to admission of 1.5 on 4/11/22, Scr was 1.5 also in Dec 2021, and was 1.8 in April 2021. Scr on admission was 6.71 on 5/8/22[place] [address]. UA with pyuria and hematuria. Also noted 1 grams of protein on spot urine TP/CR ratio. Received IV fluids. Kidney u/s with bilateral increased echogenicity may be seen in the setting of medical renal disease and b/l cysts. Serological w/up so far C3 and C4 not low, DANIEL 1:320, ANCAs negative, anti-GBM negative, Hep B, Hep C and HIV negative. Kappa/lambda not high. Anti PLA2R negative, SIFE no monoclonality,  Scr today improved to 3.39 with CK of 3485.    Hold ARB. Will follow parvovirus PCR. Monitor labs and urine output. Unlikely will need kidney bx now that Scr continues to improves. Avoid NSAIDs, ACEI/ARBS, RCA and nephrotoxins. Dose medications as per eGFR.    No opposition for discharge from nephrology stand point. Pt. needs to follow with Dr. Tomlin (her nephrologist) in 2 weeks time.     If any questions, please feel free to contact me     Juliet Lombardo  Nephrology Fellow  [facility] Pager: [phone]  [facility] Pager: [phone].

## 2022-05-13 NOTE — PROGRESS NOTE ADULT - PROBLEM SELECTOR PLAN 2
Cr 6.71, baseline ~1.45 in 2020.  Likely due to hypovolemia from "third-spacing" due to the influx of extracellular fluid into injured muscles from rhabdo.  +electrolyte derangements: Mag and Phos elevated.  K, Ca wnl. EKG without significant derangements.    - cont IVF  - s/p sodium bicarb gtt  - monitor CMP/Mg/Phos/uric acid  - Check uric acid, if >8, add allopurinol  - hold home telmisartan, statin   - escobar in place, monitor strict I/Os, avoid nephrotoxins  - nephro consult, rec appreciated   - pt with hx simple b/l renal cysts on US 12/2019  - renal US showed b/l increased echogenicity and b/l simple cysts Cr 6.71, baseline ~1.45 in 2020.  Likely due to hypovolemia from "third-spacing" due to the influx of extracellular fluid into injured muscles from rhabdo.  +electrolyte derangements: Mag and Phos elevated.  K, Ca wnl. EKG without significant derangements.    - s/p sodium bicarb gtt  - monitor CMP/Mg/Phos/uric acid  - Check uric acid, if >8, add allopurinol  - hold home telmisartan, statin   - escobar in place, monitor strict I/Os, avoid nephrotoxins  - nephro consult, rec appreciated   - pt with hx simple b/l renal cysts on US 12/2019  - renal US showed b/l increased echogenicity and b/l simple cysts

## 2022-05-13 NOTE — DISCHARGE NOTE NURSING/CASE MANAGEMENT/SOCIAL WORK - NSDCPEFALRISK_GEN_ALL_CORE
For information on Fall & Injury Prevention, visit: [email or web address] OR  [email or web address] OR  https://www.cdc.gov/steadi/patient.html

## 2022-05-13 NOTE — PROGRESS NOTE ADULT - PROBLEM/PLAN-6
DISPLAY PLAN FREE TEXT
[Physical Growth and Development] : physical growth and development
[Social and Academic Competence] : social and academic competence
[Emotional Well-Being] : emotional well-being
[] : The components of the vaccine(s) to be administered today are listed in the plan of care. The disease(s) for which the vaccine(s) are intended to prevent and the risks have been discussed with the caretaker.  The risks are also included in the appropriate vaccination information statements which have been provided to the patient's caregiver.  The caregiver has given consent to vaccinate.
[FreeTextEntry1] : - discussed family's questions and concerns\par - growth percentiles wnl\par - vision screen passed\par - PHQ-2, CRAFFT and HEADSS assessments unremarkable \par - can follow up in 1 year for next well visit\par 
DISPLAY PLAN FREE TEXT

## 2022-05-13 NOTE — PROGRESS NOTE ADULT - ASSESSMENT
86F w/ T2DM, HLD, IBS, paroxysmal atrial tachycardia, GERD, recent sciatica s/p steroid injection and PO steroid who presents with BLE cramps x 3 weeks, associated weakness x 1 week.  Admitted for rhabdomyolysis and SHAZIA. 
Pt. with SHAZIA and rhabdo.     
Pt. with SHAZIA and rhabdo.     
Pt. with SHAZIA and rhabdo.     Pt with SHAZIA on CKD, uncertain etiology, could have component of rhabdo, and ARB use. Exact duration of SHAZIA however unknown. Noted to have Scr prior to admission of 1.5 on 4/11/22, Scr was 1.5 also in Dec 2021, and was 1.8 in April 2021. Scr on admission was 6.71 on 5/8/22. Received IV fluids. Scr improved to 6.24 today. [place] [address], improved to 9331 with IV fluids. UA with pyuria and hematuria. Also noted 1 grams of protein on spot urine TP/CR ratio. Currently on LR at 75 cc/hr. Currently non-oliguric, and crt downtrending slowly    Agree with IV fluids for now. Hold ARB. Continue escobar catheter.     Proteinuria, hematuria- workup ongoing    She has CKD with baseline in 1.8 range follows with Dr Blue Valdez MD  Pager   Office     Contact me directly via [employer] Teams     (After 5 pm or on weekends please page the on-call fellow/attending, can check [email or web address] for schedule. Login is ruby el, schedule under [facility] medicine, psych, derm)  
Pt. with SHAZIA and rhabdo.     Pt with SHAZIA on CKD, uncertain etiology, could have component of rhabdo, and ARB use. Exact duration of SHAZIA however unknown. Noted to have Scr prior to admission of 1.5 on 4/11/22, Scr was 1.5 also in Dec 2021, and was 1.8 in April 2021. Scr on admission was 6.71 on 5/8/22. Received IV fluids. Scr improved to 6.24 today. [place] [address], improved to 9331 with IV fluids. UA with pyuria and hematuria. Also noted 1 grams of protein on spot urine TP/CR ratio. Currently non-oliguric, and crt downtrending slowly    Agree with IV fluids for now. Hold ARB. Continue escobar catheter.   Cont fluids as well for now    Proteinuria, hematuria- workup ongoing, so far negative for anything concerning  Please hold ASA for now as if no improvement and if concern for GN, we may do a renal bx but less likely will need as crt is improving    She has CKD with baseline in 1.8 range follows with Dr Blue Tomlin  Dose as GFR improves    I shall be away tomorrow, please call Attending Dr Robyn Pereyra for the patients.        Venita Valdez MD  Pager   Office     Contact me directly via [employer] Teams     (After 5 pm or on weekends please page the on-call fellow/attending, can check [email or web address] for schedule. Login is ruby el, schedule under [facility] medicine, psych, derm)  
86F w/ T2DM, HLD, IBS, paroxysmal atrial tachycardia, GERD, recent sciatica s/p steroid injection and PO steroid who presents with BLE cramps x 3 weeks, associated weakness x 1 week.  Admitted for rhabdomyolysis and SHAZIA. 

## 2022-05-13 NOTE — DISCHARGE NOTE NURSING/CASE MANAGEMENT/SOCIAL WORK - PATIENT PORTAL LINK FT
You can access the FollowMyHealth Patient Portal offered by [facility] by registering at the following website: [email or web address]. By joining [facility]’s FollowMyHealth portal, you will also be able to view your health information using other applications (apps) compatible with our system.

## 2022-05-13 NOTE — PROGRESS NOTE ADULT - ATTENDING COMMENTS
86F w/ T2DM, HLD, IBS, paroxysmal atrial tachycardia, GERD who presents with BLE cramps x 3 weeks, associated weakness x 1 week.  Has pain when she lifts her legs, walking, or pressing on the gas pedal when driving - mainly in calves, occasionally thighs.  Pain has been worsening. She has had difficulty getting in and out of the car, putting her pants.  Has needed to use cane for past 1 week for difficulty ambulating due to weakness, baseline ambulates without assistance.  About 1 month ago, she had steroid injection for sciatica in R hip after negative MRI (done 4/13).  Per surescripts, she had methylprednisolone dose pack x 6days prescribed on 4/12.  Denies recent exertion, strenuous exercise.   pt is found to have Rhabdomyolysis and SHAZIA , cont with IVF and closely monitor CK and Cr .  Nephro rec is appreciated     # Non Traumatic Rhabdomyolysis with associated SHAZIA and increased LFT      causes is mostly multifactorial ( Steroid, Statin, Amiodarone )       cont IVF , please monitor urine output and electrolytes      s/p sodium bicarb gtt      hold statin, monitor on Tele        COnt to  Trend CK and once stable then will monitor daily and will DC the escobar and then monitor on decreased IVF       nephro consult is appreciated      MOnitor Cr and might need further work up as per Nephro , renal US --> Medical renal disease   # Leg weakness ( could be sec to myositis )      F/up CK and LFT which are downtrending with improving symptoms as per patient      Neg Lower ext dopplers for DVT      Nephro recommends Neuro eval   # DM 2 with DM Neuropathy as per pt on oral hypoglycemic agents at home     A1C >7.5 ( not at goal )     cont to monitor blood glucose , restart ARB once able   Rest as above     Felicia Veloz   HOspitalist   [phone] . 86F w/ T2DM, HLD, IBS, paroxysmal atrial tachycardia, GERD who presents with BLE cramps x 3 weeks, associated weakness x 1 week.  Has pain when she lifts her legs, walking, or pressing on the gas pedal when driving - mainly in calves, occasionally thighs.  Pain has been worsening. She has had difficulty getting in and out of the car, putting her pants.  Has needed to use cane for past 1 week for difficulty ambulating due to weakness, baseline ambulates without assistance.  About 1 month ago, she had steroid injection for sciatica in R hip after negative MRI (done 4/13).  Per surescripts, she had methylprednisolone dose pack x 6days prescribed on 4/12.  Denies recent exertion, strenuous exercise.   pt is found to have Rhabdomyolysis and SHAZIA , cont with IVF and closely monitor CK and Cr .  Nephro rec is appreciated     # Non Traumatic Rhabdomyolysis with associated SHAZIA and increased LFT ---> with great response to IVF      causes is mostly multifactorial ( Steroid, Statin, Amiodarone )       IVF D/C  and CK <4K       s/p sodium bicarb gtt      hold statin, monitor on Tele        DC today and follow up with PCP and nephrologist , patient is to cont to HOLD the STATIN until seen and cleared by PCP       nephro consult is appreciated       Nephro team is ok with DC to home        DC time 47mns   # Leg weakness ( could be sec to myositis )       see above      Neg Lower ext dopplers for DVT      IMproved leg movement   # DM 2 with DM Neuropathy as per pt on oral hypoglycemic agents at home     A1C >7.5 ( not at goal )     cont to monitor blood glucose , restart ARB once able   Rest as above     Felicia Veloz   HOspitalist   [phone] .

## 2022-05-13 NOTE — DISCHARGE NOTE NURSING/CASE MANAGEMENT/SOCIAL WORK - NSDCVIVACCINE_GEN_ALL_CORE_FT
influenza, injectable, quadrivalent, preservative free; 05-Oct-2018 15:43; Danielle Salmon (RN); [employer]; [id number] (Exp. Date: 30-Jun-2019); IntraMuscular; Deltoid Right.; 0.5 milliLiter(s); VIS (VIS Published: 07-Aug-2015, VIS Presented: 05-Oct-2018);

## 2022-05-14 LAB
CREATININE, URINE RESULT: 26 MG/DL — SIGNIFICANT CHANGE UP
PROT ?TM UR-MCNC: 26 MG/DL — HIGH (ref 0–12)

## 2022-05-16 ENCOUNTER — NON-APPOINTMENT (OUTPATIENT)
Age: 87
End: 2022-05-16

## 2022-05-19 ENCOUNTER — APPOINTMENT (OUTPATIENT)
Dept: CARDIOLOGY | Facility: CLINIC | Age: 87
End: 2022-05-19
Payer: MEDICARE

## 2022-05-19 ENCOUNTER — NON-APPOINTMENT (OUTPATIENT)
Age: 87
End: 2022-05-19

## 2022-05-19 VITALS
BODY MASS INDEX: 20.36 KG/M2 | WEIGHT: 130 LBS | DIASTOLIC BLOOD PRESSURE: 49 MMHG | SYSTOLIC BLOOD PRESSURE: 131 MMHG | HEART RATE: 73 BPM | OXYGEN SATURATION: 96 %

## 2022-05-19 DIAGNOSIS — R60.0 LOCALIZED EDEMA: ICD-10-CM

## 2022-05-19 LAB
% GAMMA, URINE: 7.1 % — SIGNIFICANT CHANGE UP
ALBUMIN 24H MFR UR ELPH: 20.7 % — SIGNIFICANT CHANGE UP
ALPHA1 GLOB 24H MFR UR ELPH: 28.5 % — SIGNIFICANT CHANGE UP
ALPHA2 GLOB 24H MFR UR ELPH: 15 % — SIGNIFICANT CHANGE UP
B-GLOBULIN 24H MFR UR ELPH: 28.7 % — SIGNIFICANT CHANGE UP
COLLECT DURATION TIME UR: 24 HR — SIGNIFICANT CHANGE UP
INTERPRETATION 24H UR IFE-IMP: SIGNIFICANT CHANGE UP
INTERPRETATION 24H UR IFE-IMP: SIGNIFICANT CHANGE UP
M PROTEIN 24H UR ELPH-MRATE: 0 MG/24HR — SIGNIFICANT CHANGE UP (ref 0–0)
M PROTEIN 24H UR ELPH-MRATE: 0 MG/DL — SIGNIFICANT CHANGE UP
PROT ?TM UR-MCNC: 26 MG/DL — HIGH (ref 0–12)
PROT PATTERN 24H UR ELPH-IMP: SIGNIFICANT CHANGE UP
PROTEIN QUANT CALC, URINE: 884 MG/24 H — HIGH (ref 50–100)
TOTAL VOLUME - 24 HOUR: 3400 ML — SIGNIFICANT CHANGE UP
URINE CREATININE CALCULATION: 0.9 G/24 H — SIGNIFICANT CHANGE UP (ref 0.8–1.8)

## 2022-05-19 PROCEDURE — 93000 ELECTROCARDIOGRAM COMPLETE: CPT

## 2022-05-19 PROCEDURE — 99214 OFFICE O/P EST MOD 30 MIN: CPT

## 2022-05-19 RX ORDER — TELMISARTAN 40 MG/1
40 TABLET ORAL
Qty: 90 | Refills: 3 | Status: DISCONTINUED | COMMUNITY
Start: 2019-04-17 | End: 2022-05-19

## 2022-05-19 NOTE — DISCUSSION/SUMMARY
[FreeTextEntry1] : The patient was examined. Her blood pressure was 131/49 and her pulse was 73..Her lungs were clear to auscultation. Cardiac exam was  negative for murmurs rubs or gallops.  She had 1+ edema both lower extremities left greater than right the  remainder of her  physical exam was  unremarkable. Her EKG showed sinus rhythm and no acute changes.  The patient should stay on her current medication.She was told to  return in 3 months, or earlier if needed.  Total time spent on the day of the encounter was 34 minutes which includes  face-to-face and non face-to-face times personally spent by the physician preparing to see the patient, obtaining  separately obtained history, performing a medically appropriate exam and evaluation, counseling, educating, talking to the family or caregivers, ordering medicines, ordering tests or procedures, referring and communicating with other healthcare professionals, and documenting clinical information in the electronic health record.

## 2022-05-19 NOTE — REVIEW OF SYSTEMS
[Negative] : Heme/Lymph [Lower Ext Edema] : lower extremity edema [Fever] : no fever [SOB] : no shortness of breath [Dyspnea on exertion] : not dyspnea during exertion [Chest Discomfort] : no chest discomfort [Leg Claudication] : no intermittent leg claudication [Palpitations] : no palpitations [Orthopnea] : no orthopnea [PND] : no PND [Syncope] : no syncope [Coughing Up Blood] : no hemoptysis

## 2022-05-19 NOTE — REASON FOR VISIT
[FreeTextEntry1] : The patient comes in for 2 separate reasons.  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  In addition, she needs medical clearance for possible repeat surgery on her ear.  She denies any chest pains, shortness of breath, or palpitations.\par March 10, 2021: Patient had surgery on her right toes 3 months ago when she is still walking around in a boot for her right foot.  She has been having some pains in her legs and she is worried that she has a deep vein thrombosis.  She denies any swelling.\par May 18, 2021: The patient has no new complaints.  She denies any chest pains, shortness of breath, or palpitations.  She needs forms filled out for her employment.  She also needs a PPD planted.\par August 18, 2021: The patient has no new complaints.  She denies any chest pains, palpitations, or shortness of breath.\par December 15, 2021: Patient is under stress.  She gets pains in the back of her head.  She does go to her neurologist.  She denies any chest pains, shortness of breath, or palpitations.\par April 20, 2022: The patient needs a form filled out because she takes care of her grandchild.  She needs titers for immunity to rubella and rubeola and colloidal gold titer to make sure she does not have tuberculosis.  She had an episode of low back pain and went to her orthopedist who gave her steroid injection and she is feeling better.\par May 19, 2022: The patient was hospitalized at [facility] with bilateral leg pain and dehydration.  She was in the hospital from May 8, 2022 to May 13, 2022.  They stopped her telmisartan.  She has now noticed that both ankles are beginning to swell.  She is drinking a lot of fluid

## 2022-05-19 NOTE — PHYSICAL EXAM
[General Appearance - Well Developed] : well developed [Normal Appearance] : normal appearance [Well Groomed] : well groomed [General Appearance - Well Nourished] : well nourished [No Deformities] : no deformities [General Appearance - In No Acute Distress] : no acute distress [Normal Conjunctiva] : the conjunctiva exhibited no abnormalities [Eyelids - No Xanthelasma] : the eyelids demonstrated no xanthelasmas [Normal Oral Mucosa] : normal oral mucosa [No Oral Pallor] : no oral pallor [No Oral Cyanosis] : no oral cyanosis [Normal Jugular Venous A Waves Present] : normal jugular venous A waves present [Normal Jugular Venous V Waves Present] : normal jugular venous V waves present [No Jugular Venous Bauer A Waves] : no jugular venous bauer A waves [Respiration, Rhythm And Depth] : normal respiratory rhythm and effort [Exaggerated Use Of Accessory Muscles For Inspiration] : no accessory muscle use [Auscultation Breath Sounds / Voice Sounds] : lungs were clear to auscultation bilaterally [Heart Rate And Rhythm] : heart rate and rhythm were normal [Heart Sounds] : normal S1 and S2 [Murmurs] : no murmurs present [Abdomen Soft] : soft [Abdomen Tenderness] : non-tender [Abdomen Mass (___ Cm)] : no abdominal mass palpated [Abnormal Walk] : normal gait [Gait - Sufficient For Exercise Testing] : the gait was sufficient for exercise testing [Nail Clubbing] : no clubbing of the fingernails [Cyanosis, Localized] : no localized cyanosis [Petechial Hemorrhages (___cm)] : no petechial hemorrhages [Skin Color & Pigmentation] : normal skin color and pigmentation [] : no rash [No Venous Stasis] : no venous stasis [Skin Lesions] : no skin lesions [No Skin Ulcers] : no skin ulcer [No Xanthoma] : no  xanthoma was observed [Oriented To Time, Place, And Person] : oriented to person, place, and time [Affect] : the affect was normal [Mood] : the mood was normal [No Anxiety] : not feeling anxious [FreeTextEntry1] : 1+ edema both lower extremities left greater than right

## 2022-05-23 DIAGNOSIS — Z78.0 ASYMPTOMATIC MENOPAUSAL STATE: ICD-10-CM

## 2022-05-23 DIAGNOSIS — Z87.42 PERSONAL HISTORY OF OTHER DISEASES OF THE FEMALE GENITAL TRACT: ICD-10-CM

## 2022-05-23 DIAGNOSIS — N95.2 POSTMENOPAUSAL ATROPHIC VAGINITIS: ICD-10-CM

## 2022-05-24 ENCOUNTER — APPOINTMENT (OUTPATIENT)
Dept: OBGYN | Facility: CLINIC | Age: 87
End: 2022-05-24
Payer: MEDICARE

## 2022-05-24 VITALS
WEIGHT: 131 LBS | BODY MASS INDEX: 20.56 KG/M2 | DIASTOLIC BLOOD PRESSURE: 61 MMHG | SYSTOLIC BLOOD PRESSURE: 127 MMHG | HEIGHT: 67 IN | HEART RATE: 73 BPM

## 2022-05-24 LAB
ALBUMIN SERPL ELPH-MCNC: 4 G/DL
ANION GAP SERPL CALC-SCNC: 12 MMOL/L
BUN SERPL-MCNC: 18 MG/DL
CALCIUM SERPL-MCNC: 9.2 MG/DL
CHLORIDE SERPL-SCNC: 105 MMOL/L
CK SERPL-CCNC: 194 U/L
CO2 SERPL-SCNC: 28 MMOL/L
CREAT SERPL-MCNC: 2 MG/DL
EGFR: 24 ML/MIN/1.73M2
GLUCOSE SERPL-MCNC: 76 MG/DL
HCT VFR BLD CALC: 28.9 %
HGB BLD-MCNC: 9.2 G/DL
PHOSPHATE SERPL-MCNC: 3.1 MG/DL
POTASSIUM SERPL-SCNC: 4.1 MMOL/L
SODIUM SERPL-SCNC: 144 MMOL/L

## 2022-05-24 PROCEDURE — 99213 OFFICE O/P EST LOW 20 MIN: CPT

## 2022-05-24 NOTE — PLAN
[FreeTextEntry1] : CARMNECITA JENKINS is a 86 year old presenting for follow up.\par -pelvic exam done today \par -advised using coconut oil for vaginal dryness\par -advised using Replens

## 2022-05-24 NOTE — HISTORY OF PRESENT ILLNESS
[Patient reported mammogram was normal] : Patient reported mammogram was normal [Patient reported PAP Smear was normal] : Patient reported PAP Smear was normal [Patient reported colonoscopy was normal] : Patient reported colonoscopy was normal [FreeTextEntry1] : CARMENCITA JENKINS is a 86 year old presenting for follow up. Pt states she has vaginal dryness and feels "it is going to fall out" when she is standing up. Overall, doing well.  [Mammogramdate] : 1/10/22 [PapSmeardate] : 12/12/19 [ColonoscopyDate] : 12/22/15

## 2022-05-24 NOTE — END OF VISIT
[FreeTextEntry3] : I, Rosenda Miner, acted solely as a scribe for Dr. Angelita Gusman MD., on 05/24/2022.\par \par All medical record entries made by the scribe were at my, Dr. Angelita Gusman MD., direction and personally dictated by me on 05/24/2022. I have personally reviewed the chart and agree that the record accurately reflects my personal performance of the history, physical exam, assessment and plan.\par

## 2022-05-25 ENCOUNTER — APPOINTMENT (OUTPATIENT)
Dept: NEPHROLOGY | Facility: CLINIC | Age: 87
End: 2022-05-25
Payer: MEDICARE

## 2022-05-25 DIAGNOSIS — N17.9 ACUTE KIDNEY FAILURE, UNSPECIFIED: ICD-10-CM

## 2022-05-25 LAB
EJ: NEGATIVE — SIGNIFICANT CHANGE UP
ENA JO1 AB SER IA-ACNC: <20 UNITS — SIGNIFICANT CHANGE UP
ENA PM/SCL AB SER-ACNC: <20 UNITS — SIGNIFICANT CHANGE UP
ENA SM+RNP AB SER IA-ACNC: <20 UNITS — SIGNIFICANT CHANGE UP
ENA SS-A IGG SER QL: <20 UNITS — SIGNIFICANT CHANGE UP
FIBRILLARIN AB SER QL: NEGATIVE — SIGNIFICANT CHANGE UP
KU AB SER QL: NEGATIVE — SIGNIFICANT CHANGE UP
MDA-5 (P140)(CADM-140): <20 UNITS — SIGNIFICANT CHANGE UP
MI2 AB SER QL: NEGATIVE — SIGNIFICANT CHANGE UP
NXP-2 (P140): <20 UNITS — SIGNIFICANT CHANGE UP
OJ AB SER QL: NEGATIVE — SIGNIFICANT CHANGE UP
PL12 AB SER QL: NEGATIVE — SIGNIFICANT CHANGE UP
PL7 AB SER QL: NEGATIVE — SIGNIFICANT CHANGE UP
SRP AB SERPL QL: NEGATIVE — SIGNIFICANT CHANGE UP
TIF GAMMA (P155/140): <20 UNITS — SIGNIFICANT CHANGE UP
U2 SNRNP AB SER QL: NEGATIVE — SIGNIFICANT CHANGE UP

## 2022-05-25 PROCEDURE — 99214 OFFICE O/P EST MOD 30 MIN: CPT | Mod: 95

## 2022-05-25 RX ORDER — CHLORDIAZEPOXIDE HYDROCHLORIDE AND CLIDINIUM BROMIDE 5; 2.5 MG/1; MG/1
5-2.5 CAPSULE, GELATIN COATED ORAL
Qty: 30 | Refills: 0 | Status: DISCONTINUED | COMMUNITY
Start: 2020-06-03 | End: 2022-05-25

## 2022-05-25 RX ORDER — ESTRADIOL 10 UG/1
10 TABLET VAGINAL
Qty: 8 | Refills: 0 | Status: DISCONTINUED | COMMUNITY
Start: 2017-09-05 | End: 2022-05-25

## 2022-05-26 PROBLEM — N17.9 AKI (ACUTE KIDNEY INJURY): Status: ACTIVE | Noted: 2019-02-20

## 2022-05-26 NOTE — HISTORY OF PRESENT ILLNESS
[FreeTextEntry1] : she had SHAZIA in May 8, 2022 and was hospitalized at [facility]. Cr peaked at 6 and was 3.99 at discharge. her CPK was 13K.  she was having back pain and received steroids injection. was also prescribed a Medrol pack but never took it. she had cramps and leg pain few weeks before admission. she  denies NSAIDs. she was taken off Telmisartan and Rosuvastatin. Her Glucose was also low around 50's before admission and now in the 90's on the low side 90's. no edema. she is doing better on PT for mobility. labs were and ordered and done before today's visit. cr 2.0, cpk 198, hgb 9.2\par she is now taking Rosuvastatin after discharge

## 2022-05-26 NOTE — ASSESSMENT
[FreeTextEntry1] : \par DM, HTN, CKD \par recent SHAZIA secondary to Rhabdomyolysis likely from Rosuvastatin and ARB;  and hypoglycemia on Glipizide with renal failure. \par -given Rhabdo recently and leg cramps prior will DC Rosuvastatin indefinitely \par - hypoglycemia; Decrease Glipizide further to only 2.5mg daily \par -DC vitamin C as it is a precursor to Ca -oxalate and renal crystals and obstruction. \par Renal lesion on previous CT... calcified stone versus complex cyst ;she is following with : Dr. Ortiz Walter\par -anemia will repeat with iron studies next month \par need to repeat renal panel in 1 month. will arrange \par RTC in 3 months \par \par

## 2022-05-26 NOTE — REVIEW OF SYSTEMS
[Feeling Tired] : feeling tired [As Noted in HPI] : as noted in HPI [Negative] : Genitourinary [Lower Ext Edema] : no lower extremity edema [SOB on Exertion] : no shortness of breath during exertion

## 2022-05-26 NOTE — REASON FOR VISIT
[Follow-Up] : a follow-up visit [Home] : at home, [unfilled] , at the time of the visit. [Verbal consent obtained from patient] : the patient, [unfilled] [Medical Office: ([facility])___] : at the medical office located in  [Post Hospitalization] : a post hospitalization visit [FreeTextEntry1] : A/CKD

## 2022-05-26 NOTE — REASON FOR VISIT
[Follow-Up] : a follow-up visit [Home] : at home, [unfilled] , at the time of the visit. [Verbal consent obtained from patient] : the patient, [unfilled] [Medical Office: ([facility])___] : at the medical office located in  [Post Hospitalization] : a post hospitalization visit [FreeTextEntry1] : A/CKD

## 2022-05-26 NOTE — HISTORY OF PRESENT ILLNESS
[FreeTextEntry1] : she had SHAZIA in May 8, 2022 and was hospitalized at [facility]. Cr peaked at 6 and was 3.99 at discharge. her CPK was 13K.  she was having back pain and received steroids injection. was also prescribed a Medrol pack but never took it. she had cramps and leg pain few weeks before admission. she  denies NSAIDs. she was taken off Telmisartan and Rosuvastatin. Her Glucose was also low around 50's before admission and now in the 90's on the low side 90's. no edema. she is doing better on PT for mobility. labs were and ordered and done before today's visit. cr 2.0, cpk 198, hgb 9.2\par she is now taking Rosuvastatin after discharge

## 2022-06-13 ENCOUNTER — APPOINTMENT (OUTPATIENT)
Dept: UROLOGY | Facility: CLINIC | Age: 87
End: 2022-06-13
Payer: MEDICARE

## 2022-06-13 VITALS
HEIGHT: 67 IN | BODY MASS INDEX: 20.56 KG/M2 | HEART RATE: 76 BPM | WEIGHT: 131 LBS | DIASTOLIC BLOOD PRESSURE: 66 MMHG | RESPIRATION RATE: 17 BRPM | SYSTOLIC BLOOD PRESSURE: 163 MMHG

## 2022-06-13 PROCEDURE — 99213 OFFICE O/P EST LOW 20 MIN: CPT

## 2022-06-13 RX ORDER — METHYLPREDNISOLONE 4 MG/1
4 TABLET ORAL
Qty: 21 | Refills: 0 | Status: DISCONTINUED | COMMUNITY
Start: 2022-04-12

## 2022-06-13 RX ORDER — AZELASTINE HYDROCHLORIDE 137 UG/1
0.1 SPRAY, METERED NASAL
Qty: 30 | Refills: 0 | Status: DISCONTINUED | COMMUNITY
Start: 2022-01-21

## 2022-06-21 LAB
ALBUMIN SERPL ELPH-MCNC: 4.2 G/DL
ALBUMIN SERPL ELPH-MCNC: 4.2 G/DL
ALP BLD-CCNC: 64 U/L
ALT SERPL-CCNC: 18 U/L
ANION GAP SERPL CALC-SCNC: 12 MMOL/L
AST SERPL-CCNC: 26 U/L
BILIRUB DIRECT SERPL-MCNC: 0.1 MG/DL
BILIRUB INDIRECT SERPL-MCNC: 0.3 MG/DL
BILIRUB SERPL-MCNC: 0.4 MG/DL
BUN SERPL-MCNC: 21 MG/DL
CALCIUM SERPL-MCNC: 9.3 MG/DL
CHLORIDE SERPL-SCNC: 103 MMOL/L
CK SERPL-CCNC: 93 U/L
CO2 SERPL-SCNC: 25 MMOL/L
CREAT SERPL-MCNC: 1.44 MG/DL
EGFR: 35 ML/MIN/1.73M2
FERRITIN SERPL-MCNC: 92 NG/ML
GLUCOSE SERPL-MCNC: 125 MG/DL
HCT VFR BLD CALC: 28 %
HGB BLD-MCNC: 9.2 G/DL
IRON SATN MFR SERPL: 32 %
IRON SERPL-MCNC: 89 UG/DL
PHOSPHATE SERPL-MCNC: 2.9 MG/DL
POTASSIUM SERPL-SCNC: 4 MMOL/L
PROT SERPL-MCNC: 6.7 G/DL
SODIUM SERPL-SCNC: 141 MMOL/L
TIBC SERPL-MCNC: 278 UG/DL
UIBC SERPL-MCNC: 189 UG/DL

## 2022-06-22 NOTE — HISTORY OF PRESENT ILLNESS
[FreeTextEntry1] : Patient is a 86 year old woman presents for a history of complex renal cysts. \par \par She reports she was hospitalized last month for a creatinine of 6 mg/dL \par Has noticed some increased urinary frequency. Denies any hematuria, dysuria or incontinence. \par \par Creatinine May 23rd 2022- 2 mg/dL \par \par Renal US on May demonstrated Right kidney: 11.0 cm. Increased echogenicity. A parapelvic cyst measures approximately 2.6 x 2.1 x 2.7 cm. A cortical mid pole cyst measures approximately 1.5 x 1.3 from 0.6 cm. No renal mass, hydronephrosis or calculi.\par \par Left kidney: 10.6 cm. An exophytic lower pole cyst measures approximately 4.0 x 3.8 x 4.2 cm. No renal mass, hydronephrosis or calculi.\par \par No other new complaints.  Urinary function stable.

## 2022-06-22 NOTE — ASSESSMENT
[FreeTextEntry1] : US images reviewed with the patient.\par Stable complex renal cyst.  No intervention required.\par Remains asymptomatic.\par Follow up as needed.

## 2022-07-05 NOTE — H&P ADULT - PROBLEM SELECTOR PLAN 5
Blayne Horton is a 66 y.o. male  . Subjective:      Leg pain has improved. Itchiness has improved. Stopping pravastatin did not seem to make that much of a difference so he restarted and currently taking . Was having issues with possible esophageal dysphagia . Had upper endoscopy. This was normal.  Surgeon suggested that he see ENT. Surgery saw ENT they stated that this was not something they would deal with and that he should have his neck evaluated. Patient has appointment with Bart Mcqueen. Has been having increased neck pain like it was prior to last surgery. He is going to have have an x-ray with Dr. Bart Mcqueen. I suggested that we do an ultrasound to look at soft tissue of neck as well just to make sure there is nothing else going on. We may also want to consider doing a CT and a formal swallowing study. May also consider consult with neurology. Patient to have a low threshold for going to the ER if symptoms worsen or change. Allergic rhinitis has worsened Flonase no longer working having a lot of drainage. Review of Systems   Constitutional: Positive for fatigue. Negative for activity change, appetite change, chills, diaphoresis, fever and unexpected weight change. HENT: Negative for congestion, dental problem, drooling, ear discharge, ear pain, facial swelling, hearing loss, mouth sores, nosebleeds, postnasal drip, rhinorrhea, sinus pressure, sneezing, sore throat, tinnitus, trouble swallowing and voice change. Eyes: Negative for photophobia, pain, discharge, redness, itching and visual disturbance. Respiratory: Negative for apnea, cough, choking, chest tightness, shortness of breath, wheezing and stridor. Cardiovascular: Negative for chest pain, palpitations and leg swelling. Gastrointestinal: Negative for abdominal distention, abdominal pain, anal bleeding, blood in stool, constipation, diarrhea, nausea, rectal pain and vomiting.         Swallowing issues   Endocrine: Negative for cold Recommended observation. intolerance, heat intolerance, polydipsia, polyphagia and polyuria. Genitourinary: Negative for decreased urine volume, difficulty urinating, dysuria, enuresis, flank pain, frequency, genital sores, hematuria, penile discharge, penile pain, penile swelling, scrotal swelling, testicular pain and urgency. Musculoskeletal: Positive for arthralgias, neck pain and neck stiffness. Negative for back pain, gait problem, joint swelling and myalgias. Skin: Negative for color change, pallor, rash and wound. Allergic/Immunologic: Negative for environmental allergies, food allergies and immunocompromised state. Neurological: Negative for dizziness, tremors, seizures, syncope, facial asymmetry, speech difficulty, weakness, light-headedness, numbness and headaches. Hematological: Negative for adenopathy. Does not bruise/bleed easily. Psychiatric/Behavioral: Negative for agitation, behavioral problems, confusion, decreased concentration, dysphoric mood, hallucinations, self-injury, sleep disturbance and suicidal ideas. The patient is not nervous/anxious and is not hyperactive. Past Medical History:   Diagnosis Date    Abnormal computed tomography angiography of heart 07/19/2019    Calcified plaque proximal RCA with 50-70% stenosis, proximal LAD 50% stenosis, mild LAD 30-50% stenosis, groundglass infiltrates, area of consoldidation left lung base posteriorly.       Arthritis     Atrial fibrillation (HCC)     Burning pain     CAD (coronary artery disease)     Degenerative lumbar disc     Diabetes ([facility] [address]     Dysphagia     Edentulous     upper denture    H/O cardiovascular stress test 02/19/2020    Lexiscan    Herniated cervical disc     History of echocardiogram 03/16/2017    EF 60-65%    Hx of blood clots     Hyperlipidemia     Hypertension     Stented coronary artery     Tinnitus     Urinary retention 3/17/2017       Social History     Socioeconomic History    Marital status:      Spouse name: Not on file    Number of children: Not on file    Years of education: Not on file    Highest education level: Not on file   Occupational History    Not on file   Tobacco Use    Smoking status: Former Smoker     Packs/day: 0.00     Years: 16.00     Pack years: 0.00     Quit date: 1977     Years since quittin.5    Smokeless tobacco: Never Used    Tobacco comment: quit in    Vaping Use    Vaping Use: Never used   Substance and Sexual Activity    Alcohol use: No     Alcohol/week: 0.0 standard drinks     Comment: 1-2 cups coffee daily    Drug use: No    Sexual activity: Never   Other Topics Concern    Not on file   Social History Narrative    Not on file     Social Determinants of Health     Financial Resource Strain: Low Risk     Difficulty of Paying Living Expenses: Not very hard   Food Insecurity: No Food Insecurity    Worried About Running Out of Food in the Last Year: Never true    Yuli of Food in the Last Year: Never true   Transportation Needs:     Lack of Transportation (Medical): Not on file    Lack of Transportation (Non-Medical):  Not on file   Physical Activity:     Days of Exercise per Week: Not on file    Minutes of Exercise per Session: Not on file   Stress:     Feeling of Stress : Not on file   Social Connections:     Frequency of Communication with Friends and Family: Not on file    Frequency of Social Gatherings with Friends and Family: Not on file    Attends Jainism Services: Not on file    Active Member of Clubs or Organizations: Not on file    Attends Club or Organization Meetings: Not on file    Marital Status: Not on file   Intimate Partner Violence:     Fear of Current or Ex-Partner: Not on file    Emotionally Abused: Not on file    Physically Abused: Not on file    Sexually Abused: Not on file   Housing Stability:     Unable to Pay for Housing in the Last Year: Not on file    Number of Places Lived in the Last Year: Not on file    Unstable Housing in the Last Year: Not on file       Family History   Problem Relation Age of Onset    Stroke Mother     Other Father     Kidney Disease Brother     Arrhythmia Brother     Cancer Brother     Kidney Disease Sister     Hypertension Other     Diabetes Other        Current Outpatient Medications on File Prior to Visit   Medication Sig Dispense Refill    glimepiride (AMARYL) 4 MG tablet Take 1 tablet by mouth 2 times daily (with meals) 60 tablet 5    glycopyrrolate (ROBINUL) 1 MG tablet Take 1 tablet by mouth 3 times daily 270 tablet 3    hydrALAZINE (APRESOLINE) 25 MG tablet Take 1 tablet by mouth 4 times daily 270 tablet 3    pantoprazole (PROTONIX) 40 MG tablet TAKE 1 TABLET BY MOUTH DAILY AS NEEDED (REFLUX) 30 tablet 5    cevimeline (EVOXAC) 30 MG capsule Take 1 capsule by mouth 3 times daily 90 capsule 5    alfuzosin (UROXATRAL) 10 MG extended release tablet Take 1 tablet by mouth daily 90 tablet 5    clopidogrel (PLAVIX) 75 MG tablet Take 1 tablet by mouth daily (Patient taking differently: Take 75 mg by mouth daily Ld 6/8/) 90 tablet 3    metoprolol succinate (TOPROL XL) 50 MG extended release tablet Take 1 tablet by mouth 2 times daily 180 tablet 3    cyclobenzaprine (FLEXERIL) 10 MG tablet Take 1 tablet by mouth 3 times daily as needed for Muscle spasms 597 tablet 5    folic acid (FOLVITE) 1 MG tablet Take 1 tablet by mouth daily (Patient taking differently: Take 1 mg by mouth daily Ran out) 90 tablet 5    ammonium lactate (LAC-HYDRIN) 12 % lotion Apply 1 g topically daily as needed for Dry Skin      aspirin 81 MG tablet Take 81 mg by mouth Daily with lunch        No current facility-administered medications on file prior to visit.        Allergies   Allergen Reactions    Imdur [Isosorbide Nitrate] Other (See Comments)     headache    Oxycodone Rash    Simvastatin Other (See Comments)     Muscle weakness    Bactrim [Sulfamethoxazole-Trimethoprim] Nausea Only    Ciprofloxacin Hcl Other (See Comments)     \"Heel/ tendon pain\"    Gabapentin Other (See Comments)     Constipation      Hydrocodone Nausea Only    Norco [Hydrocodone-Acetaminophen] Nausea Only and Other (See Comments)     Causes sick feeling in head         I have reviewedher allergies, medications, problem list, medical, social and family history and have updated as needed in the electronic medical record. Objective:     Physical Exam  Vitals and nursing note reviewed. Constitutional:       General: He is not in acute distress. Appearance: He is well-developed. He is not diaphoretic. HENT:      Head: Normocephalic and atraumatic. Right Ear: External ear normal.      Left Ear: External ear normal.      Nose: Nose normal.      Mouth/Throat:      Pharynx: No oropharyngeal exudate. Eyes:      General: No scleral icterus. Right eye: No discharge. Left eye: No discharge. Conjunctiva/sclera: Conjunctivae normal.      Pupils: Pupils are equal, round, and reactive to light. Neck:      Thyroid: No thyromegaly. Vascular: No JVD. Trachea: No tracheal deviation. Comments: No masses to the neck felt to examination  Cardiovascular:      Rate and Rhythm: Normal rate and regular rhythm. Heart sounds: Normal heart sounds. No murmur heard. No friction rub. No gallop. Pulmonary:      Effort: Pulmonary effort is normal. No respiratory distress. Breath sounds: Normal breath sounds. No stridor. No wheezing or rales. Chest:      Chest wall: No tenderness. Abdominal:      General: Bowel sounds are normal. There is no distension. Palpations: Abdomen is soft. There is no mass. Tenderness: There is no abdominal tenderness. There is no guarding or rebound. Genitourinary:     Comments: Deferred by patient  Musculoskeletal:         General: No tenderness. Normal range of motion. Cervical back: Normal range of motion and neck supple.    Lymphadenopathy:      Cervical: No cervical adenopathy. Skin:     General: Skin is warm and dry. Coloration: Skin is not pale. Findings: No erythema or rash. Neurological:      Mental Status: He is alert and oriented to person, place, and time. Cranial Nerves: No cranial nerve deficit. Motor: No abnormal muscle tone. Coordination: Coordination normal.      Deep Tendon Reflexes: Reflexes are normal and symmetric. Reflexes normal.   Psychiatric:         Behavior: Behavior normal.         Thought Content: Thought content normal.         Judgment: Judgment normal.         Assessment / Plan:   Jessica De Los Santos was seen today for follow-up. Diagnoses and all orders for this visit:    Thyroid fullness  -     US HEAD NECK SOFT TISSUE THYROID; Future    Cervical disc disease  -     Richar Nolen, , [facility], [place] ([place])    Mixed hyperlipidemia  -     pravastatin (PRAVACHOL) 10 MG tablet; TAKE 1 TABLET BY MOUTH DAILY    Type 2 diabetes mellitus with diabetic polyneuropathy, without long-term current use of insulin (HCC)  -     pravastatin (PRAVACHOL) 10 MG tablet; TAKE 1 TABLET BY MOUTH DAILY    Seasonal allergic rhinitis due to pollen  -     azelastine (ASTELIN) 0.1 % nasal spray; 2 sprays by Nasal route 2 times daily Use in each nostril as directed    Dysphagia, unspecified type        Willfollow with own gynecologist for gynecological and breast care. Reviewed health maintenance report. Patient is aware of deficiencies and suggested preventative tests. - hold telmisartan - c/w amiodaron 200mg QD  - c/w metoprolol succ 25 QD

## 2022-08-31 ENCOUNTER — LABORATORY RESULT (OUTPATIENT)
Age: 87
End: 2022-08-31

## 2022-09-02 ENCOUNTER — NON-APPOINTMENT (OUTPATIENT)
Age: 87
End: 2022-09-02

## 2022-09-07 ENCOUNTER — NON-APPOINTMENT (OUTPATIENT)
Age: 87
End: 2022-09-07

## 2022-09-07 ENCOUNTER — APPOINTMENT (OUTPATIENT)
Dept: CARDIOLOGY | Facility: CLINIC | Age: 87
End: 2022-09-07

## 2022-09-07 VITALS
SYSTOLIC BLOOD PRESSURE: 161 MMHG | BODY MASS INDEX: 20.72 KG/M2 | HEART RATE: 55 BPM | WEIGHT: 132 LBS | HEIGHT: 67 IN | DIASTOLIC BLOOD PRESSURE: 75 MMHG | RESPIRATION RATE: 17 BRPM | OXYGEN SATURATION: 98 %

## 2022-09-07 VITALS — DIASTOLIC BLOOD PRESSURE: 60 MMHG | SYSTOLIC BLOOD PRESSURE: 162 MMHG

## 2022-09-07 PROCEDURE — 99214 OFFICE O/P EST MOD 30 MIN: CPT

## 2022-09-07 PROCEDURE — 93000 ELECTROCARDIOGRAM COMPLETE: CPT

## 2022-09-07 NOTE — DISCUSSION/SUMMARY
[EKG obtained to assist in diagnosis and management of assessed problem(s)] : EKG obtained to assist in diagnosis and management of assessed problem(s) [FreeTextEntry1] : The patient was examined. Her blood pressure was 162/60 and her pulse was 55..Her lungs were clear to auscultation. Cardiac exam was  negative for murmurs rubs or gallops.  The  remainder of her  physical exam was  unremarkable. Her EKG showed sinus bradycardia, poor R wave progression in leads V1 and V2, and no acute changes..  The patient should stay on her current medication.She was told to  return in 4 months, or earlier if needed.  Total time spent on the day of the encounter was 35 minutes which includes  face-to-face and non face-to-face times personally spent by the physician preparing to see the patient, obtaining  separately obtained history, performing a medically appropriate exam and evaluation, counseling, educating, talking to the family or caregivers, ordering medicines, ordering tests or procedures, referring and communicating with other healthcare professionals, and documenting clinical information in the electronic health record.

## 2022-09-07 NOTE — PHYSICAL EXAM
[General Appearance - Well Developed] : well developed [Normal Appearance] : normal appearance [Well Groomed] : well groomed [General Appearance - Well Nourished] : well nourished [No Deformities] : no deformities [General Appearance - In No Acute Distress] : no acute distress [Normal Conjunctiva] : the conjunctiva exhibited no abnormalities [Eyelids - No Xanthelasma] : the eyelids demonstrated no xanthelasmas [Normal Oral Mucosa] : normal oral mucosa [No Oral Pallor] : no oral pallor [No Oral Cyanosis] : no oral cyanosis [Normal Jugular Venous A Waves Present] : normal jugular venous A waves present [Normal Jugular Venous V Waves Present] : normal jugular venous V waves present [No Jugular Venous Bauer A Waves] : no jugular venous bauer A waves [Respiration, Rhythm And Depth] : normal respiratory rhythm and effort [Exaggerated Use Of Accessory Muscles For Inspiration] : no accessory muscle use [Auscultation Breath Sounds / Voice Sounds] : lungs were clear to auscultation bilaterally [Heart Rate And Rhythm] : heart rate and rhythm were normal [Heart Sounds] : normal S1 and S2 [Murmurs] : no murmurs present [Abdomen Soft] : soft [Abdomen Tenderness] : non-tender [Abdomen Mass (___ Cm)] : no abdominal mass palpated [Nail Clubbing] : no clubbing of the fingernails [Cyanosis, Localized] : no localized cyanosis [Petechial Hemorrhages (___cm)] : no petechial hemorrhages [Skin Color & Pigmentation] : normal skin color and pigmentation [] : no rash [No Venous Stasis] : no venous stasis [Skin Lesions] : no skin lesions [No Skin Ulcers] : no skin ulcer [No Xanthoma] : no  xanthoma was observed [Oriented To Time, Place, And Person] : oriented to person, place, and time [Affect] : the affect was normal [Mood] : the mood was normal [No Anxiety] : not feeling anxious [FreeTextEntry1] : Slowly with a cane

## 2022-09-07 NOTE — REVIEW OF SYSTEMS
[Negative] : Cardiovascular [Fever] : no fever [SOB] : no shortness of breath [Dyspnea on exertion] : not dyspnea during exertion [Chest Discomfort] : no chest discomfort [Lower Ext Edema] : no extremity edema [Leg Claudication] : no intermittent leg claudication [Palpitations] : no palpitations [Orthopnea] : no orthopnea [PND] : no PND [Syncope] : no syncope [Coughing Up Blood] : no hemoptysis

## 2022-09-07 NOTE — REASON FOR VISIT
[FreeTextEntry1] : The patient comes in for 2 separate reasons.  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  In addition, she needs medical clearance for possible repeat surgery on her ear.  She denies any chest pains, shortness of breath, or palpitations.\par March 10, 2021: Patient had surgery on her right toes 3 months ago when she is still walking around in a boot for her right foot.  She has been having some pains in her legs and she is worried that she has a deep vein thrombosis.  She denies any swelling.\par May 18, 2021: The patient has no new complaints.  She denies any chest pains, shortness of breath, or palpitations.  She needs forms filled out for her employment.  She also needs a PPD planted.\par August 18, 2021: The patient has no new complaints.  She denies any chest pains, palpitations, or shortness of breath.\par December 15, 2021: Patient is under stress.  She gets pains in the back of her head.  She does go to her neurologist.  She denies any chest pains, shortness of breath, or palpitations.\par April 20, 2022: The patient needs a form filled out because she takes care of her grandchild.  She needs titers for immunity to rubella and rubeola and colloidal gold titer to make sure she does not have tuberculosis.  She had an episode of low back pain and went to her orthopedist who gave her steroid injection and she is feeling better.\par May 19, 2022: The patient was hospitalized at [facility] with bilateral leg pain and dehydration.  She was in the hospital from May 8, 2022 to May 13, 2022.  They stopped her telmisartan.  She has now noticed that both ankles are beginning to swell.  She is drinking a lot of fluid\par September 7, 2022: The patient has no new complaints.\par

## 2022-12-06 NOTE — ED ADULT NURSE NOTE - FINAL NURSING ELECTRONIC SIGNATURE
[facility]    Brief Operative Note    Pre-operative diagnosis: Periumbilical mass [R19.05]  Post-operative diagnosis same    Procedure: Procedure(s):  EXCISION, MASS, TORSO, RIGHT PERIUMBILICAL  Surgeon: Surgeon(s) and Role:     * Shravan Sánchez MD - Primary  Anesthesia: General   Estimated Blood Loss: Minimal    Drains: None  Specimens:   ID Type Source Tests Collected by Time Destination   1 : Abdominal Wall Mass Tissue Abdomen SURGICAL PATHOLOGY EXAM Shravan Sánchez MD 12/6/2022 11:18 AM      Findings:   Fatty appearing mass, no hernia  Complications: None.  Implants: * No implants in log *        
08-May-2022 20:05

## 2022-12-19 ENCOUNTER — NON-APPOINTMENT (OUTPATIENT)
Age: 87
End: 2022-12-19

## 2022-12-21 ENCOUNTER — APPOINTMENT (OUTPATIENT)
Dept: CARDIOLOGY | Facility: CLINIC | Age: 87
End: 2022-12-21

## 2022-12-23 ENCOUNTER — APPOINTMENT (OUTPATIENT)
Dept: GASTROENTEROLOGY | Facility: CLINIC | Age: 87
End: 2022-12-23

## 2022-12-23 ENCOUNTER — RX RENEWAL (OUTPATIENT)
Age: 87
End: 2022-12-23

## 2022-12-23 VITALS
SYSTOLIC BLOOD PRESSURE: 120 MMHG | HEART RATE: 68 BPM | BODY MASS INDEX: 20.38 KG/M2 | DIASTOLIC BLOOD PRESSURE: 58 MMHG | WEIGHT: 131.38 LBS | TEMPERATURE: 97.5 F | HEIGHT: 67.5 IN | OXYGEN SATURATION: 97 %

## 2022-12-23 DIAGNOSIS — Z87.19 PERSONAL HISTORY OF OTHER DISEASES OF THE DIGESTIVE SYSTEM: ICD-10-CM

## 2022-12-23 DIAGNOSIS — Z87.898 PERSONAL HISTORY OF OTHER SPECIFIED CONDITIONS: ICD-10-CM

## 2022-12-23 DIAGNOSIS — R10.2 PELVIC AND PERINEAL PAIN: ICD-10-CM

## 2022-12-23 PROCEDURE — 82270 OCCULT BLOOD FECES: CPT

## 2022-12-23 PROCEDURE — 99214 OFFICE O/P EST MOD 30 MIN: CPT

## 2022-12-23 NOTE — REVIEW OF SYSTEMS
[Fever] : no fever [Chills] : no chills [Recent Weight Loss (___ Lbs)] : no recent weight loss [As Noted in HPI] : as noted in HPI [Abdominal Pain] : abdominal pain [Constipation] : constipation [Heartburn] : heartburn [Melena (black stool)] : no melena [Fecal Incontinence (soiling)] : fecal incontinence [Negative] : Respiratory

## 2022-12-23 NOTE — HISTORY OF PRESENT ILLNESS
[FreeTextEntry1] : The patient had a colonoscopy in 2015 and was found to have diverticulosis and a hyperplastic polyp.

## 2022-12-23 NOTE — ASSESSMENT
[FreeTextEntry1] : Subha is an 87-year-old female with a history of type 2 diabetes, cardiac dysrhythmia and memory loss.  From the gastrointestinal perspective the patient has mild dyspeptic symptoms likely on the basis of increased acidity.  She has no dysphagia,  unexplained weight loss or other red flag signs.  The patient was started on famotidine 20 mg once a day for the next several weeks.  If symptoms persist then we will consider imaging such as a sonogram or CAT scan.  Patient has known pelvic floor dysfunction is having incomplete evacuation with pebble-like stool likely on the basis of her diverticulosis.  The patient was told to continue a probiotic and will start soluble fiber and increase her water intake.  The patient may benefit from MiraLAX in the future.  I do not feel that she requires any invasive testing at the present time.  Subha will get back to me in 2 weeks with a progress report.

## 2022-12-23 NOTE — PHYSICAL EXAM
[Alert] : alert [Healthy Appearing] : healthy appearing [No Acute Distress] : no acute distress [Normal] : heart rate was normal and rhythm regular, normal S1 and S2, no murmurs [Bowel Sounds] : normal bowel sounds [Abdomen Tenderness] : non-tender [No Masses] : no abdominal mass palpated [] : no hepatosplenomegaly [Normal Sphincter Tone] : normal sphincter tone [No Rectal Mass] : no rectal mass [Occult Blood] : negative occult blood

## 2023-02-07 ENCOUNTER — APPOINTMENT (OUTPATIENT)
Dept: CARDIOLOGY | Facility: CLINIC | Age: 88
End: 2023-02-07
Payer: MEDICARE

## 2023-02-07 ENCOUNTER — NON-APPOINTMENT (OUTPATIENT)
Age: 88
End: 2023-02-07

## 2023-02-07 VITALS
HEART RATE: 61 BPM | BODY MASS INDEX: 21.25 KG/M2 | WEIGHT: 137 LBS | HEIGHT: 67.5 IN | DIASTOLIC BLOOD PRESSURE: 74 MMHG | OXYGEN SATURATION: 96 % | SYSTOLIC BLOOD PRESSURE: 149 MMHG

## 2023-02-07 PROCEDURE — 99214 OFFICE O/P EST MOD 30 MIN: CPT

## 2023-02-07 PROCEDURE — 93000 ELECTROCARDIOGRAM COMPLETE: CPT

## 2023-02-07 RX ORDER — DONEPEZIL HYDROCHLORIDE 10 MG/1
10 TABLET ORAL DAILY
Qty: 90 | Refills: 3 | Status: ACTIVE | COMMUNITY
Start: 2022-05-25 | End: 1900-01-01

## 2023-02-07 RX ORDER — GLIPIZIDE 5 MG/1
5 TABLET ORAL DAILY
Qty: 45 | Refills: 3 | Status: ACTIVE | COMMUNITY

## 2023-02-07 NOTE — REASON FOR VISIT
[FreeTextEntry1] : The patient comes in for 2 separate reasons.  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  In addition, she needs medical clearance for possible repeat surgery on her ear.  She denies any chest pains, shortness of breath, or palpitations.\par March 10, 2021: Patient had surgery on her right toes 3 months ago when she is still walking around in a boot for her right foot.  She has been having some pains in her legs and she is worried that she has a deep vein thrombosis.  She denies any swelling.\par May 18, 2021: The patient has no new complaints.  She denies any chest pains, shortness of breath, or palpitations.  She needs forms filled out for her employment.  She also needs a PPD planted.\par August 18, 2021: The patient has no new complaints.  She denies any chest pains, palpitations, or shortness of breath.\par December 15, 2021: Patient is under stress.  She gets pains in the back of her head.  She does go to her neurologist.  She denies any chest pains, shortness of breath, or palpitations.\par April 20, 2022: The patient needs a form filled out because she takes care of her grandchild.  She needs titers for immunity to rubella and rubeola and colloidal gold titer to make sure she does not have tuberculosis.  She had an episode of low back pain and went to her orthopedist who gave her steroid injection and she is feeling better.\par May 19, 2022: The patient was hospitalized at [facility] with bilateral leg pain and dehydration.  She was in the hospital from May 8, 2022 to May 13, 2022.  They stopped her telmisartan.  She has now noticed that both ankles are beginning to swell.  She is drinking a lot of fluid\par September 7, 2022: The patient has no new complaints.\par 2/7/23 No new complaints\par \par \par

## 2023-02-07 NOTE — DISCUSSION/SUMMARY
[FreeTextEntry1] : The patient was examined. Her blood pressure was 149/74 and her pulse was 61..Her lungs were clear to auscultation. Cardiac exam was  negative for murmurs rubs or gallops.  The  remainder of her  physical exam was  unremarkable. Her EKG showed sinus bradycardia, poor R wave progression in leads V1 and V2, and no acute changes..  The patient should stay on her current medication.She was told to  return in 4 months, or earlier if needed.  Total time spent on the day of the encounter was 35 minutes which includes  face-to-face and non face-to-face times personally spent by the physician preparing to see the patient, obtaining  separately obtained history, performing a medically appropriate exam and evaluation, counseling, educating, talking to the family or caregivers, ordering medicines, ordering tests or procedures, referring and communicating with other healthcare professionals, and documenting clinical information in the electronic health record.

## 2023-03-06 ENCOUNTER — EMERGENCY (EMERGENCY)
Facility: HOSPITAL | Age: 88
LOS: 1 days | Discharge: HOME | End: 2023-03-06
Attending: EMERGENCY MEDICINE
Payer: MEDICARE

## 2023-03-06 VITALS
DIASTOLIC BLOOD PRESSURE: 71 MMHG | RESPIRATION RATE: 18 BRPM | HEIGHT: 67 IN | TEMPERATURE: 98 F | HEART RATE: 61 BPM | SYSTOLIC BLOOD PRESSURE: 214 MMHG | OXYGEN SATURATION: 96 % | WEIGHT: 136.91 LBS

## 2023-03-06 LAB
BASOPHILS # BLD AUTO: 0.04 K/UL — SIGNIFICANT CHANGE UP (ref 0–0.2)
BASOPHILS NFR BLD AUTO: 0.4 % — SIGNIFICANT CHANGE UP (ref 0–2)
EOSINOPHIL # BLD AUTO: 0.23 K/UL — SIGNIFICANT CHANGE UP (ref 0–0.5)
EOSINOPHIL NFR BLD AUTO: 2.1 % — SIGNIFICANT CHANGE UP (ref 0–6)
HCT VFR BLD CALC: 35.5 % — SIGNIFICANT CHANGE UP (ref 34.5–45)
HGB BLD-MCNC: 11.7 G/DL — SIGNIFICANT CHANGE UP (ref 11.5–15.5)
IMM GRANULOCYTES NFR BLD AUTO: 0.4 % — SIGNIFICANT CHANGE UP (ref 0–0.9)
LYMPHOCYTES # BLD AUTO: 1.91 K/UL — SIGNIFICANT CHANGE UP (ref 1–3.3)
LYMPHOCYTES # BLD AUTO: 17.5 % — SIGNIFICANT CHANGE UP (ref 13–44)
MCHC RBC-ENTMCNC: 30.6 PG — SIGNIFICANT CHANGE UP (ref 27–34)
MCHC RBC-ENTMCNC: 33 GM/DL — SIGNIFICANT CHANGE UP (ref 32–36)
MCV RBC AUTO: 92.9 FL — SIGNIFICANT CHANGE UP (ref 80–100)
MONOCYTES # BLD AUTO: 0.95 K/UL — HIGH (ref 0–0.9)
MONOCYTES NFR BLD AUTO: 8.7 % — SIGNIFICANT CHANGE UP (ref 2–14)
NEUTROPHILS # BLD AUTO: 7.74 K/UL — HIGH (ref 1.8–7.4)
NEUTROPHILS NFR BLD AUTO: 70.9 % — SIGNIFICANT CHANGE UP (ref 43–77)
NRBC # BLD: 0 /100 WBCS — SIGNIFICANT CHANGE UP (ref 0–0)
PLATELET # BLD AUTO: 261 K/UL — SIGNIFICANT CHANGE UP (ref 150–400)
RBC # BLD: 3.82 M/UL — SIGNIFICANT CHANGE UP (ref 3.8–5.2)
RBC # FLD: 13.5 % — SIGNIFICANT CHANGE UP (ref 10.3–14.5)
WBC # BLD: 10.91 K/UL — HIGH (ref 3.8–10.5)
WBC # FLD AUTO: 10.91 K/UL — HIGH (ref 3.8–10.5)

## 2023-03-06 PROCEDURE — 71045 X-RAY EXAM CHEST 1 VIEW: CPT | Mod: 26

## 2023-03-06 PROCEDURE — 73030 X-RAY EXAM OF SHOULDER: CPT | Mod: 26,RT

## 2023-03-06 PROCEDURE — 99285 EMERGENCY DEPT VISIT HI MDM: CPT | Mod: GC

## 2023-03-06 RX ORDER — LIDOCAINE 4 G/100G
1 CREAM TOPICAL ONCE
Refills: 0 | Status: COMPLETED | OUTPATIENT
Start: 2023-03-06 | End: 2023-03-06

## 2023-03-06 RX ORDER — MORPHINE SULFATE 50 MG/1
2 CAPSULE, EXTENDED RELEASE ORAL ONCE
Refills: 0 | Status: DISCONTINUED | OUTPATIENT
Start: 2023-03-06 | End: 2023-03-06

## 2023-03-06 RX ORDER — ACETAMINOPHEN 500 MG
975 TABLET ORAL ONCE
Refills: 0 | Status: DISCONTINUED | OUTPATIENT
Start: 2023-03-06 | End: 2023-03-10

## 2023-03-06 NOTE — ED PROVIDER NOTE - NSFOLLOWUPINSTRUCTIONS_ED_ALL_ED_FT
You were seen in the Emergency Department for fall.     1) Advance activity as tolerated.   2) Continue all previously prescribed medications as directed.    3) Follow up with your primary care physician in 24-48 hours - take copies of your results.    4) Return to the Emergency Department for worsening or persistent symptoms, and/or ANY NEW OR CONCERNING SYMPTOMS.    there is a fracture of the collar bone. you should keep the sling on to keep the bone stable.     do not use a walker or a cane. please use a wheelchair to prevent any further falls and to avoid putting extra weight on your collar bone       there was thickening in your large intestine which you should see a GI doctor for. this may represent infection or malignancy.     bring this packet with you when you followup with your primary care doctor in the next few days so they know what was done and can give the appropriate follow up.     see your orthopedic doctor this week to monitor your symptoms and get repeat xrays as needed.

## 2023-03-06 NOTE — ED PROVIDER NOTE - NS ED ROS FT
Constitutional: no fevers, chills  HEENT: no HA, vision changes, rhinorrhea, sore throat  Cardiac: no chest pain, palpitations  Respiratory: no SOB, pain with deep breathing, cough or hemoptysis  GI: no n/v/d/c, abd pain, bloody or dark stools  : no dysuria, frequency, or hematuria  MSK: +joint pain, neck pain no back pain  Skin: no rashes, jaundice, pruritis +large bruise  Neuro: no numbness/tingling, +weakness LLE unchanged, no unsteady gait  ROS otherwise negative except per HPI

## 2023-03-06 NOTE — ED ADULT NURSE NOTE - OBJECTIVE STATEMENT
87y Female AOx4 presents to the ED s/p mechanical fall. Pt states at 3pm she was attempting to step onto the sidewalk, lost her footing and fell. Landed on her right arm, hit the back of her head, denies LOC. Takes 81mg ASA daily. Reports going home, but presents to the ED now for increased swelling over right clavicle and pain. Bruising noted over right clavicle. Did not take pain medication prior to ED arrival.  Ambulates independently at baseline. Denies N/V, fever/chills, SOB, chest pain. Spontaneous/unlabored respirations, speaking in full sentences. Side rails up, bed in lowest position, oriented to call bell, safety maintained.

## 2023-03-06 NOTE — ED ADULT TRIAGE NOTE - CHIEF COMPLAINT QUOTE
status post fall x 430 pm fell forward on the sidewalk. (+) head strike, R side chest swelling.  Denies difficulty breathing. neg LOC. Not on AC.

## 2023-03-06 NOTE — ED ADULT NURSE NOTE - NSIMPLEMENTINTERV_GEN_ALL_ED
Implemented All Fall with Harm Risk Interventions:  [place] to call system. Call bell, personal items and telephone within reach. Instruct patient to call for assistance. Room bathroom lighting operational. Non-slip footwear when patient is off stretcher. Physically safe environment: no spills, clutter or unnecessary equipment. Stretcher in lowest position, wheels locked, appropriate side rails in place. Provide visual cue, wrist band, yellow gown, etc. Monitor gait and stability. Monitor for mental status changes and reorient to person, place, and time. Review medications for side effects contributing to fall risk. Reinforce activity limits and safety measures with patient and family. Provide visual clues: red socks.

## 2023-03-06 NOTE — ED PROVIDER NOTE - PHYSICAL EXAMINATION
General: non-toxic, NAD  HEENT: NCAT, PERRL, oropharyngeal AW patent  Cardiac: RRR, no murmurs, 2+ peripheral pulses  Resp: CTAB, normal rate  Abdomen: soft, non-distended, bowel sounds present, no ttp, no rebound or guarding. no organomegaly  MSK: no midline spinal tenderness. no pain with axial loading of bilateral thumbs. no tenderness to palpation of the extremities x4. +ttp clavicular head which seems superiorly displaced. no pain with compression of ribs bilaterally. no peripheral edema, calf tenderness, or leg size discrepancies  Skin: warm and dry no rashes. large ecchymosis over R clavicular head dependent to mid sternum.   Neuro: AAOx4, 5+motor, sensation grossly intact CN 2-12 intact. R arm movement limited secondary to pain. brachial plexus nerves intact.  Psych: mood and affect appropriate

## 2023-03-06 NOTE — ED PROVIDER NOTE - CLINICAL SUMMARY MEDICAL DECISION MAKING FREE TEXT BOX
Elderly female presents after head trauma with large chest ecchymosis concerning for possible clavicular fracture versus dislocation and potential vascular compromise, for which a CT with IV contrast of the chest will be ordered for eval of large vessel tear.  No midline spinal tenderness, or evidence of damage to brachial plexus nerves on initial exam. CT head Noncon due to age.  Likely mechanical fall, will order screening labs and preop's in case there is any vascular compromise.  Pain control reassess.

## 2023-03-06 NOTE — ED PROVIDER NOTE - PATIENT PORTAL LINK FT
You can access the FollowMyHealth Patient Portal offered by [facility] by registering at the following website: [email or web address]. By joining [employer]’s FollowMyHealth portal, you will also be able to view your health information using other applications (apps) compatible with our system.

## 2023-03-06 NOTE — ED ADULT NURSE NOTE - ALCOHOL PRE SCREEN (AUDIT - C)
He called was carrying some totes a couple weeks ago and bumped into a railing on his back/tailbone area. Wanted to see if you would order x-ray at [facility] it is still painful.  
Order placed for lumbar x-ray please fax  
Patient notified, order faxed to [place].  
Statement Selected

## 2023-03-06 NOTE — ED PROVIDER NOTE - ATTENDING CONTRIBUTION TO CARE
Attending Statement (STUART Boo MD):    HPI: 87-year-old female with history of DM–2 HLD HTN IBS Paroxysmal A-tach (not on AC), who presents with right shoulder pain after fall today.  Patient states she has a chronic weakness in the left lower extremity    Review of Systems:  -General: no fever   -ENT: no congestion  -Pulmonary: no cough, no shortness of breath  -Cardiac: no chest pain  -Gastrointestinal: no abdominal pain, no nausea, no vomiting, and no diarrhea.  -Genitourinary: no blood or pain with urination  -Musculoskeletal: see hpi  -Skin: no rashes  -Endocrine: + h/o diabetes  -Neurologic: see hpi    All else negative unless otherwise specified elsewhere in this note.    On Physical Exam:  General: well appearing, in NAD, speaking clearly in full sentences and without difficulty; cooperative with exam  HEENT: anicteric sclera, airway patent  Neck: no JVD  Cardiac: regular, s1 s2  Lungs: CTABL  Abdomen: soft nontender/nondistended  : no bladder tenderness or distension  Skin: intact, no rash  Extremities: no peripheral edema; R shoulder/clvacile tenderness and questionable deformity; RUE radial pulse intact, senation intact throughout,       MDM: concern fro possible R clavicle fracture, possible R rib fractures; given age obtain CT head to evaluate for ICH; obtain screening labs and covid swab in case of admission. disposition pending results of labs/imaging.

## 2023-03-06 NOTE — ED PROVIDER NOTE - PROGRESS NOTE DETAILS
NAVEED Dorsey PGY2 pt feeling well, does not want any stronger pain meds as she only has pain when she moves the extremity. seen and evaluated by attending Rajeev. NAVEED Dorsey PGY2 spoke to granddaughter about CT findings, but was disconnected, unable to discuss patients safety and mobility in the house given her new fracture. will call again NAVEED Dorsey PGY2 CT with age indeterminate rib fractures. spoke to patient about these findings, states that she has known about these fractures from before and is not having any pain with deep breathing. no pain in the area. normally walks with a walker or a cane but has a one story house and has 2 wheelchairs at home that she can use until her collar bone heals. has a grandson at the house that is "always with her" and can help with daily activities until her collar bone heals. NAVEED Dorsey PGY2 reached out again to granddaughter who confirmed that the house is handicap accessible and there is always a grandson/great grandson at the house. granddaughter was made aware of the need for GI and ortho follow up for the thickened colon and clavicular/rib fractures. already has a relationship with an orthopedist.

## 2023-03-06 NOTE — ED PROVIDER NOTE - NSFOLLOWUPCLINICS_GEN_ALL_ED_FT
[facility]  Orthopedic Surgery  [address], 3rd & [address]th [place] [address]  Phone: [phone]  Fax:     Gastroenterology at [facility]  Gastroenterology  [address] [place] [address]  Phone: [phone]  Fax:

## 2023-03-07 VITALS
HEART RATE: 97 BPM | OXYGEN SATURATION: 99 % | RESPIRATION RATE: 17 BRPM | TEMPERATURE: 98 F | SYSTOLIC BLOOD PRESSURE: 163 MMHG | DIASTOLIC BLOOD PRESSURE: 86 MMHG

## 2023-03-07 LAB
ALBUMIN SERPL ELPH-MCNC: 4.6 G/DL — SIGNIFICANT CHANGE UP (ref 3.3–5)
ALP SERPL-CCNC: 68 U/L — SIGNIFICANT CHANGE UP (ref 40–120)
ALT FLD-CCNC: 14 U/L — SIGNIFICANT CHANGE UP (ref 10–45)
ANION GAP SERPL CALC-SCNC: 11 MMOL/L — SIGNIFICANT CHANGE UP (ref 5–17)
APPEARANCE UR: CLEAR — SIGNIFICANT CHANGE UP
APTT BLD: 29.9 SEC — SIGNIFICANT CHANGE UP (ref 27.5–35.5)
AST SERPL-CCNC: 21 U/L — SIGNIFICANT CHANGE UP (ref 10–40)
BACTERIA # UR AUTO: NEGATIVE — SIGNIFICANT CHANGE UP
BILIRUB SERPL-MCNC: 0.3 MG/DL — SIGNIFICANT CHANGE UP (ref 0.2–1.2)
BILIRUB UR-MCNC: NEGATIVE — SIGNIFICANT CHANGE UP
BLD GP AB SCN SERPL QL: NEGATIVE — SIGNIFICANT CHANGE UP
BUN SERPL-MCNC: 20 MG/DL — SIGNIFICANT CHANGE UP (ref 7–23)
CALCIUM SERPL-MCNC: 10.1 MG/DL — SIGNIFICANT CHANGE UP (ref 8.4–10.5)
CHLORIDE SERPL-SCNC: 99 MMOL/L — SIGNIFICANT CHANGE UP (ref 96–108)
CO2 SERPL-SCNC: 30 MMOL/L — SIGNIFICANT CHANGE UP (ref 22–31)
COLOR SPEC: SIGNIFICANT CHANGE UP
CREAT SERPL-MCNC: 1.27 MG/DL — SIGNIFICANT CHANGE UP (ref 0.5–1.3)
DIFF PNL FLD: NEGATIVE — SIGNIFICANT CHANGE UP
EGFR: 41 ML/MIN/1.73M2 — LOW
EPI CELLS # UR: 2 /HPF — SIGNIFICANT CHANGE UP
FLUAV AG NPH QL: SIGNIFICANT CHANGE UP
FLUBV AG NPH QL: SIGNIFICANT CHANGE UP
GLUCOSE SERPL-MCNC: 107 MG/DL — HIGH (ref 70–99)
GLUCOSE UR QL: NEGATIVE — SIGNIFICANT CHANGE UP
HYALINE CASTS # UR AUTO: 1 /LPF — SIGNIFICANT CHANGE UP (ref 0–2)
INR BLD: 0.99 RATIO — SIGNIFICANT CHANGE UP (ref 0.88–1.16)
KETONES UR-MCNC: SIGNIFICANT CHANGE UP
LEUKOCYTE ESTERASE UR-ACNC: ABNORMAL
NITRITE UR-MCNC: NEGATIVE — SIGNIFICANT CHANGE UP
PH UR: 7 — SIGNIFICANT CHANGE UP (ref 5–8)
POTASSIUM SERPL-MCNC: 3.9 MMOL/L — SIGNIFICANT CHANGE UP (ref 3.5–5.3)
POTASSIUM SERPL-SCNC: 3.9 MMOL/L — SIGNIFICANT CHANGE UP (ref 3.5–5.3)
PROT SERPL-MCNC: 8.2 G/DL — SIGNIFICANT CHANGE UP (ref 6–8.3)
PROT UR-MCNC: ABNORMAL
PROTHROM AB SERPL-ACNC: 11.4 SEC — SIGNIFICANT CHANGE UP (ref 10.5–13.4)
RBC CASTS # UR COMP ASSIST: 1 /HPF — SIGNIFICANT CHANGE UP (ref 0–4)
RH IG SCN BLD-IMP: POSITIVE — SIGNIFICANT CHANGE UP
RSV RNA NPH QL NAA+NON-PROBE: SIGNIFICANT CHANGE UP
SARS-COV-2 RNA SPEC QL NAA+PROBE: SIGNIFICANT CHANGE UP
SODIUM SERPL-SCNC: 140 MMOL/L — SIGNIFICANT CHANGE UP (ref 135–145)
SP GR SPEC: 1.01 — SIGNIFICANT CHANGE UP (ref 1.01–1.02)
UROBILINOGEN FLD QL: NEGATIVE — SIGNIFICANT CHANGE UP
WBC UR QL: 4 /HPF — SIGNIFICANT CHANGE UP (ref 0–5)

## 2023-03-07 PROCEDURE — 73080 X-RAY EXAM OF ELBOW: CPT

## 2023-03-07 PROCEDURE — 81001 URINALYSIS AUTO W/SCOPE: CPT

## 2023-03-07 PROCEDURE — 72125 CT NECK SPINE W/O DYE: CPT | Mod: MA

## 2023-03-07 PROCEDURE — 87637 SARSCOV2&INF A&B&RSV AMP PRB: CPT

## 2023-03-07 PROCEDURE — 85730 THROMBOPLASTIN TIME PARTIAL: CPT

## 2023-03-07 PROCEDURE — 80053 COMPREHEN METABOLIC PANEL: CPT

## 2023-03-07 PROCEDURE — 85025 COMPLETE CBC W/AUTO DIFF WBC: CPT

## 2023-03-07 PROCEDURE — 73060 X-RAY EXAM OF HUMERUS: CPT

## 2023-03-07 PROCEDURE — 74177 CT ABD & PELVIS W/CONTRAST: CPT | Mod: MA

## 2023-03-07 PROCEDURE — 74177 CT ABD & PELVIS W/CONTRAST: CPT | Mod: 26,MA

## 2023-03-07 PROCEDURE — 70450 CT HEAD/BRAIN W/O DYE: CPT | Mod: MA

## 2023-03-07 PROCEDURE — 73080 X-RAY EXAM OF ELBOW: CPT | Mod: 26,RT

## 2023-03-07 PROCEDURE — 73030 X-RAY EXAM OF SHOULDER: CPT

## 2023-03-07 PROCEDURE — 87086 URINE CULTURE/COLONY COUNT: CPT

## 2023-03-07 PROCEDURE — 86901 BLOOD TYPING SEROLOGIC RH(D): CPT

## 2023-03-07 PROCEDURE — 73060 X-RAY EXAM OF HUMERUS: CPT | Mod: 26,RT

## 2023-03-07 PROCEDURE — 70450 CT HEAD/BRAIN W/O DYE: CPT | Mod: 26,MA

## 2023-03-07 PROCEDURE — 86900 BLOOD TYPING SEROLOGIC ABO: CPT

## 2023-03-07 PROCEDURE — 72125 CT NECK SPINE W/O DYE: CPT | Mod: 26,MA

## 2023-03-07 PROCEDURE — 71260 CT THORAX DX C+: CPT | Mod: MA

## 2023-03-07 PROCEDURE — 71045 X-RAY EXAM CHEST 1 VIEW: CPT

## 2023-03-07 PROCEDURE — 71260 CT THORAX DX C+: CPT | Mod: 26,MA

## 2023-03-07 PROCEDURE — 87186 SC STD MICRODIL/AGAR DIL: CPT

## 2023-03-07 PROCEDURE — 85610 PROTHROMBIN TIME: CPT

## 2023-03-07 PROCEDURE — 99285 EMERGENCY DEPT VISIT HI MDM: CPT | Mod: 25

## 2023-03-07 PROCEDURE — 86850 RBC ANTIBODY SCREEN: CPT

## 2023-03-07 NOTE — ED ADULT NURSE REASSESSMENT NOTE - NS ED NURSE REASSESS COMMENT FT1
VSS. Pt endorses improvement in right arm pain. Pt continues to state she does not want any medication, verbalized understanding to notify RN if pain worsens.

## 2023-03-08 NOTE — ED POST DISCHARGE NOTE - RESULT SUMMARY
CT Incidental findings - several findings it appears GI recommended for some of the findings, would encourage pcp follow up given number of incidentals that may require monitoring. - Xuan Frye PA-C

## 2023-03-08 NOTE — ED POST DISCHARGE NOTE - DETAILS
3/8: m to call back admin line - Xuan Frye PA-C 3/9: Spoke to patient states she is aware to f/up with GI for related incidentals and has f/up appt. Made aware she should also see PCP about incidental thyroid findings. Discussed Prelim urine. Pt UA appeared neg, but CT questioned possible UTI and pt w/ freq. Pt agreeable to starting abx. Augmentin sent to [facility] - Yessenia Mascorro PA-C

## 2023-04-05 ENCOUNTER — NON-APPOINTMENT (OUTPATIENT)
Age: 88
End: 2023-04-05

## 2023-04-07 ENCOUNTER — NON-APPOINTMENT (OUTPATIENT)
Age: 88
End: 2023-04-07

## 2023-04-13 ENCOUNTER — APPOINTMENT (OUTPATIENT)
Dept: GASTROENTEROLOGY | Facility: CLINIC | Age: 88
End: 2023-04-13

## 2023-04-20 ENCOUNTER — APPOINTMENT (OUTPATIENT)
Dept: ULTRASOUND IMAGING | Facility: CLINIC | Age: 88
End: 2023-04-20
Payer: MEDICARE

## 2023-04-20 ENCOUNTER — OUTPATIENT (OUTPATIENT)
Dept: OUTPATIENT SERVICES | Facility: HOSPITAL | Age: 88
LOS: 1 days | End: 2023-04-20
Payer: MEDICARE

## 2023-04-20 DIAGNOSIS — N28.1 CYST OF KIDNEY, ACQUIRED: ICD-10-CM

## 2023-04-20 PROCEDURE — 76770 US EXAM ABDO BACK WALL COMP: CPT | Mod: 26

## 2023-04-20 PROCEDURE — 76770 US EXAM ABDO BACK WALL COMP: CPT

## 2023-04-24 ENCOUNTER — APPOINTMENT (OUTPATIENT)
Dept: UROLOGY | Facility: CLINIC | Age: 88
End: 2023-04-24
Payer: MEDICARE

## 2023-04-24 VITALS
WEIGHT: 133 LBS | HEART RATE: 66 BPM | DIASTOLIC BLOOD PRESSURE: 69 MMHG | BODY MASS INDEX: 20.63 KG/M2 | SYSTOLIC BLOOD PRESSURE: 179 MMHG | HEIGHT: 67.5 IN | RESPIRATION RATE: 16 BRPM

## 2023-04-24 DIAGNOSIS — N28.1 CYST OF KIDNEY, ACQUIRED: ICD-10-CM

## 2023-04-24 PROCEDURE — 99213 OFFICE O/P EST LOW 20 MIN: CPT

## 2023-04-30 PROBLEM — N28.1 COMPLEX RENAL CYST: Status: ACTIVE | Noted: 2019-03-05

## 2023-04-30 NOTE — HISTORY OF PRESENT ILLNESS
[FreeTextEntry1] : Patient is a 87 year old woman comes in today for complex renal cyst\par \par States she went to urgent care because she was not feeling right. \par Was told she had a UTI, completed 5 days of Keflex \par Final culture on 4/6/23 was negative \par \par S/P fall and fractured her clavicle in March 2023 \par \par Denies any increased urgency or frequency. Denies any hematuria, dysuria or incontinence. \par \par January 31st 2023 Creatinine 1.3 \par \par Renal US performed on 4/20/23, right kidney,- 1.9 cm simple cyst, 2.8 cm parapelvic cyst\par Left kidney- 4.3 cm simple cyst \par

## 2023-04-30 NOTE — ASSESSMENT
[FreeTextEntry1] : Renal US images reviewed.\par Explained that cysts with minimal to no complexity. Stable in size.\par Does not need repeat imaging.\par Follow up as needed.

## 2023-05-05 ENCOUNTER — APPOINTMENT (OUTPATIENT)
Dept: GASTROENTEROLOGY | Facility: CLINIC | Age: 88
End: 2023-05-05
Payer: MEDICARE

## 2023-05-05 VITALS
TEMPERATURE: 97.5 F | BODY MASS INDEX: 20.01 KG/M2 | DIASTOLIC BLOOD PRESSURE: 70 MMHG | OXYGEN SATURATION: 95 % | SYSTOLIC BLOOD PRESSURE: 164 MMHG | HEART RATE: 69 BPM | WEIGHT: 129 LBS | HEIGHT: 67.5 IN

## 2023-05-05 DIAGNOSIS — R41.3 OTHER AMNESIA: ICD-10-CM

## 2023-05-05 DIAGNOSIS — K21.9 GASTRO-ESOPHAGEAL REFLUX DISEASE W/OUT ESOPHAGITIS: ICD-10-CM

## 2023-05-05 DIAGNOSIS — Z87.891 PERSONAL HISTORY OF NICOTINE DEPENDENCE: ICD-10-CM

## 2023-05-05 DIAGNOSIS — Z83.511 FAMILY HISTORY OF GLAUCOMA: ICD-10-CM

## 2023-05-05 DIAGNOSIS — I34.1 NONRHEUMATIC MITRAL (VALVE) PROLAPSE: ICD-10-CM

## 2023-05-05 DIAGNOSIS — Z82.3 FAMILY HISTORY OF STROKE: ICD-10-CM

## 2023-05-05 DIAGNOSIS — Z82.5 FAMILY HISTORY OF ASTHMA AND OTHER CHRONIC LOWER RESPIRATORY DISEASES: ICD-10-CM

## 2023-05-05 DIAGNOSIS — K21.00 GASTRO-ESOPHAGEAL REFLUX DISEASE WITH ESOPHAGITIS, WITHOUT BLEEDING: ICD-10-CM

## 2023-05-05 PROCEDURE — 99214 OFFICE O/P EST MOD 30 MIN: CPT

## 2023-05-05 NOTE — PHYSICAL EXAM
[Alert] : alert [No Acute Distress] : no acute distress [No Respiratory Distress] : no respiratory distress [Auscultation Breath Sounds / Voice Sounds] : lungs were clear to auscultation bilaterally [Heart Rate And Rhythm] : heart rate was normal and rhythm regular [Bowel Sounds] : normal bowel sounds [Abdomen Tenderness] : non-tender [Abdomen Soft] : soft [] : no hepatosplenomegaly

## 2023-05-05 NOTE — ASSESSMENT
[FreeTextEntry1] : Mrs. Wise is an 87-year-old female with several significant comorbid conditions.  From the gastrointestinal perspective the patient suffers from reflux which is well controlled by acid reducing medication.  There is no current regurgitation,  dysphagia or abdominal pain.  Patient has a known hiatal hernia.  The patient's bowel movements are currently acceptable with fiber and a probiotic.  The patient had CAT scan findings which are nonspecific.  They were likely on the basis of underdistention of the bowels.  In the absence of significant GI symptoms that could be correlated with the findings,  I feel that no invasive testing is indicated at the present time.  The risks of sedation outweigh the benefits.  Subha promised that she will get back to me with any acute changes in her gastrointestinal status.  In any event, the patient is not receptive to an invasive GI work-up at the present time.

## 2023-05-05 NOTE — HISTORY OF PRESENT ILLNESS
The patient is a 2y6m Female complaining of abdominal pain. [FreeTextEntry1] : I saw Subha Wise in the office today.  The patient is an 87-year-old female who has been followed over the years for colorectal cancer screening as well as gastroesophageal reflux disease.  The patient was also followed for pancreatic abnormalities with no further surveillance indicated.  The patient has a known history of hypertension hyperlipidemia and atrial fibrillation.  Subha had an innocent fall and fractured her right clavicle.  The patient has been undergoing physical therapy and has an increased range of motion in her right arm.  While in the hospital the patient underwent full body CAT scans and was found to have some nonspecific thickening of the esophagus and stomach as well as the right side of the colon.  Patient did not take oral contrast at that time.  The patient has known diverticulosis and her bowel movements are fairly well regulated using fiber supplements and a probiotic.  There is no blood in the stool or on the toilet tissue.  The patient's hemoglobin is in the 11 range which is her baseline.  Subha is not complaining of any dysphagia or chronic dyspepsia at the present time.  Did lose some weight after her recent injury.  The patient has 1 caffeinated beverage a day no ethanol and no tobacco.  Subha  had a colonoscopy done less than 10 years ago.

## 2023-05-05 NOTE — REVIEW OF SYSTEMS
[Fever] : no fever [Chills] : no chills [Feeling Tired] : feeling tired [Recent Weight Loss (___ Lbs)] : recent [unfilled] ~Ulb weight loss [Chest Pain] : no chest pain [Palpitations] : no palpitations [Wheezing] : no wheezing [Cough] : no cough [SOB on Exertion] : no shortness of breath during exertion [Abdominal Pain] : no abdominal pain [Constipation] : constipation [Diarrhea] : no diarrhea [Heartburn] : no heartburn [Melena (black stool)] : no melena [Bloating (gassiness)] : no bloating

## 2023-05-10 NOTE — DISCHARGE NOTE PROVIDER - CARE PROVIDER_API CALL
[facility]      Ernesto Castelan         [id number]  5/10/2023   Chief Complaint   Patient presents with    Follow-up     AWAIS 3 month f/u with DL. Pt of Dr. Stefanie Candelario    PAP Download:   Original or initial AHI: 26.4     Date of initial study: 9/20/22      Compliant  97%     Noncompliant 3 %     PAP Type AutoSet Level  8/16 cmH2O   Avg Hrs/Day 7hrs 20mins  AHI: 1.8   Recorded compliance dates , 4/10/23  to 5/9/23   Machine/Mfg:   [x] ResMed    [] Respironics/Dreamstation   Interface:   [x] Nasal    [] Nasal pillows   [] FFM      Provider:      [x] -FRANKLIN     []Татьяна     [] Rafat    [] Micki Rasheed    [] Angelesermans               [] [employer] [facility]      [] Adaptive    [] [address]      [] Other    Neck Size: 16.5  Mallampati 4  ESS:  8  SAQLI: 75    Here is a scan of the most recent download:                Presentation:   Jayna Pace presents for sleep medicine follow up for obstructive sleep apnea  Since the last visit, Jayna Pace has been compliant with his PAP therapy and is seeing benefit from its use. Awake and alert today  NO sleep medication use  Controlled AHI    Weight stable / unchanged    Equipment issues: The pressure is  acceptable, the mask is acceptable     Sleep issues:  Do you feel better? Yes  More rested? Yes   Better concentration? yes  Difficulty falling sleep? No  Difficulty staying asleep? No  Snoring? No    Progress History:   Since last visit any new medical issues? No  New ER or hospital visits? No  Any new or changes in medicines? No  Any new sleep medicines? No    Review of Systems -   Review of Systems   Constitutional: Negative. Negative for chills, fever and unexpected weight change. HENT: Negative. Eyes: Negative. Respiratory: Negative. Negative for chest tightness, wheezing and stridor. Cardiovascular:  Negative for chest pain and leg swelling. Gastrointestinal: Negative. Negative for diarrhea, nausea and vomiting.    Endocrine: [place])  Internal Medicine; Nephrology  [address] [facility], [address]nd [place] [address]  Phone: [phone]  Fax: [phone]  Follow Up Time:     Yobani Stephenson)  Cardiology; Internal Medicine  [address] [facility], [address]  [place] [address]  Phone: [phone]  Fax: [phone]  Follow Up Time:

## 2023-05-24 ENCOUNTER — APPOINTMENT (OUTPATIENT)
Dept: OBGYN | Facility: CLINIC | Age: 88
End: 2023-05-24
Payer: MEDICARE

## 2023-05-24 VITALS
BODY MASS INDEX: 20.01 KG/M2 | DIASTOLIC BLOOD PRESSURE: 70 MMHG | HEIGHT: 67.5 IN | WEIGHT: 129 LBS | SYSTOLIC BLOOD PRESSURE: 141 MMHG

## 2023-05-24 DIAGNOSIS — Z01.419 ENCOUNTER FOR GYNECOLOGICAL EXAMINATION (GENERAL) (ROUTINE) W/OUT ABNORMAL FINDINGS: ICD-10-CM

## 2023-05-24 PROCEDURE — G0101: CPT

## 2023-05-24 NOTE — HISTORY OF PRESENT ILLNESS
[FreeTextEntry1] : 87 year old female presents for an annual. Pt is doing well, also reports vaginal dryness - tried Replens. Notes that she did have a recent fall and was hospitalized. [Mammogramdate] : 01/23

## 2023-05-24 NOTE — PLAN
[FreeTextEntry1] : 87 year old female presents for annual\par \par -PAP done today \par -Recommended coconut oil

## 2023-05-25 LAB — HPV HIGH+LOW RISK DNA PNL CVX: NOT DETECTED

## 2023-05-30 LAB — CYTOLOGY CVX/VAG DOC THIN PREP: ABNORMAL

## 2023-06-06 ENCOUNTER — NON-APPOINTMENT (OUTPATIENT)
Age: 88
End: 2023-06-06

## 2023-06-06 ENCOUNTER — APPOINTMENT (OUTPATIENT)
Dept: CARDIOLOGY | Facility: CLINIC | Age: 88
End: 2023-06-06
Payer: MEDICARE

## 2023-06-06 VITALS
HEART RATE: 65 BPM | BODY MASS INDEX: 19.6 KG/M2 | SYSTOLIC BLOOD PRESSURE: 127 MMHG | DIASTOLIC BLOOD PRESSURE: 71 MMHG | WEIGHT: 127 LBS | OXYGEN SATURATION: 95 %

## 2023-06-06 PROCEDURE — 99214 OFFICE O/P EST MOD 30 MIN: CPT

## 2023-06-06 PROCEDURE — 93000 ELECTROCARDIOGRAM COMPLETE: CPT

## 2023-06-06 RX ORDER — AMIODARONE HYDROCHLORIDE 200 MG/1
200 TABLET ORAL DAILY
Qty: 45 | Refills: 3 | Status: ACTIVE | COMMUNITY
Start: 2018-10-23 | End: 1900-01-01

## 2023-06-06 RX ORDER — ROSUVASTATIN CALCIUM 20 MG/1
20 TABLET, FILM COATED ORAL
Qty: 90 | Refills: 3 | Status: ACTIVE | COMMUNITY
Start: 2021-07-10 | End: 1900-01-01

## 2023-06-06 NOTE — DISCUSSION/SUMMARY
[EKG obtained to assist in diagnosis and management of assessed problem(s)] : EKG obtained to assist in diagnosis and management of assessed problem(s) [FreeTextEntry1] : The patient was examined. Her blood pressure was 127/71,and her pulse was 65..Her lungs were clear to auscultation. Cardiac exam was  negative for murmurs rubs or gallops.  The  remainder of her  physical exam was  unremarkable. Her EKG showed sinus rhythm, poor R wave progression in leads V1 and V2, and no acute changes..  The patient should stay on her current medication.She was told to  return in 4 months, or earlier if needed.  Total time spent on the day of the encounter was 35 minutes which includes  face-to-face and non face-to-face times personally spent by the physician preparing to see the patient, obtaining  separately obtained history, performing a medically appropriate exam and evaluation, counseling, educating, talking to the family or caregivers, ordering medicines, ordering tests or procedures, referring and communicating with other healthcare professionals, and documenting clinical information in the electronic health record.

## 2023-06-06 NOTE — REASON FOR VISIT
[FreeTextEntry1] : The patient comes in for 2 separate reasons.  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  In addition, she needs medical clearance for possible repeat surgery on her ear.  She denies any chest pains, shortness of breath, or palpitations.\par March 10, 2021: Patient had surgery on her right toes 3 months ago when she is still walking around in a boot for her right foot.  She has been having some pains in her legs and she is worried that she has a deep vein thrombosis.  She denies any swelling.\par May 18, 2021: The patient has no new complaints.  She denies any chest pains, shortness of breath, or palpitations.  She needs forms filled out for her employment.  She also needs a PPD planted.\par August 18, 2021: The patient has no new complaints.  She denies any chest pains, palpitations, or shortness of breath.\par December 15, 2021: Patient is under stress.  She gets pains in the back of her head.  She does go to her neurologist.  She denies any chest pains, shortness of breath, or palpitations.\par April 20, 2022: The patient needs a form filled out because she takes care of her grandchild.  She needs titers for immunity to rubella and rubeola and colloidal gold titer to make sure she does not have tuberculosis.  She had an episode of low back pain and went to her orthopedist who gave her steroid injection and she is feeling better.\par May 19, 2022: The patient was hospitalized at [facility] with bilateral leg pain and dehydration.  She was in the hospital from May 8, 2022 to May 13, 2022.  They stopped her telmisartan.  She has now noticed that both ankles are beginning to swell.  She is drinking a lot of fluid\par September 7, 2022: The patient has no new complaints.\par 2/7/23 No new complaints\par June 6, 2023: On April 3 the patient had a mechanical trip and fall.  She broke her right clavicle.  She has had some vertigo but she went to her nearest ear nose and throat doctor and he cleaned out the ear and she is better.  She denies any chest pains, shortness of breath, or palpitations.\par \par

## 2023-07-19 ENCOUNTER — APPOINTMENT (OUTPATIENT)
Dept: NEPHROLOGY | Facility: CLINIC | Age: 88
End: 2023-07-19
Payer: MEDICARE

## 2023-07-19 DIAGNOSIS — R80.1 PERSISTENT PROTEINURIA, UNSPECIFIED: ICD-10-CM

## 2023-07-19 PROCEDURE — 99213 OFFICE O/P EST LOW 20 MIN: CPT | Mod: 95

## 2023-07-19 RX ORDER — FAMOTIDINE 20 MG/1
20 TABLET, FILM COATED ORAL DAILY
Qty: 90 | Refills: 3 | Status: DISCONTINUED | COMMUNITY
Start: 2022-12-23 | End: 2023-07-19

## 2023-07-19 RX ORDER — OMEPRAZOLE 20 MG/1
20 CAPSULE, DELAYED RELEASE ORAL
Qty: 90 | Refills: 3 | Status: DISCONTINUED | COMMUNITY
Start: 2021-12-23 | End: 2023-07-19

## 2023-07-19 RX ORDER — OMEGA-3/DHA/EPA/FISH OIL 300-1000MG
CAPSULE ORAL
Refills: 0 | Status: DISCONTINUED | COMMUNITY
End: 2023-07-19

## 2023-07-20 NOTE — ASSESSMENT
[FreeTextEntry1] : \par \par DM, HTN, CKD \par  SHAZIA last year  secondary to Rhabdomyolysis likely from Rosuvastatin and ARB; and hypoglycemia on Glipizide with renal failure. \par -she is back on Rosuvastatin by PMD. \par -labs from 2/1/2023 cr 1.35 eGFR 45. no anemia \par -labs sent for renal panel and urine studies, \par \par \par RTC in 6  months \par \par .

## 2023-07-20 NOTE — REVIEW OF SYSTEMS
[Lower Ext Edema] : no lower extremity edema [As Noted in HPI] : as noted in HPI [de-identified] : memory loss

## 2023-07-20 NOTE — HISTORY OF PRESENT ILLNESS
[FreeTextEntry1] : on amiodarone for cardiomyopathy\par no swelling \par feels good \par she is back on rosuvastatin \par s/p fall in April \par taking care of her grandson \par \par

## 2023-07-21 LAB
ALBUMIN SERPL ELPH-MCNC: 4.4 G/DL
ANION GAP SERPL CALC-SCNC: 12 MMOL/L
BUN SERPL-MCNC: 17 MG/DL
CALCIUM SERPL-MCNC: 9.6 MG/DL
CHLORIDE SERPL-SCNC: 103 MMOL/L
CO2 SERPL-SCNC: 28 MMOL/L
CREAT SERPL-MCNC: 1.24 MG/DL
CREAT SPEC-SCNC: 82 MG/DL
CREAT/PROT UR: 0.5 RATIO
EGFR: 42 ML/MIN/1.73M2
GLUCOSE SERPL-MCNC: 107 MG/DL
PHOSPHATE SERPL-MCNC: 3.4 MG/DL
POTASSIUM SERPL-SCNC: 4.3 MMOL/L
PROT UR-MCNC: 42 MG/DL
SODIUM SERPL-SCNC: 142 MMOL/L

## 2023-08-28 DIAGNOSIS — Z12.39 ENCOUNTER FOR OTHER SCREENING FOR MALIGNANT NEOPLASM OF BREAST: ICD-10-CM

## 2023-08-30 DIAGNOSIS — N64.4 MASTODYNIA: ICD-10-CM

## 2023-08-30 DIAGNOSIS — N63.0 UNSPECIFIED LUMP IN UNSPECIFIED BREAST: ICD-10-CM

## 2023-09-04 ENCOUNTER — NON-APPOINTMENT (OUTPATIENT)
Age: 88
End: 2023-09-04

## 2023-09-11 ENCOUNTER — APPOINTMENT (OUTPATIENT)
Dept: UROLOGY | Facility: CLINIC | Age: 88
End: 2023-09-11
Payer: MEDICARE

## 2023-09-11 VITALS — HEART RATE: 63 BPM | SYSTOLIC BLOOD PRESSURE: 191 MMHG | DIASTOLIC BLOOD PRESSURE: 75 MMHG

## 2023-09-11 PROCEDURE — 99214 OFFICE O/P EST MOD 30 MIN: CPT

## 2023-09-11 RX ORDER — ESTRADIOL 0.1 MG/G
0.1 CREAM VAGINAL
Qty: 42.5 | Refills: 11 | Status: ACTIVE | COMMUNITY
Start: 2023-09-11 | End: 1900-01-01

## 2023-09-20 ENCOUNTER — APPOINTMENT (OUTPATIENT)
Dept: ULTRASOUND IMAGING | Facility: CLINIC | Age: 88
End: 2023-09-20
Payer: MEDICARE

## 2023-09-20 ENCOUNTER — OUTPATIENT (OUTPATIENT)
Dept: OUTPATIENT SERVICES | Facility: HOSPITAL | Age: 88
LOS: 1 days | End: 2023-09-20
Payer: MEDICARE

## 2023-09-20 DIAGNOSIS — N28.1 CYST OF KIDNEY, ACQUIRED: ICD-10-CM

## 2023-09-20 PROCEDURE — 76770 US EXAM ABDO BACK WALL COMP: CPT | Mod: 26

## 2023-09-20 PROCEDURE — 76770 US EXAM ABDO BACK WALL COMP: CPT

## 2023-09-25 ENCOUNTER — APPOINTMENT (OUTPATIENT)
Dept: UROLOGY | Facility: CLINIC | Age: 88
End: 2023-09-25
Payer: MEDICARE

## 2023-09-25 VITALS — HEART RATE: 63 BPM | SYSTOLIC BLOOD PRESSURE: 140 MMHG | DIASTOLIC BLOOD PRESSURE: 70 MMHG

## 2023-09-25 DIAGNOSIS — N95.8 OTHER SPECIFIED MENOPAUSAL AND PERIMENOPAUSAL DISORDERS: ICD-10-CM

## 2023-09-25 PROCEDURE — 51798 US URINE CAPACITY MEASURE: CPT

## 2023-09-25 PROCEDURE — 99215 OFFICE O/P EST HI 40 MIN: CPT

## 2023-10-03 ENCOUNTER — APPOINTMENT (OUTPATIENT)
Dept: CARDIOLOGY | Facility: CLINIC | Age: 88
End: 2023-10-03
Payer: MEDICARE

## 2023-10-03 ENCOUNTER — NON-APPOINTMENT (OUTPATIENT)
Age: 88
End: 2023-10-03

## 2023-10-03 VITALS — WEIGHT: 129 LBS | BODY MASS INDEX: 19.91 KG/M2

## 2023-10-03 VITALS — SYSTOLIC BLOOD PRESSURE: 142 MMHG | DIASTOLIC BLOOD PRESSURE: 72 MMHG | OXYGEN SATURATION: 97 % | HEART RATE: 64 BPM

## 2023-10-03 DIAGNOSIS — Z00.00 ENCOUNTER FOR GENERAL ADULT MEDICAL EXAMINATION W/OUT ABNORMAL FINDINGS: ICD-10-CM

## 2023-10-03 PROCEDURE — 99214 OFFICE O/P EST MOD 30 MIN: CPT

## 2023-10-03 PROCEDURE — 93000 ELECTROCARDIOGRAM COMPLETE: CPT

## 2023-11-16 ENCOUNTER — NON-APPOINTMENT (OUTPATIENT)
Age: 88
End: 2023-11-16

## 2023-12-04 NOTE — OBJECTIVE
Patient: Arie Hayden    Procedure Summary       Date: 12/04/23 Room / Location:  [facility] /  [facility]    Anesthesia Start: 0810 Anesthesia Stop: 1045    Procedure: OPEN INCISIONAL HERNIA REPAIR WITH MESH (Abdomen) Diagnosis:     Surgeons: Royer Catalan MD Provider: Akbar Gant MD    Anesthesia Type: general with block ASA Status: 2            Anesthesia Type: general with block    Vitals  Vitals Value Taken Time   /85 12/04/23 1115   Temp 98 °F (36.7 °C) 12/04/23 1115   Pulse 73 12/04/23 1130   Resp 18 12/04/23 1115   SpO2 95 % 12/04/23 1130           Post Anesthesia Care and Evaluation    Patient location during evaluation: PACU  Patient participation: complete - patient participated  Level of consciousness: awake and alert  Pain management: adequate    Airway patency: patent  Anesthetic complications: No anesthetic complications  PONV Status: none  Cardiovascular status: hemodynamically stable and acceptable  Respiratory status: nonlabored ventilation, acceptable and nasal cannula  Hydration status: acceptable       [History reviewed] : History reviewed. [Medications and Allergies reviewed] : Medications and allergies reviewed.

## 2023-12-21 LAB
ALBUMIN SERPL ELPH-MCNC: 4.3 G/DL
ALP BLD-CCNC: 67 U/L
ALT SERPL-CCNC: 13 U/L
ANION GAP SERPL CALC-SCNC: 10 MMOL/L
AST SERPL-CCNC: 25 U/L
BILIRUB SERPL-MCNC: 0.3 MG/DL
BUN SERPL-MCNC: 18 MG/DL
CALCIUM SERPL-MCNC: 9.5 MG/DL
CHLORIDE SERPL-SCNC: 102 MMOL/L
CHOLEST SERPL-MCNC: 170 MG/DL
CO2 SERPL-SCNC: 29 MMOL/L
CREAT SERPL-MCNC: 1.35 MG/DL
EGFR: 38 ML/MIN/1.73M2
ESTIMATED AVERAGE GLUCOSE: 143 MG/DL
GLUCOSE SERPL-MCNC: 97 MG/DL
HBA1C MFR BLD HPLC: 6.6 %
HCT VFR BLD CALC: 36.1 %
HDLC SERPL-MCNC: 75 MG/DL
HGB BLD-MCNC: 11.8 G/DL
LDLC SERPL CALC-MCNC: 83 MG/DL
MCHC RBC-ENTMCNC: 31.1 PG
MCHC RBC-ENTMCNC: 32.7 GM/DL
MCV RBC AUTO: 95.3 FL
NONHDLC SERPL-MCNC: 95 MG/DL
PLATELET # BLD AUTO: 282 K/UL
POTASSIUM SERPL-SCNC: 4.6 MMOL/L
PROT SERPL-MCNC: 7.3 G/DL
RBC # BLD: 3.79 M/UL
RBC # FLD: 13.8 %
SODIUM SERPL-SCNC: 141 MMOL/L
T4 FREE SERPL-MCNC: 1.5 NG/DL
TRIGL SERPL-MCNC: 61 MG/DL
TSH SERPL-ACNC: 1.27 UIU/ML
WBC # FLD AUTO: 7.28 K/UL

## 2023-12-26 ENCOUNTER — NON-APPOINTMENT (OUTPATIENT)
Age: 88
End: 2023-12-26

## 2023-12-27 LAB
APPEARANCE: CLEAR
BACTERIA: NEGATIVE /HPF
BILIRUBIN URINE: NEGATIVE
BLOOD URINE: NEGATIVE
CAST: 1 /LPF
COLOR: YELLOW
EPITHELIAL CELLS: 6 /HPF
GLUCOSE QUALITATIVE U: 100 MG/DL
KETONES URINE: NEGATIVE MG/DL
LEUKOCYTE ESTERASE URINE: ABNORMAL
MICROSCOPIC-UA: NORMAL
NITRITE URINE: NEGATIVE
PH URINE: 5.5
PROTEIN URINE: 30 MG/DL
RED BLOOD CELLS URINE: 1 /HPF
SPECIFIC GRAVITY URINE: 1.02
UROBILINOGEN URINE: 0.2 MG/DL
WHITE BLOOD CELLS URINE: 6 /HPF

## 2023-12-28 LAB — BACTERIA UR CULT: NORMAL

## 2023-12-28 RX ORDER — TAMSULOSIN HYDROCHLORIDE 0.4 MG/1
0.4 CAPSULE ORAL
Qty: 90 | Refills: 3 | Status: ACTIVE | COMMUNITY
Start: 2023-12-28 | End: 1900-01-01

## 2024-01-09 ENCOUNTER — NON-APPOINTMENT (OUTPATIENT)
Age: 89
End: 2024-01-09

## 2024-01-09 ENCOUNTER — APPOINTMENT (OUTPATIENT)
Dept: CARDIOLOGY | Facility: CLINIC | Age: 89
End: 2024-01-09
Payer: MEDICARE

## 2024-01-09 VITALS
DIASTOLIC BLOOD PRESSURE: 64 MMHG | BODY MASS INDEX: 20.24 KG/M2 | SYSTOLIC BLOOD PRESSURE: 140 MMHG | HEIGHT: 67.5 IN | OXYGEN SATURATION: 100 % | WEIGHT: 130.5 LBS | HEART RATE: 65 BPM

## 2024-01-09 DIAGNOSIS — D64.9 ANEMIA, UNSPECIFIED: ICD-10-CM

## 2024-01-09 DIAGNOSIS — N39.0 URINARY TRACT INFECTION, SITE NOT SPECIFIED: ICD-10-CM

## 2024-01-09 PROCEDURE — 99214 OFFICE O/P EST MOD 30 MIN: CPT

## 2024-01-09 PROCEDURE — 93000 ELECTROCARDIOGRAM COMPLETE: CPT

## 2024-01-22 ENCOUNTER — RESULT REVIEW (OUTPATIENT)
Age: 89
End: 2024-01-22

## 2024-01-22 ENCOUNTER — OUTPATIENT (OUTPATIENT)
Dept: OUTPATIENT SERVICES | Facility: HOSPITAL | Age: 89
LOS: 1 days | End: 2024-01-22
Payer: MEDICARE

## 2024-01-22 ENCOUNTER — APPOINTMENT (OUTPATIENT)
Dept: MAMMOGRAPHY | Facility: CLINIC | Age: 89
End: 2024-01-22
Payer: MEDICARE

## 2024-01-22 DIAGNOSIS — N64.4 MASTODYNIA: ICD-10-CM

## 2024-01-22 DIAGNOSIS — Z12.39 ENCOUNTER FOR OTHER SCREENING FOR MALIGNANT NEOPLASM OF BREAST: ICD-10-CM

## 2024-01-22 DIAGNOSIS — N63.0 UNSPECIFIED LUMP IN UNSPECIFIED BREAST: ICD-10-CM

## 2024-01-22 PROCEDURE — 77063 BREAST TOMOSYNTHESIS BI: CPT

## 2024-01-22 PROCEDURE — 77067 SCR MAMMO BI INCL CAD: CPT | Mod: 26

## 2024-01-22 PROCEDURE — 77063 BREAST TOMOSYNTHESIS BI: CPT | Mod: 26

## 2024-01-22 PROCEDURE — 77067 SCR MAMMO BI INCL CAD: CPT

## 2024-02-06 NOTE — DISCHARGE NOTE PROVIDER - NSDCHOSPICE_GEN_A_CORE
No
For information on Fall & Injury Prevention, visit: [email or web address] OR  [email or web address] OR  https://www.cdc.gov/steadi/patient.html

## 2024-03-08 NOTE — PATIENT PROFILE ADULT - FLU SEASON?
[FreeTextEntry2] : follow up- right shoulder. Reports improvement from csi last visit and PT / HEP. notes he is still having pain with bicep exercises.
No

## 2024-03-27 ENCOUNTER — APPOINTMENT (OUTPATIENT)
Dept: CT IMAGING | Facility: CLINIC | Age: 89
End: 2024-03-27
Payer: MEDICARE

## 2024-03-27 ENCOUNTER — OUTPATIENT (OUTPATIENT)
Dept: OUTPATIENT SERVICES | Facility: HOSPITAL | Age: 89
LOS: 1 days | End: 2024-03-27
Payer: MEDICARE

## 2024-03-27 DIAGNOSIS — D44.7 NEOPLASM OF UNCERTAIN BEHAVIOR OF AORTIC BODY AND OTHER PARAGANGLIA: ICD-10-CM

## 2024-03-27 PROCEDURE — 70480 CT ORBIT/EAR/FOSSA W/O DYE: CPT | Mod: 26,MH

## 2024-03-27 PROCEDURE — 70480 CT ORBIT/EAR/FOSSA W/O DYE: CPT | Mod: MH

## 2024-04-01 ENCOUNTER — APPOINTMENT (OUTPATIENT)
Dept: UROLOGY | Facility: CLINIC | Age: 89
End: 2024-04-01

## 2024-04-24 ENCOUNTER — OFFICE (OUTPATIENT)
Dept: URBAN - METROPOLITAN AREA CLINIC 35 | Facility: CLINIC | Age: 89
Setting detail: OPHTHALMOLOGY
End: 2024-04-24
Payer: MEDICARE

## 2024-04-24 DIAGNOSIS — H52.4: ICD-10-CM

## 2024-04-24 DIAGNOSIS — H35.373: ICD-10-CM

## 2024-04-24 PROBLEM — E11.9 DIABETES TYPE 2 NO RETINOPATHY: Status: ACTIVE | Noted: 2024-04-24

## 2024-04-24 PROCEDURE — 99203 OFFICE O/P NEW LOW 30 MIN: CPT | Performed by: OPHTHALMOLOGY

## 2024-04-24 PROCEDURE — 92134 CPTRZ OPH DX IMG PST SGM RTA: CPT | Performed by: OPHTHALMOLOGY

## 2024-04-24 PROCEDURE — 92015 DETERMINE REFRACTIVE STATE: CPT | Performed by: OPHTHALMOLOGY

## 2024-06-03 ENCOUNTER — APPOINTMENT (OUTPATIENT)
Dept: GASTROENTEROLOGY | Facility: CLINIC | Age: 89
End: 2024-06-03
Payer: MEDICARE

## 2024-06-03 VITALS
TEMPERATURE: 96.8 F | DIASTOLIC BLOOD PRESSURE: 72 MMHG | HEIGHT: 67.5 IN | OXYGEN SATURATION: 98 % | SYSTOLIC BLOOD PRESSURE: 130 MMHG | HEART RATE: 72 BPM | BODY MASS INDEX: 19.7 KG/M2 | WEIGHT: 127 LBS

## 2024-06-03 DIAGNOSIS — Z82.49 FAMILY HISTORY OF ISCHEMIC HEART DISEASE AND OTHER DISEASES OF THE CIRCULATORY SYSTEM: ICD-10-CM

## 2024-06-03 DIAGNOSIS — I47.19 OTHER SUPRAVENTRICULAR TACHYCARDIA: ICD-10-CM

## 2024-06-03 DIAGNOSIS — K57.90 DIVERTICULOSIS OF INTESTINE, PART UNSPECIFIED, W/OUT PERFORATION OR ABSCESS W/OUT BLEEDING: ICD-10-CM

## 2024-06-03 DIAGNOSIS — Z83.438 FAMILY HISTORY OF OTHER DISORDER OF LIPOPROTEIN METABOLISM AND OTHER LIPIDEMIA: ICD-10-CM

## 2024-06-03 DIAGNOSIS — K63.5 POLYP OF COLON: ICD-10-CM

## 2024-06-03 DIAGNOSIS — E78.5 HYPERLIPIDEMIA, UNSPECIFIED: ICD-10-CM

## 2024-06-03 DIAGNOSIS — I10 ESSENTIAL (PRIMARY) HYPERTENSION: ICD-10-CM

## 2024-06-03 DIAGNOSIS — N18.32 CHRONIC KIDNEY DISEASE, STAGE 3B: ICD-10-CM

## 2024-06-03 DIAGNOSIS — I34.0 NONRHEUMATIC MITRAL (VALVE) INSUFFICIENCY: ICD-10-CM

## 2024-06-03 DIAGNOSIS — K29.70 GASTRITIS, UNSPECIFIED, W/OUT BLEEDING: ICD-10-CM

## 2024-06-03 DIAGNOSIS — Z83.3 FAMILY HISTORY OF DIABETES MELLITUS: ICD-10-CM

## 2024-06-03 DIAGNOSIS — R33.9 RETENTION OF URINE, UNSPECIFIED: ICD-10-CM

## 2024-06-03 DIAGNOSIS — N81.89 OTHER FEMALE GENITAL PROLAPSE: ICD-10-CM

## 2024-06-03 DIAGNOSIS — K59.00 CONSTIPATION, UNSPECIFIED: ICD-10-CM

## 2024-06-03 DIAGNOSIS — E11.9 TYPE 2 DIABETES MELLITUS W/OUT COMPLICATIONS: ICD-10-CM

## 2024-06-03 DIAGNOSIS — M62.89 OTHER SPECIFIED DISORDERS OF MUSCLE: ICD-10-CM

## 2024-06-03 PROCEDURE — 99214 OFFICE O/P EST MOD 30 MIN: CPT

## 2024-06-03 RX ORDER — CALCIUM POLYCARBOPHIL 625 MG
TABLET ORAL
Refills: 0 | Status: ACTIVE | COMMUNITY

## 2024-06-03 RX ORDER — BISMUTH SUBSALICYLATE 525 MG/1
TABLET ORAL
Refills: 0 | Status: ACTIVE | COMMUNITY

## 2024-06-03 RX ORDER — FERROUS SULFATE 137(45) MG
TABLET, EXTENDED RELEASE ORAL
Refills: 0 | Status: ACTIVE | COMMUNITY

## 2024-06-03 NOTE — REVIEW OF SYSTEMS
[Fever] : no fever [Chills] : no chills [Feeling Tired] : not feeling tired [Recent Weight Loss (___ Lbs)] : no recent weight loss [Chest Pain] : no chest pain [Palpitations] : no palpitations [SOB on Exertion] : no shortness of breath during exertion [Abdominal Pain] : abdominal pain [Vomiting] : no vomiting [Constipation] : constipation [Diarrhea] : no diarrhea [Heartburn] : no heartburn [Melena (black stool)] : no melena [Bloating (gassiness)] : no bloating

## 2024-06-03 NOTE — HISTORY OF PRESENT ILLNESS
[FreeTextEntry1] : I saw patient Subha Wise in follow-up today.  The patient is an 88-year-old female who has several medical issues which include cardiac dysrhythmia, hypertension, type 2 diabetes and hyperlipidemia.  Subha has been followed over the years for colorectal cancer screening, dyspepsia and diverticulosis.  The patient has a history of bladder frequency and saw urologist.  She was prescribed a medication which apparently worsened her constipation.  There are periods of time when the patient noticed some abdominal discomfort and the need to manually disimpact feces.  The patient states that she does drink enough water during the day and is taking fiber supplements.  Blood in the stool or on the toilet tissue.  The patient has several caffeinated beverages a day, no ethanol and she does not smoke.  There has been no unexplained weight loss.  The patient's medication list was reviewed.

## 2024-06-03 NOTE — ASSESSMENT
[FreeTextEntry1] : The patient is an 88-year-old female with several significant comorbid conditions.  From the gastrointestinal perspective the patient has known reflux which is under control and diverticular disease.  She has a remote history of pancreatitis.  The patient noticed abdominal discomfort with worsening of her constipation likely on the basis of her medication for urinary frequency.  She is doing much better at the present time and states that she has no further abdominal discomfort.  The patient was told to use MiraLAX on alternate days if she feels that her stool is becoming hard.  She will continue a high potency probiotic and fiber supplements.  Since Subha is feeling better, I do not feel that any diagnostic testing is indicated at the present time.

## 2024-06-03 NOTE — PHYSICAL EXAM
[Alert] : alert [No Acute Distress] : no acute distress [No Respiratory Distress] : no respiratory distress [Auscultation Breath Sounds / Voice Sounds] : lungs were clear to auscultation bilaterally [Bowel Sounds] : normal bowel sounds [No Masses] : no abdominal mass palpated [Abdomen Soft] : soft [] : no hepatosplenomegaly [de-identified] : There is a grade 2/6 systolic murmur

## 2024-06-18 ENCOUNTER — APPOINTMENT (OUTPATIENT)
Dept: ULTRASOUND IMAGING | Facility: CLINIC | Age: 89
End: 2024-06-18
Payer: MEDICARE

## 2024-06-18 ENCOUNTER — OUTPATIENT (OUTPATIENT)
Dept: OUTPATIENT SERVICES | Facility: HOSPITAL | Age: 89
LOS: 1 days | End: 2024-06-18
Payer: MEDICARE

## 2024-06-18 DIAGNOSIS — Z00.00 ENCOUNTER FOR GENERAL ADULT MEDICAL EXAMINATION WITHOUT ABNORMAL FINDINGS: ICD-10-CM

## 2024-06-18 PROCEDURE — 76770 US EXAM ABDO BACK WALL COMP: CPT | Mod: 26

## 2024-06-18 PROCEDURE — 76770 US EXAM ABDO BACK WALL COMP: CPT

## 2024-07-16 ENCOUNTER — NON-APPOINTMENT (OUTPATIENT)
Age: 89
End: 2024-07-16

## 2024-07-16 ENCOUNTER — APPOINTMENT (OUTPATIENT)
Dept: CARDIOLOGY | Facility: CLINIC | Age: 89
End: 2024-07-16
Payer: MEDICARE

## 2024-07-16 VITALS
BODY MASS INDEX: 19.39 KG/M2 | RESPIRATION RATE: 16 BRPM | SYSTOLIC BLOOD PRESSURE: 140 MMHG | DIASTOLIC BLOOD PRESSURE: 65 MMHG | HEART RATE: 85 BPM | WEIGHT: 125 LBS | OXYGEN SATURATION: 96 % | HEIGHT: 67.5 IN

## 2024-07-16 DIAGNOSIS — I47.19 OTHER SUPRAVENTRICULAR TACHYCARDIA: ICD-10-CM

## 2024-07-16 DIAGNOSIS — I10 ESSENTIAL (PRIMARY) HYPERTENSION: ICD-10-CM

## 2024-07-16 DIAGNOSIS — Z00.00 ENCOUNTER FOR GENERAL ADULT MEDICAL EXAMINATION W/OUT ABNORMAL FINDINGS: ICD-10-CM

## 2024-07-16 DIAGNOSIS — E78.5 HYPERLIPIDEMIA, UNSPECIFIED: ICD-10-CM

## 2024-07-16 DIAGNOSIS — N32.81 OVERACTIVE BLADDER: ICD-10-CM

## 2024-07-16 PROCEDURE — 99214 OFFICE O/P EST MOD 30 MIN: CPT

## 2024-07-16 PROCEDURE — 93000 ELECTROCARDIOGRAM COMPLETE: CPT

## 2024-07-16 PROCEDURE — G2211 COMPLEX E/M VISIT ADD ON: CPT

## 2024-07-16 RX ORDER — TROSPIUM CHLORIDE 20 MG/1
20 TABLET, FILM COATED ORAL TWICE DAILY
Qty: 180 | Refills: 0 | Status: ACTIVE | COMMUNITY
Start: 2024-07-16

## 2024-10-03 ENCOUNTER — APPOINTMENT (OUTPATIENT)
Dept: CT IMAGING | Facility: CLINIC | Age: 89
End: 2024-10-03
Payer: MEDICARE

## 2024-10-03 ENCOUNTER — OUTPATIENT (OUTPATIENT)
Dept: OUTPATIENT SERVICES | Facility: HOSPITAL | Age: 89
LOS: 1 days | End: 2024-10-03
Payer: MEDICARE

## 2024-10-03 PROCEDURE — 74176 CT ABD & PELVIS W/O CONTRAST: CPT | Mod: 26,MH

## 2024-10-04 ENCOUNTER — DOCTOR'S OFFICE (OUTPATIENT)
Facility: LOCATION | Age: 89
Setting detail: OPHTHALMOLOGY
End: 2024-10-04
Payer: MEDICARE

## 2024-10-04 ENCOUNTER — NON-APPOINTMENT (OUTPATIENT)
Age: 89
End: 2024-10-04

## 2024-10-04 DIAGNOSIS — H02.834: ICD-10-CM

## 2024-10-04 DIAGNOSIS — H35.373: ICD-10-CM

## 2024-10-04 DIAGNOSIS — H02.831: ICD-10-CM

## 2024-10-04 DIAGNOSIS — Z96.1: ICD-10-CM

## 2024-10-04 DIAGNOSIS — E11.9: ICD-10-CM

## 2024-10-04 PROBLEM — H52.7 REFRACTIVE ERROR: Status: ACTIVE | Noted: 2024-10-04

## 2024-10-04 PROCEDURE — 92250 FUNDUS PHOTOGRAPHY W/I&R: CPT | Performed by: OPHTHALMOLOGY

## 2024-10-04 PROCEDURE — 92012 INTRM OPH EXAM EST PATIENT: CPT | Performed by: OPHTHALMOLOGY

## 2024-10-04 ASSESSMENT — REFRACTION_CURRENTRX
OS_CYLINDER: -2.75
OS_ADD: +3.25
OD_OVR_VA: 20/
OD_VPRISM_DIRECTION: PROGS
OD_CYLINDER: -2.00
OS_OVR_VA: 20/
OD_ADD: +3.25
OS_VPRISM_DIRECTION: PROGS
OD_AXIS: 075
OS_AXIS: 105
OS_SPHERE: +1.25
OD_SPHERE: +1.00

## 2024-10-04 ASSESSMENT — LID POSITION - DERMATOCHALASIS
OS_DERMATOCHALASIS: LUL 2+
OD_DERMATOCHALASIS: RUL 2+

## 2024-10-04 ASSESSMENT — KERATOMETRY
OD_K2POWER_DIOPTERS: 44.00
OS_K1POWER_DIOPTERS: 41.75
OD_K1POWER_DIOPTERS: 41.75
OD_AXISANGLE_DEGREES: 172
OS_AXISANGLE_DEGREES: 006
OS_K2POWER_DIOPTERS: 44.00

## 2024-10-04 ASSESSMENT — REFRACTION_MANIFEST
OS_AXIS: 095
OD_SPHERE: +1.25
OS_ADD: +3.25
OD_VA1: 20/50-1+2
OD_AXIS: 080
OD_ADD: +3.25
OD_AXIS: 080
OS_CYLINDER: -3.00
OD_AXIS: 078
OD_VA1: 20/30
OS_VA1: 20/25-2
OS_CYLINDER: -2.50
OS_VA1: 20/25
OS_VA1: 20/25
OD_CYLINDER: -2.25
OD_SPHERE: +1.00
OS_AXIS: 100
OD_CYLINDER: -2.25
OD_VA1: 20/40-1
OS_SPHERE: +1.00
OS_SPHERE: +1.00
OS_CYLINDER: -2.75
OS_SPHERE: +1.25
OD_CYLINDER: -2.25
OD_SPHERE: +1.25
OS_AXIS: 095

## 2024-10-04 ASSESSMENT — REFRACTION_AUTOREFRACTION
OS_SPHERE: +1.25
OD_SPHERE: +1.00
OS_AXIS: 095
OD_CYLINDER: -2.25
OD_AXIS: 075
OS_CYLINDER: -3.00

## 2024-10-04 ASSESSMENT — TONOMETRY
OD_IOP_MMHG: 16
OS_IOP_MMHG: 16

## 2024-10-04 ASSESSMENT — VISUAL ACUITY
OD_BCVA: 20/70+1
OS_BCVA: 20/50-2

## 2024-10-09 ENCOUNTER — RX RENEWAL (OUTPATIENT)
Age: 89
End: 2024-10-09

## 2024-10-09 ENCOUNTER — APPOINTMENT (OUTPATIENT)
Dept: OBGYN | Facility: CLINIC | Age: 89
End: 2024-10-09
Payer: MEDICARE

## 2024-10-09 ENCOUNTER — LABORATORY RESULT (OUTPATIENT)
Age: 89
End: 2024-10-09

## 2024-10-09 ENCOUNTER — APPOINTMENT (OUTPATIENT)
Dept: GASTROENTEROLOGY | Facility: CLINIC | Age: 89
End: 2024-10-09
Payer: MEDICARE

## 2024-10-09 VITALS
SYSTOLIC BLOOD PRESSURE: 134 MMHG | DIASTOLIC BLOOD PRESSURE: 65 MMHG | BODY MASS INDEX: 18.77 KG/M2 | WEIGHT: 121 LBS | HEIGHT: 67.5 IN

## 2024-10-09 VITALS
TEMPERATURE: 97.3 F | SYSTOLIC BLOOD PRESSURE: 134 MMHG | HEIGHT: 67 IN | DIASTOLIC BLOOD PRESSURE: 65 MMHG | WEIGHT: 122 LBS | BODY MASS INDEX: 19.15 KG/M2

## 2024-10-09 DIAGNOSIS — Z01.419 ENCOUNTER FOR GYNECOLOGICAL EXAMINATION (GENERAL) (ROUTINE) W/OUT ABNORMAL FINDINGS: ICD-10-CM

## 2024-10-09 DIAGNOSIS — K59.00 CONSTIPATION, UNSPECIFIED: ICD-10-CM

## 2024-10-09 DIAGNOSIS — E11.9 TYPE 2 DIABETES MELLITUS W/OUT COMPLICATIONS: ICD-10-CM

## 2024-10-09 DIAGNOSIS — E78.5 HYPERLIPIDEMIA, UNSPECIFIED: ICD-10-CM

## 2024-10-09 DIAGNOSIS — K29.70 GASTRITIS, UNSPECIFIED, W/OUT BLEEDING: ICD-10-CM

## 2024-10-09 DIAGNOSIS — R10.9 UNSPECIFIED ABDOMINAL PAIN: ICD-10-CM

## 2024-10-09 DIAGNOSIS — I34.1 NONRHEUMATIC MITRAL (VALVE) PROLAPSE: ICD-10-CM

## 2024-10-09 DIAGNOSIS — I10 ESSENTIAL (PRIMARY) HYPERTENSION: ICD-10-CM

## 2024-10-09 DIAGNOSIS — K57.90 DIVERTICULOSIS OF INTESTINE, PART UNSPECIFIED, W/OUT PERFORATION OR ABSCESS W/OUT BLEEDING: ICD-10-CM

## 2024-10-09 DIAGNOSIS — K21.9 GASTRO-ESOPHAGEAL REFLUX DISEASE W/OUT ESOPHAGITIS: ICD-10-CM

## 2024-10-09 DIAGNOSIS — N17.9 ACUTE KIDNEY FAILURE, UNSPECIFIED: ICD-10-CM

## 2024-10-09 PROCEDURE — 99214 OFFICE O/P EST MOD 30 MIN: CPT

## 2024-10-09 PROCEDURE — G0101: CPT

## 2024-10-09 RX ORDER — FAMOTIDINE 40 MG/1
40 TABLET, FILM COATED ORAL
Qty: 90 | Refills: 0 | Status: ACTIVE | COMMUNITY
Start: 2024-10-09 | End: 1900-01-01

## 2024-10-09 RX ORDER — CHLORDIAZEPOXIDE HYDROCHLORIDE AND CLIDINIUM BROMIDE 5; 2.5 MG/1; MG/1
5-2.5 CAPSULE, GELATIN COATED ORAL 3 TIMES DAILY
Qty: 90 | Refills: 5 | Status: ACTIVE | COMMUNITY
Start: 2024-10-09 | End: 1900-01-01

## 2024-10-11 RX ORDER — DICYCLOMINE HYDROCHLORIDE 10 MG/1
10 CAPSULE ORAL 3 TIMES DAILY
Qty: 90 | Refills: 3 | Status: ACTIVE | COMMUNITY
Start: 2024-10-10 | End: 1900-01-01

## 2024-10-14 ENCOUNTER — NON-APPOINTMENT (OUTPATIENT)
Age: 89
End: 2024-10-14

## 2024-10-15 LAB
CYTOLOGY CVX/VAG DOC THIN PREP: NORMAL
HPV HIGH+LOW RISK DNA PNL CVX: DETECTED

## 2024-11-04 ENCOUNTER — APPOINTMENT (OUTPATIENT)
Dept: GASTROENTEROLOGY | Facility: CLINIC | Age: 89
End: 2024-11-04
Payer: MEDICARE

## 2024-11-04 VITALS
DIASTOLIC BLOOD PRESSURE: 60 MMHG | HEIGHT: 67 IN | BODY MASS INDEX: 18.68 KG/M2 | OXYGEN SATURATION: 98 % | WEIGHT: 119 LBS | SYSTOLIC BLOOD PRESSURE: 120 MMHG | HEART RATE: 95 BPM | TEMPERATURE: 97.1 F

## 2024-11-04 DIAGNOSIS — K29.70 GASTRITIS, UNSPECIFIED, W/OUT BLEEDING: ICD-10-CM

## 2024-11-04 DIAGNOSIS — I47.19 OTHER SUPRAVENTRICULAR TACHYCARDIA: ICD-10-CM

## 2024-11-04 DIAGNOSIS — R63.0 ANOREXIA: ICD-10-CM

## 2024-11-04 DIAGNOSIS — Z87.19 PERSONAL HISTORY OF OTHER DISEASES OF THE DIGESTIVE SYSTEM: ICD-10-CM

## 2024-11-04 DIAGNOSIS — I10 ESSENTIAL (PRIMARY) HYPERTENSION: ICD-10-CM

## 2024-11-04 DIAGNOSIS — K57.90 DIVERTICULOSIS OF INTESTINE, PART UNSPECIFIED, W/OUT PERFORATION OR ABSCESS W/OUT BLEEDING: ICD-10-CM

## 2024-11-04 DIAGNOSIS — E78.5 HYPERLIPIDEMIA, UNSPECIFIED: ICD-10-CM

## 2024-11-04 DIAGNOSIS — I34.0 NONRHEUMATIC MITRAL (VALVE) INSUFFICIENCY: ICD-10-CM

## 2024-11-04 DIAGNOSIS — Z86.39 PERSONAL HISTORY OF OTHER ENDOCRINE, NUTRITIONAL AND METABOLIC DISEASE: ICD-10-CM

## 2024-11-04 DIAGNOSIS — K21.9 GASTRO-ESOPHAGEAL REFLUX DISEASE W/OUT ESOPHAGITIS: ICD-10-CM

## 2024-11-04 DIAGNOSIS — I34.1 NONRHEUMATIC MITRAL (VALVE) PROLAPSE: ICD-10-CM

## 2024-11-04 DIAGNOSIS — R10.9 UNSPECIFIED ABDOMINAL PAIN: ICD-10-CM

## 2024-11-04 PROCEDURE — 99214 OFFICE O/P EST MOD 30 MIN: CPT

## 2024-11-08 ENCOUNTER — RX RENEWAL (OUTPATIENT)
Age: 89
End: 2024-11-08

## 2024-11-20 PROCEDURE — 74176 CT ABD & PELVIS W/O CONTRAST: CPT

## 2025-01-23 ENCOUNTER — RESULT REVIEW (OUTPATIENT)
Age: 89
End: 2025-01-23

## 2025-01-23 ENCOUNTER — OUTPATIENT (OUTPATIENT)
Dept: OUTPATIENT SERVICES | Facility: HOSPITAL | Age: 89
LOS: 1 days | End: 2025-01-23
Payer: MEDICARE

## 2025-01-23 ENCOUNTER — APPOINTMENT (OUTPATIENT)
Dept: MAMMOGRAPHY | Facility: CLINIC | Age: 89
End: 2025-01-23
Payer: MEDICARE

## 2025-01-23 DIAGNOSIS — Z01.419 ENCOUNTER FOR GYNECOLOGICAL EXAMINATION (GENERAL) (ROUTINE) WITHOUT ABNORMAL FINDINGS: ICD-10-CM

## 2025-01-23 PROCEDURE — 77063 BREAST TOMOSYNTHESIS BI: CPT

## 2025-01-23 PROCEDURE — 77067 SCR MAMMO BI INCL CAD: CPT

## 2025-01-23 PROCEDURE — 77063 BREAST TOMOSYNTHESIS BI: CPT | Mod: 26

## 2025-01-23 PROCEDURE — 77067 SCR MAMMO BI INCL CAD: CPT | Mod: 26

## 2025-03-06 ENCOUNTER — RX RENEWAL (OUTPATIENT)
Age: 89
End: 2025-03-06

## 2025-03-28 LAB
25(OH)D3 SERPL-MCNC: 53.2 NG/ML
ALBUMIN SERPL ELPH-MCNC: 4.3 G/DL
ALP BLD-CCNC: 66 U/L
ALT SERPL-CCNC: 36 U/L
ANION GAP SERPL CALC-SCNC: 14 MMOL/L
APPEARANCE: ABNORMAL
AST SERPL-CCNC: 52 U/L
BACTERIA: ABNORMAL /HPF
BASOPHILS # BLD AUTO: 0.04 K/UL
BASOPHILS NFR BLD AUTO: 0.6 %
BILIRUB SERPL-MCNC: 0.4 MG/DL
BILIRUBIN URINE: NEGATIVE
BLOOD URINE: NEGATIVE
BUN SERPL-MCNC: 19 MG/DL
CALCIUM SERPL-MCNC: 9.4 MG/DL
CAST: 1 /LPF
CHLORIDE SERPL-SCNC: 104 MMOL/L
CHOLEST SERPL-MCNC: 155 MG/DL
CO2 SERPL-SCNC: 25 MMOL/L
COLOR: YELLOW
CREAT SERPL-MCNC: 1.38 MG/DL
CREAT SPEC-SCNC: 80 MG/DL
CREAT/PROT UR: 0.4 RATIO
EGFRCR SERPLBLD CKD-EPI 2021: 37 ML/MIN/1.73M2
EOSINOPHIL # BLD AUTO: 0.27 K/UL
EOSINOPHIL NFR BLD AUTO: 4.4 %
EPITHELIAL CELLS: 15 /HPF
ESTIMATED AVERAGE GLUCOSE: 160 MG/DL
FOLATE SERPL-MCNC: >20 NG/ML
GLUCOSE QUALITATIVE U: NEGATIVE MG/DL
GLUCOSE SERPL-MCNC: 104 MG/DL
HBA1C MFR BLD HPLC: 7.2 %
HCT VFR BLD CALC: 35.9 %
HDLC SERPL-MCNC: 58 MG/DL
HGB BLD-MCNC: 11.9 G/DL
IMM GRANULOCYTES NFR BLD AUTO: 0.3 %
KETONES URINE: NEGATIVE MG/DL
LDLC SERPL-MCNC: 76 MG/DL
LEUKOCYTE ESTERASE URINE: ABNORMAL
LYMPHOCYTES # BLD AUTO: 1.96 K/UL
LYMPHOCYTES NFR BLD AUTO: 31.7 %
MAN DIFF?: NORMAL
MCHC RBC-ENTMCNC: 30.7 PG
MCHC RBC-ENTMCNC: 33.1 G/DL
MCV RBC AUTO: 92.8 FL
MICROSCOPIC-UA: NORMAL
MONOCYTES # BLD AUTO: 0.48 K/UL
MONOCYTES NFR BLD AUTO: 7.8 %
NEUTROPHILS # BLD AUTO: 3.42 K/UL
NEUTROPHILS NFR BLD AUTO: 55.2 %
NITRITE URINE: NEGATIVE
NONHDLC SERPL-MCNC: 97 MG/DL
PH URINE: 6
PLATELET # BLD AUTO: 199 K/UL
POTASSIUM SERPL-SCNC: 4.4 MMOL/L
PROT SERPL-MCNC: 7.3 G/DL
PROT UR-MCNC: 31 MG/DL
PROTEIN URINE: 30 MG/DL
RBC # BLD: 3.87 M/UL
RBC # FLD: 13.8 %
RED BLOOD CELLS URINE: 1 /HPF
SODIUM SERPL-SCNC: 143 MMOL/L
SPECIFIC GRAVITY URINE: 1.01
T4 FREE SERPL-MCNC: 1.5 NG/DL
TRIGL SERPL-MCNC: 120 MG/DL
TSH SERPL-ACNC: 1.49 UIU/ML
UROBILINOGEN URINE: 0.2 MG/DL
VIT B12 SERPL-MCNC: >2000 PG/ML
WBC # FLD AUTO: 6.19 K/UL
WHITE BLOOD CELLS URINE: 6 /HPF

## 2025-04-04 ENCOUNTER — APPOINTMENT (OUTPATIENT)
Dept: CARDIOLOGY | Facility: CLINIC | Age: 89
End: 2025-04-04
Payer: MEDICARE

## 2025-04-04 VITALS — WEIGHT: 122 LBS | OXYGEN SATURATION: 98 % | HEART RATE: 68 BPM | BODY MASS INDEX: 19.11 KG/M2

## 2025-04-04 DIAGNOSIS — Z00.00 ENCOUNTER FOR GENERAL ADULT MEDICAL EXAMINATION W/OUT ABNORMAL FINDINGS: ICD-10-CM

## 2025-04-04 DIAGNOSIS — I10 ESSENTIAL (PRIMARY) HYPERTENSION: ICD-10-CM

## 2025-04-04 PROCEDURE — G2211 COMPLEX E/M VISIT ADD ON: CPT

## 2025-04-04 PROCEDURE — 99213 OFFICE O/P EST LOW 20 MIN: CPT

## 2025-04-08 LAB — HIV1+2 AB SPEC QL IA.RAPID: NONREACTIVE

## 2025-04-10 LAB
M TB IFN-G BLD-IMP: NEGATIVE
QUANTIFERON TB PLUS MITOGEN MINUS NIL: >10 IU/ML
QUANTIFERON TB PLUS NIL: 0.04 IU/ML
QUANTIFERON TB PLUS TB1 MINUS NIL: 0.01 IU/ML
QUANTIFERON TB PLUS TB2 MINUS NIL: 0.01 IU/ML

## 2025-05-06 ENCOUNTER — DOCTOR'S OFFICE (OUTPATIENT)
Facility: LOCATION | Age: OVER 89
Setting detail: OPHTHALMOLOGY
End: 2025-05-06
Payer: MEDICARE

## 2025-05-06 DIAGNOSIS — H35.3212: ICD-10-CM

## 2025-05-06 DIAGNOSIS — H02.834: ICD-10-CM

## 2025-05-06 DIAGNOSIS — H02.831: ICD-10-CM

## 2025-05-06 DIAGNOSIS — H35.373: ICD-10-CM

## 2025-05-06 DIAGNOSIS — H26.491: ICD-10-CM

## 2025-05-06 DIAGNOSIS — Z96.1: ICD-10-CM

## 2025-05-06 DIAGNOSIS — E11.9: ICD-10-CM

## 2025-05-06 PROCEDURE — 92134 CPTRZ OPH DX IMG PST SGM RTA: CPT | Performed by: OPHTHALMOLOGY

## 2025-05-06 PROCEDURE — 92014 COMPRE OPH EXAM EST PT 1/>: CPT | Performed by: OPHTHALMOLOGY

## 2025-05-06 ASSESSMENT — REFRACTION_MANIFEST
OS_VA1: 20/25
OS_AXIS: 095
OD_ADD: +3.25
OS_SPHERE: +1.00
OD_VA1: 20/30
OD_AXIS: 080
OS_SPHERE: +1.00
OD_VA1: 20/30 -2
OD_AXIS: 080
OS_ADD: +3.25
OD_AXIS: 078
OS_SPHERE: +1.25
OD_CYLINDER: -1.00
OD_CYLINDER: -2.25
OS_VA1: 20/25
OS_VA1: 20/20 -1
OD_CYLINDER: -2.25
OS_AXIS: 095
OD_SPHERE: +1.25
OS_CYLINDER: -1.25
OD_SPHERE: +1.25
OD_VA1: 20/50-1+2
OS_VA1: 20/25-2
OS_AXIS: 095
OS_SPHERE: +1.00
OS_CYLINDER: -2.75
OS_AXIS: 100
OD_SPHERE: +1.25
OU_VA: 20/25
OD_AXIS: 080
OD_VA1: 20/40-1
OS_CYLINDER: -3.00
OD_CYLINDER: -2.25
OS_CYLINDER: -2.50
OD_SPHERE: +1.00

## 2025-05-06 ASSESSMENT — REFRACTION_CURRENTRX
OD_CYLINDER: -2.25
OD_AXIS: 082
OD_VPRISM_DIRECTION: PROGS
OD_OVR_VA: 20/
OS_VPRISM_DIRECTION: PROGS
OS_CYLINDER: -2.75
OD_OVR_VA: 20/
OD_AXIS: 075
OS_OVR_VA: 20/
OS_VPRISM_DIRECTION: PROGS
OD_ADD: +3.25
OD_VPRISM_DIRECTION: PROGS
OS_AXIS: 100
OS_ADD: +3.25
OS_CYLINDER: -2.25
OD_ADD: +3.25
OD_CYLINDER: -2.00
OS_SPHERE: +1.25
OS_SPHERE: +1.00
OD_SPHERE: +1.00
OS_ADD: +3.25
OS_AXIS: 105
OS_OVR_VA: 20/
OD_SPHERE: +1.25

## 2025-05-06 ASSESSMENT — TONOMETRY
OD_IOP_MMHG: 14
OS_IOP_MMHG: 15

## 2025-05-06 ASSESSMENT — REFRACTION_AUTOREFRACTION
OD_AXIS: 082
OS_SPHERE: +1.25
OS_AXIS: 096
OS_CYLINDER: -2.75
OD_SPHERE: +1.25
OD_CYLINDER: -2.25

## 2025-05-06 ASSESSMENT — KERATOMETRY
OD_K2POWER_DIOPTERS: 44.00
OD_AXISANGLE_DEGREES: 172
OD_K1POWER_DIOPTERS: 41.75
OS_AXISANGLE_DEGREES: 007
OS_K2POWER_DIOPTERS: 44.00
OS_K1POWER_DIOPTERS: 41.25

## 2025-05-06 ASSESSMENT — CONFRONTATIONAL VISUAL FIELD TEST (CVF)
OS_FINDINGS: FULL
OD_FINDINGS: FULL

## 2025-05-06 ASSESSMENT — LID POSITION - DERMATOCHALASIS
OD_DERMATOCHALASIS: RUL 2+
OS_DERMATOCHALASIS: LUL 2+

## 2025-05-06 ASSESSMENT — VISUAL ACUITY
OS_BCVA: 20/40 -1
OD_BCVA: 20/50

## 2025-05-13 ENCOUNTER — NON-APPOINTMENT (OUTPATIENT)
Age: 89
End: 2025-05-13

## 2025-05-15 ENCOUNTER — APPOINTMENT (OUTPATIENT)
Dept: CARDIOLOGY | Facility: CLINIC | Age: 89
End: 2025-05-15
Payer: MEDICARE

## 2025-05-15 VITALS
WEIGHT: 120 LBS | OXYGEN SATURATION: 95 % | DIASTOLIC BLOOD PRESSURE: 60 MMHG | BODY MASS INDEX: 18.79 KG/M2 | SYSTOLIC BLOOD PRESSURE: 142 MMHG | HEART RATE: 60 BPM

## 2025-05-15 DIAGNOSIS — I10 ESSENTIAL (PRIMARY) HYPERTENSION: ICD-10-CM

## 2025-05-15 DIAGNOSIS — R26.81 UNSTEADINESS ON FEET: ICD-10-CM

## 2025-05-15 PROCEDURE — 99213 OFFICE O/P EST LOW 20 MIN: CPT

## 2025-05-15 PROCEDURE — G2211 COMPLEX E/M VISIT ADD ON: CPT

## 2025-05-31 ENCOUNTER — NON-APPOINTMENT (OUTPATIENT)
Age: 89
End: 2025-05-31

## 2025-06-17 ENCOUNTER — RX RENEWAL (OUTPATIENT)
Age: 89
End: 2025-06-17

## 2025-08-25 ENCOUNTER — DOCTOR'S OFFICE (OUTPATIENT)
Facility: LOCATION | Age: OVER 89
Setting detail: OPHTHALMOLOGY
End: 2025-08-25
Payer: MEDICARE

## 2025-08-25 DIAGNOSIS — H52.4: ICD-10-CM

## 2025-08-25 DIAGNOSIS — H52.7: ICD-10-CM

## 2025-08-25 PROCEDURE — 92015 DETERMINE REFRACTIVE STATE: CPT | Performed by: OPHTHALMOLOGY

## 2025-08-25 ASSESSMENT — REFRACTION_AUTOREFRACTION
OD_AXIS: 079
OS_CYLINDER: -2.50
OD_SPHERE: +1.00
OS_AXIS: 095
OD_CYLINDER: -2.25
OS_SPHERE: +1.25

## 2025-08-25 ASSESSMENT — REFRACTION_CURRENTRX
OD_OVR_VA: 20/
OS_CYLINDER: -2.75
OS_CYLINDER: -2.75
OD_VPRISM_DIRECTION: PROGS
OD_ADD: +3.25
OD_ADD: +3.25
OS_ADD: +3.25
OD_SPHERE: +1.75
OS_VPRISM_DIRECTION: PROGS
OS_VPRISM_DIRECTION: PROGS
OS_SPHERE: +1.00
OS_VPRISM_DIRECTION: PROGS
OD_OVR_VA: 20/
OS_ADD: +3.25
OS_SPHERE: +1.00
OD_ADD: +3.25
OD_VPRISM_DIRECTION: PROGS
OD_CYLINDER: -3.00
OD_OVR_VA: 20/
OD_CYLINDER: -2.25
OD_AXIS: 076
OD_SPHERE: +1.25
OS_AXIS: 100
OS_OVR_VA: 20/
OD_AXIS: 075
OD_AXIS: 082
OS_CYLINDER: -2.25
OD_SPHERE: +1.00
OD_VPRISM_DIRECTION: PROGS
OS_ADD: +3.25
OS_AXIS: 099
OS_SPHERE: +1.25
OS_AXIS: 105
OS_OVR_VA: 20/
OD_CYLINDER: -2.00
OS_OVR_VA: 20/

## 2025-08-25 ASSESSMENT — REFRACTION_MANIFEST
OS_VA1: 20/25
OD_CYLINDER: -2.25
OS_SPHERE: +1.00
OD_AXIS: 078
OD_CYLINDER: -2.25
OS_SPHERE: +1.25
OD_VA1: 20/30 -2
OD_CYLINDER: -2.00
OD_VA1: 20/30-2+1
OU_VA: 20/25
OS_CYLINDER: -2.50
OS_AXIS: 095
OD_AXIS: 080
OD_ADD: +3.25
OS_AXIS: 095
OS_VA1: 20/25-2
OS_SPHERE: +1.50
OD_VA1: 20/50-1+2
OS_AXIS: 095
OD_AXIS: 080
OS_SPHERE: +1.00
OS_ADD: +3.00
OS_CYLINDER: -2.50
OS_AXIS: 095
OD_ADD: +3.00
OD_CYLINDER: -2.50
OS_ADD: +3.25
OD_VA1: 20/30
OS_CYLINDER: -2.25
OD_AXIS: 080
OS_CYLINDER: -3.00
OD_VA1: 20/40-1
OS_SPHERE: +1.25
OD_CYLINDER: -1.00
OS_AXIS: 100
OS_CYLINDER: -1.25
OD_SPHERE: +1.00
OS_SPHERE: +1.00
OS_VA1: 20/25
OD_AXIS: 079
OD_CYLINDER: -2.25
OD_SPHERE: +1.25
OS_CYLINDER: -2.75
OS_VA1: 20/20 -1
OD_SPHERE: +1.00
OS_AXIS: 095
OD_AXIS: 080
OD_SPHERE: +1.25
OS_VA1: 20/20-3

## 2025-08-25 ASSESSMENT — LID POSITION - DERMATOCHALASIS
OS_DERMATOCHALASIS: LUL 2+
OD_DERMATOCHALASIS: RUL 2+

## 2025-08-25 ASSESSMENT — VISUAL ACUITY
OS_BCVA: 20/50+1
OD_BCVA: 20/20-3

## 2025-08-25 ASSESSMENT — KERATOMETRY
OS_AXISANGLE_DEGREES: 007
OD_K1POWER_DIOPTERS: 41.75
OD_AXISANGLE_DEGREES: 172
OS_K1POWER_DIOPTERS: 41.25
OD_K2POWER_DIOPTERS: 44.00
OS_K2POWER_DIOPTERS: 44.00